# Patient Record
Sex: FEMALE | Race: BLACK OR AFRICAN AMERICAN | NOT HISPANIC OR LATINO | Employment: UNEMPLOYED | URBAN - METROPOLITAN AREA
[De-identification: names, ages, dates, MRNs, and addresses within clinical notes are randomized per-mention and may not be internally consistent; named-entity substitution may affect disease eponyms.]

---

## 2017-01-03 ENCOUNTER — TELEPHONE (OUTPATIENT)
Dept: PODIATRY | Facility: CLINIC | Age: 37
End: 2017-01-03

## 2017-01-03 RX ORDER — CLINDAMYCIN HYDROCHLORIDE 300 MG/1
300 CAPSULE ORAL 3 TIMES DAILY
Qty: 30 CAPSULE | Refills: 0 | Status: SHIPPED | OUTPATIENT
Start: 2017-01-03 | End: 2017-01-13

## 2017-01-03 NOTE — TELEPHONE ENCOUNTER
Attempted to call, however left voicemail. Patient needs to stop taking Bactrim DS and begin taking clindamycin. Her wound culture grew resistant Staph aureus.

## 2017-01-12 ENCOUNTER — OFFICE VISIT (OUTPATIENT)
Dept: PODIATRY | Facility: CLINIC | Age: 37
End: 2017-01-12
Payer: MEDICAID

## 2017-01-12 VITALS
DIASTOLIC BLOOD PRESSURE: 69 MMHG | HEART RATE: 88 BPM | BODY MASS INDEX: 28.89 KG/M2 | HEIGHT: 61 IN | WEIGHT: 153 LBS | SYSTOLIC BLOOD PRESSURE: 110 MMHG

## 2017-01-12 DIAGNOSIS — T81.40XD POSTOPERATIVE INFECTION, SUBSEQUENT ENCOUNTER: Primary | ICD-10-CM

## 2017-01-12 PROCEDURE — 99999 PR PBB SHADOW E&M-EST. PATIENT-LVL III: CPT | Mod: PBBFAC,,, | Performed by: PODIATRIST

## 2017-01-12 PROCEDURE — 99024 POSTOP FOLLOW-UP VISIT: CPT | Mod: ,,, | Performed by: PODIATRIST

## 2017-01-12 PROCEDURE — 99213 OFFICE O/P EST LOW 20 MIN: CPT | Mod: PBBFAC,PO | Performed by: PODIATRIST

## 2017-01-12 NOTE — MR AVS SNAPSHOT
[facility]   [place]  [place] [address]  Phone: [phone]                  Umer Rodriguez   2017 10:00 AM   Office Visit    Description:  Female : 1980   Provider:  Dirk Cummings DPM   Department:  [facility]           Diagnoses this Visit        Comments    Postoperative infection, subsequent encounter    -  Primary            To Do List           Future Appointments        Provider Department Dept Phone    2017 8:00 AM RENUKA XR1 300 LB LIMIT [facility] [phone]    2017 1:30 PM Dirk Cummings DPM [facility] [phone]      Goals (5 Years of Data)     None      Follow-Up and Disposition     Return in about 2 weeks (around 2017).      [facility]     [facility] - / Assistance  Registered nurses in the [facility] Center provide clinical advisement, health education, appointment booking, and other advisory services.  Call for this free service at [phone].             Medications           Message regarding Medications     Verify the changes and/or additions to your medication regime listed below are the same as discussed with your clinician today.  If any of these changes or additions are incorrect, please notify your healthcare provider.        STOP taking these medications     oxycodone-acetaminophen (PERCOCET) 5-325 mg per tablet Take 2 tablets by mouth every 4 (four) hours as needed for Pain.           Verify that the below list of medications is an accurate representation of the medications you are currently taking.  If none reported, the list may be blank. If incorrect, please contact your healthcare provider. Carry this list with you in case of emergency.           Current Medications     clindamycin (CLEOCIN) 300 MG capsule Take 1 capsule (300 mg total) by mouth 3 (three) times daily.    ibuprofen (ADVIL,MOTRIN) 800 MG tablet Take 1 tablet (800 mg total) by mouth  "3 (three) times daily.    L norgest&E estradiol-E estrad (SEASONIQUE) 0.15 mg-30 mcg (84)/10 mcg (7) 3MPk Take 1 tablet by mouth once daily.           Clinical Reference Information           Vital Signs - Last Recorded  Most recent update: 1/12/2017 10:36 AM by Annalee Crane MA    BP Pulse Ht Wt LMP BMI    110/69 88 5' 1" (1.549 m) 69.4 kg (153 lb) 10/29/2016 28.91 kg/m2      Blood Pressure          Most Recent Value    BP  110/69      Allergies as of 1/12/2017     Bactrim [Sulfamethoxazole-trimethoprim]    Percocet [Oxycodone-acetaminophen]      Immunizations Administered on Date of Encounter - 1/12/2017     None      Orders Placed During Today's Visit     Future Labs/Procedures Expected by Expires    X-Ray Foot Complete Right  1/26/2017 1/12/2018      Instructions    Sundar splint right third and fourth toes.       "

## 2017-01-14 NOTE — PROGRESS NOTES
"Subjective:      Patient ID: Umer Rodriguez is a 36 y.o. female.    Chief Complaint: No chief complaint on file.    Post revision arthrodesis with autograft from the calcaneus of fourth toe and fifth toe arthroplasty on 16.. Says she developed an allergic reaction to Bactrim DS. Painting betadine to incision sites as recommended. Notes toes are stilling looking more normal with resolution of swelling in toes.    Vitals:    17 1035   BP: 110/69   Pulse: 88   Weight: 69.4 kg (153 lb)   Height: 5' 1" (1.549 m)   PainSc:   4   PainLoc: Foot      Past Medical History   Diagnosis Date    Allergy     Fibroids     ARNOL (iron deficiency anemia) 2014       Past Surgical History   Procedure Laterality Date     section  ,     Foot surgery Right        Family History   Problem Relation Age of Onset    Hypertension Mother     Breast cancer Neg Hx     Colon cancer Neg Hx     Ovarian cancer Neg Hx     Hyperlipidemia Neg Hx     Heart disease Neg Hx     Diabetes Neg Hx     Cancer Neg Hx     Arthritis Neg Hx        Social History     Social History    Marital status: Single     Spouse name: N/A    Number of children: N/A    Years of education: N/A     Social History Main Topics    Smoking status: Never Smoker    Smokeless tobacco: Never Used    Alcohol use No    Drug use: No    Sexual activity: Yes     Partners: Male     Birth control/ protection: None     Other Topics Concern    None     Social History Narrative       Current Outpatient Prescriptions   Medication Sig Dispense Refill    clindamycin (CLEOCIN) 300 MG capsule Take 1 capsule (300 mg total) by mouth 3 (three) times daily. 30 capsule 0    ibuprofen (ADVIL,MOTRIN) 800 MG tablet Take 1 tablet (800 mg total) by mouth 3 (three) times daily. 30 tablet 0    L norgest&E estradiol-E estrad (SEASONIQUE) 0.15 mg-30 mcg (84)/10 mcg (7) 3MPk Take 1 tablet by mouth once daily. 90 each 6     No current facility-administered " medications for this visit.        Review of patient's allergies indicates:  No Known Allergies      Review of Systems   Constitution: Negative for chills, fever, weakness and malaise/fatigue.   Cardiovascular: Negative for chest pain, claudication and leg swelling.   Respiratory: Negative for cough and shortness of breath.    Skin: Positive for rash. Negative for itching.   Musculoskeletal: Negative for back pain, joint pain, muscle cramps and muscle weakness.   Gastrointestinal: Negative for nausea and vomiting.   Neurological: Negative for numbness and paresthesias.   Psychiatric/Behavioral: Negative for altered mental status.           Objective:      Physical Exam   Constitutional: She is oriented to person, place, and time. She appears well-developed and well-nourished. No distress.   Cardiovascular: Intact distal pulses.    Pulses:       Dorsalis pedis pulses are 2+ on the right side, and 2+ on the left side.        Posterior tibial pulses are 2+ on the right side, and 2+ on the left side.   Musculoskeletal:        Feet:    Rectus alignment toes 1-4 right foot. No available motion hallux IPJ and toes 2,3 secondary to arthrodesis. Right fourth toe is iatrogenically shortened and flexible.     Left foot medial longitudinal arch is moderately depressed with foot loading compared to left. Mild gastrocnemius equinus bilateral foot. Flexible pes planus bilateral foot.    Pain plantar right heel. No pain with medial and lateral compression of heel. No pain with MMT.    Neurological: She is alert and oriented to person, place, and time. She has normal strength. No sensory deficit.   Skin: Skin is warm and dry. No ecchymosis and no rash noted. She is not diaphoretic. No cyanosis or erythema. No pallor. Nails show no clubbing.   Superficial dehiscence of right hallux incision site with granular base and mild surrounding skin slough/hyperkeratosis, no erythema, scant serous drainage, no odor.    Crusting overlying  incision site fourth and fifth toes. small pin whole sized wound dorsal lateral right fifth toe wound site, no drainage, no erythema, no odor.    Incision lateral right heel healed.               Assessment:       Encounter Diagnosis   Name Primary?    Postoperative infection, subsequent encounter Yes         Plan:       Diagnoses and all orders for this visit:    Postoperative infection, subsequent encounter  -     X-Ray Foot Complete Right; Future      I counseled the patient on her conditions, their implications and medical management.    Continue betadine to hallux and fifth toe incision sites.    Continue protected weightbearing with boot. Discussed placing weight towards midfoot and altering gait.     RTC 2 weeks or prn.

## 2017-01-18 ENCOUNTER — PATIENT MESSAGE (OUTPATIENT)
Dept: PODIATRY | Facility: CLINIC | Age: 37
End: 2017-01-18

## 2017-01-26 ENCOUNTER — OFFICE VISIT (OUTPATIENT)
Dept: PODIATRY | Facility: CLINIC | Age: 37
End: 2017-01-26
Payer: MEDICAID

## 2017-01-26 ENCOUNTER — HOSPITAL ENCOUNTER (OUTPATIENT)
Dept: RADIOLOGY | Facility: HOSPITAL | Age: 37
Discharge: HOME | End: 2017-01-26
Attending: PODIATRIST
Payer: MEDICAID

## 2017-01-26 VITALS
SYSTOLIC BLOOD PRESSURE: 103 MMHG | HEIGHT: 61 IN | DIASTOLIC BLOOD PRESSURE: 68 MMHG | HEART RATE: 88 BPM | WEIGHT: 153 LBS | BODY MASS INDEX: 28.89 KG/M2

## 2017-01-26 DIAGNOSIS — M84.374A STRESS FRACTURE OF CALCANEUS, RIGHT, INITIAL ENCOUNTER: Primary | ICD-10-CM

## 2017-01-26 DIAGNOSIS — Z98.890 POSTOPERATIVE STATE: ICD-10-CM

## 2017-01-26 DIAGNOSIS — T81.40XD POSTOPERATIVE INFECTION, SUBSEQUENT ENCOUNTER: ICD-10-CM

## 2017-01-26 PROCEDURE — 99999 PR PBB SHADOW E&M-EST. PATIENT-LVL III: CPT | Mod: PBBFAC,,, | Performed by: PODIATRIST

## 2017-01-26 PROCEDURE — 99024 POSTOP FOLLOW-UP VISIT: CPT | Mod: ,,, | Performed by: PODIATRIST

## 2017-01-26 PROCEDURE — 99213 OFFICE O/P EST LOW 20 MIN: CPT | Mod: PBBFAC,PO | Performed by: PODIATRIST

## 2017-01-26 PROCEDURE — 73630 X-RAY EXAM OF FOOT: CPT | Mod: 26,RT,, | Performed by: RADIOLOGY

## 2017-01-26 NOTE — PROGRESS NOTES
"Subjective:      Patient ID: Umer Rodriguez is a 36 y.o. female.    Chief Complaint: Post-op Evaluation    Post revision arthrodesis with autograft from the calcaneus of fourth toe and fifth toe arthroplasty on 16. Relates a week ago she developed severe pain in right heel. Pain resolved with rest and limited walking. No pain today. Ambulating with orthopedic boot.    Vitals:    17 1345   BP: 103/68   Pulse: 88   Weight: 69.4 kg (153 lb)   Height: 5' 1" (1.549 m)   PainSc: 0-No pain      Past Medical History   Diagnosis Date    Allergy     Fibroids     ARNOL (iron deficiency anemia) 2014       Past Surgical History   Procedure Laterality Date     section  ,     Foot surgery Right        Family History   Problem Relation Age of Onset    Hypertension Mother     Breast cancer Neg Hx     Colon cancer Neg Hx     Ovarian cancer Neg Hx     Hyperlipidemia Neg Hx     Heart disease Neg Hx     Diabetes Neg Hx     Cancer Neg Hx     Arthritis Neg Hx        Social History     Social History    Marital status: Single     Spouse name: N/A    Number of children: N/A    Years of education: N/A     Social History Main Topics    Smoking status: Never Smoker    Smokeless tobacco: Never Used    Alcohol use No    Drug use: No    Sexual activity: Yes     Partners: Male     Birth control/ protection: None     Other Topics Concern    None     Social History Narrative       Current Outpatient Prescriptions   Medication Sig Dispense Refill    ibuprofen (ADVIL,MOTRIN) 800 MG tablet Take 1 tablet (800 mg total) by mouth 3 (three) times daily. 30 tablet 0    L norgest&E estradiol-E estrad (SEASONIQUE) 0.15 mg-30 mcg (84)/10 mcg (7) 3MPk Take 1 tablet by mouth once daily. 90 each 6     No current facility-administered medications for this visit.        Review of patient's allergies indicates:  No Known Allergies      Review of Systems   Constitution: Negative for chills, fever, weakness and " malaise/fatigue.   Cardiovascular: Negative for chest pain, claudication and leg swelling.   Respiratory: Negative for cough and shortness of breath.    Skin: Negative for itching and rash.   Musculoskeletal: Negative for back pain, joint pain, muscle cramps and muscle weakness.   Gastrointestinal: Negative for nausea and vomiting.   Neurological: Negative for numbness and paresthesias.   Psychiatric/Behavioral: Negative for altered mental status.           Objective:      Physical Exam   Constitutional: She is oriented to person, place, and time. She appears well-developed and well-nourished. No distress.   Cardiovascular: Intact distal pulses.    Pulses:       Dorsalis pedis pulses are 2+ on the right side, and 2+ on the left side.        Posterior tibial pulses are 2+ on the right side, and 2+ on the left side.   Musculoskeletal:        Feet:    Rectus alignment toes 1-4 right foot. No available motion hallux IPJ and toes 2,3 secondary to arthrodesis. Right fourth toe is iatrogenically shortened and flexible.     Left foot medial longitudinal arch is moderately depressed with foot loading compared to left. Mild gastrocnemius equinus bilateral foot. Flexible pes planus bilateral foot.    Pain plantar right heel. No pain with medial and lateral compression of heel. No pain with MMT.    Neurological: She is alert and oriented to person, place, and time. She has normal strength. No sensory deficit.   Skin: Skin is warm, dry and intact. No ecchymosis and no rash noted. She is not diaphoretic. No cyanosis or erythema. No pallor. Nails show no clubbing.   Hyperpigmentation and crusting of skin right fourth and fifth toes. Normal appearing scars except for hypertrophy lateral aspect of fifth toe scar.    No open lesions or macerations bilateral lower extremity.                     Assessment:       Encounter Diagnoses   Name Primary?    Stress fracture of calcaneus, right, initial encounter Yes    Postoperative state           Plan:       Umer was seen today for post-op evaluation.    Diagnoses and all orders for this visit:    Stress fracture of calcaneus, right, initial encounter    Postoperative state      I counseled the patient on her conditions, their implications and medical management.    Reviewed right foot xray noting small crack along plantar calcaneus body. Previous donor sites healed. Gapping in fourth and fifth toes noted, no significant change in alignment.    Rx compound scar cream.    May begin gradual 30 minute daily increments to transition to a tennis shoe.    May participate in low impact exercise such as stationary bike, elliptical and swimming.     Avoid high impact activities such as running, jumping and squatting.    RTC 2 months or prn.

## 2017-01-26 NOTE — MR AVS SNAPSHOT
[facility]   [place] [address]  Phone: [phone]                  Umer Rodriguez   2017 1:30 PM   Office Visit    Description:  Female : 1980   Provider:  Dirk Cummings DPM   Department:  [facility]           Reason for Visit     Post-op Evaluation           Diagnoses this Visit        Comments    Stress fracture of calcaneus, right, initial encounter    -  Primary     Postoperative state                To Do List           Future Appointments        Provider Department Dept Phone    3/27/2017 10:00 AM [phone]      Goals (5 Years of Data)     None      Follow-Up and Disposition     Return in about 2 months (around 3/26/2017).      [facility]     [facility] On Call Nurse [facility] Line -  Assistance  Registered nurses in the [facility] On Call Center provide clinical advisement, health education, appointment booking, and other advisory services.  Call for this free service at [phone].             Medications           Message regarding Medications     Verify the changes and/or additions to your medication regime listed below are the same as discussed with your clinician today.  If any of these changes or additions are incorrect, please notify your healthcare provider.             Verify that the below list of medications is an accurate representation of the medications you are currently taking.  If none reported, the list may be blank. If incorrect, please contact your healthcare provider. Carry this list with you in case of emergency.           Current Medications     ibuprofen (ADVIL,MOTRIN) 800 MG tablet Take 1 tablet (800 mg total) by mouth 3 (three) times daily.    L norgest&E estradiol-E estrad (SEASONIQUE) 0.15 mg-30 mcg (84)/10 mcg (7) 3MPk Take 1 tablet by mouth once daily.           Clinical Reference Information           Vital Signs - Last Recorded  Most recent update: 2017  1:45  "PM by Annalee Crane MA    BP Pulse Ht Wt LMP BMI    103/68 88 5' 1" (1.549 m) 69.4 kg (153 lb) 10/29/2016 28.91 kg/m2      Blood Pressure          Most Recent Value    BP  103/68      Allergies as of 1/26/2017     Bactrim [Sulfamethoxazole-trimethoprim]    Percocet [Oxycodone-acetaminophen]      Immunizations Administered on Date of Encounter - 1/26/2017     None      Instructions    May begin gradual 30 minute daily increments to transition to a tennis shoe.    May participate in low impact exercise such as stationary bike, elliptical and swimming.     Avoid high impact activities such as running, jumping and squatting.             "

## 2017-01-26 NOTE — PATIENT INSTRUCTIONS
May begin gradual 30 minute daily increments to transition to a tennis shoe.    May participate in low impact exercise such as stationary bike, elliptical and swimming.     Avoid high impact activities such as running, jumping and squatting.

## 2017-02-04 ENCOUNTER — PATIENT MESSAGE (OUTPATIENT)
Dept: FAMILY MEDICINE | Facility: CLINIC | Age: 37
End: 2017-02-04

## 2017-02-07 ENCOUNTER — OFFICE VISIT (OUTPATIENT)
Dept: FAMILY MEDICINE | Facility: CLINIC | Age: 37
End: 2017-02-07
Payer: MEDICAID

## 2017-02-07 VITALS
OXYGEN SATURATION: 99 % | BODY MASS INDEX: 30.22 KG/M2 | RESPIRATION RATE: 16 BRPM | SYSTOLIC BLOOD PRESSURE: 120 MMHG | WEIGHT: 160.06 LBS | HEIGHT: 61 IN | TEMPERATURE: 99 F | HEART RATE: 68 BPM | DIASTOLIC BLOOD PRESSURE: 70 MMHG

## 2017-02-07 DIAGNOSIS — R19.7 DIARRHEA OF PRESUMED INFECTIOUS ORIGIN: Primary | ICD-10-CM

## 2017-02-07 PROCEDURE — 99999 PR PBB SHADOW E&M-EST. PATIENT-LVL III: CPT | Mod: PBBFAC,,, | Performed by: PHYSICIAN ASSISTANT

## 2017-02-07 PROCEDURE — 99213 OFFICE O/P EST LOW 20 MIN: CPT | Mod: S$PBB,,, | Performed by: PHYSICIAN ASSISTANT

## 2017-02-07 PROCEDURE — 99213 OFFICE O/P EST LOW 20 MIN: CPT | Mod: PBBFAC,PO | Performed by: PHYSICIAN ASSISTANT

## 2017-02-07 NOTE — PROGRESS NOTES
Subjective:       Patient ID: Umer Rodriguez is a 36 y.o. female.    Chief Complaint: Diarrhea (off and on for past 2 weeks )    Diarrhea    This is a recurrent problem. The current episode started 1 to 4 weeks ago. The problem occurs 2 to 4 times per day. The problem has been unchanged. The stool consistency is described as mucous and watery. The patient states that diarrhea does not awaken her from sleep. Associated symptoms include abdominal pain and increased flatus. Pertinent negatives include no bloating, chills, fever, headaches, myalgias, sweats, URI or vomiting. Risk factors include recent hospitalization and recent antibiotic use (Dec 2016). She has tried nothing for the symptoms. There is no history of bowel resection, irritable bowel syndrome or a recent abdominal surgery.   Symptoms first started in late December 2016 switched to constipation then started again 5 days ago. Patient was hospitalized in December 2016 for ankle surgery. She was on bactrim and clindamycin.     Review of Systems   Constitutional: Negative for chills and fever.   Gastrointestinal: Positive for abdominal pain, diarrhea and flatus. Negative for bloating and vomiting.   Musculoskeletal: Negative for myalgias.   Neurological: Negative for headaches.       Objective:      Physical Exam   Constitutional: She is oriented to person, place, and time. She appears well-developed and well-nourished. No distress.   HENT:   Head: Normocephalic and atraumatic.   Cardiovascular: Regular rhythm.    No murmur heard.  Pulmonary/Chest: Effort normal and breath sounds normal.   Abdominal: Normal appearance. Bowel sounds are increased. There is no hepatosplenomegaly. There is no tenderness. There is no guarding.   Neurological: She is alert and oriented to person, place, and time.   Skin: She is not diaphoretic.   Psychiatric: She has a normal mood and affect. Her behavior is normal.   Vitals reviewed.      Assessment:       1. Diarrhea of  presumed infectious origin        Plan:         Umer was seen today for diarrhea.    Diagnoses and all orders for this visit:    Diarrhea of presumed infectious origin  -     Clostridium difficile EIA; Future  DUE TO PT TAKING CLINDA  -     Stool Exam-Ova,Cysts,Parasites; Future  -     CULTURE, STOOL; Future  -     Increase fluids, probiotics, will await results

## 2017-02-07 NOTE — MR AVS SNAPSHOT
[facility]  [address]  [place] [address]  Phone: [phone]  Fax: [phone]                  Umer Rodriguez   2017 11:20 AM   Office Visit    Description:  Female : 1980   Provider:  Karina Bryson PA-C   Department:  [facility]           Reason for Visit     Diarrhea           Diagnoses this Visit        Comments    Diarrhea of presumed infectious origin    -  Primary            To Do List           Future Appointments        Provider Department Dept Phone    3/27/2017 10:00 AM Dirk Cummings DPM [facility] [phone]      Goals (5 Years of Data)     None      [facility]     [facility] Call Nurse [facility] Line -  Assistance  Registered nurses in the [facility] On Call Center provide clinical advisement, health education, appointment booking, and other advisory services.  Call for this free service at [phone].             Medications           Message regarding Medications     Verify the changes and/or additions to your medication regime listed below are the same as discussed with your clinician today.  If any of these changes or additions are incorrect, please notify your healthcare provider.        STOP taking these medications     ibuprofen (ADVIL,MOTRIN) 800 MG tablet Take 1 tablet (800 mg total) by mouth 3 (three) times daily.           Verify that the below list of medications is an accurate representation of the medications you are currently taking.  If none reported, the list may be blank. If incorrect, please contact your healthcare provider. Carry this list with you in case of emergency.           Current Medications     L norgest&E estradiol-E estrad (SEASONIQUE) 0.15 mg-30 mcg (84)/10 mcg (7) 3MPk Take 1 tablet by mouth once daily.           Clinical Reference Information           Your Vitals Were     BP Pulse Temp Resp Height Weight    120/70 (BP Location: Left arm, Patient Position: Sitting, BP Method:  "Manual) 68 98.9 °F (37.2 °C) (Oral) 16 5' 1" (1.549 m) 72.6 kg (160 lb 0.9 oz)    Last Period SpO2 BMI          01/22/2017 (Exact Date) 99% 30.24 kg/m2        Blood Pressure          Most Recent Value    BP  120/70      Allergies as of 2/7/2017     Bactrim [Sulfamethoxazole-trimethoprim]    Percocet [Oxycodone-acetaminophen]      Immunizations Administered on Date of Encounter - 2/7/2017     None      Orders Placed During Today's Visit     Future Labs/Procedures Expected by Expires    Clostridium difficile EIA  2/7/2017 2/7/2018    CULTURE, STOOL  2/7/2017 2/7/2018    Stool Exam-Ova,Cysts,Parasites  2/7/2017 2/7/2018      Language Assistance Services     ATTENTION: Language assistance services are available, free of charge. Please call [phone].      ATENCIÓN: Si habla español, tiene a oconnor disposición servicios gratuitos de asistencia lingüística. Llame al [phone].     CHÚ Ý: N?u b?n nói Ti?ng Vi?t, có các d?ch v? h? tr? ngôn ng? mi?n phí dành cho b?n. G?i s? [phone].         [facility] complies with applicable Federal civil rights laws and does not discriminate on the basis of race, color, national origin, age, disability, or sex.        "

## 2017-02-08 ENCOUNTER — LAB VISIT (OUTPATIENT)
Dept: LAB | Facility: HOSPITAL | Age: 37
End: 2017-02-08
Attending: FAMILY MEDICINE
Payer: MEDICAID

## 2017-02-08 DIAGNOSIS — R19.7 DIARRHEA OF PRESUMED INFECTIOUS ORIGIN: ICD-10-CM

## 2017-02-08 PROCEDURE — 87449 NOS EACH ORGANISM AG IA: CPT

## 2017-02-08 PROCEDURE — 87046 STOOL CULTR AEROBIC BACT EA: CPT | Mod: 59

## 2017-02-08 PROCEDURE — 87209 SMEAR COMPLEX STAIN: CPT

## 2017-02-08 PROCEDURE — 87045 FECES CULTURE AEROBIC BACT: CPT

## 2017-02-08 PROCEDURE — 87427 SHIGA-LIKE TOXIN AG IA: CPT | Mod: 59

## 2017-02-09 ENCOUNTER — TELEPHONE (OUTPATIENT)
Dept: FAMILY MEDICINE | Facility: CLINIC | Age: 37
End: 2017-02-09

## 2017-02-09 DIAGNOSIS — A04.72 C. DIFFICILE DIARRHEA: Primary | ICD-10-CM

## 2017-02-09 LAB
C DIFF GDH STL QL: POSITIVE
C DIFF TOX A+B STL QL IA: POSITIVE
E COLI SXT1 STL QL IA: NEGATIVE
E COLI SXT2 STL QL IA: NEGATIVE
O+P STL TRI STN: NORMAL

## 2017-02-09 RX ORDER — METRONIDAZOLE 500 MG/1
500 TABLET ORAL 3 TIMES DAILY
Qty: 30 TABLET | Refills: 0 | Status: SHIPPED | OUTPATIENT
Start: 2017-02-09 | End: 2017-02-19

## 2017-02-09 NOTE — TELEPHONE ENCOUNTER
Please inform pt she is positive for C. Diff likely caused by clindamycin antibiotic she took after surgery. Flagyl 500mg 3xs daily sent to pharmacy for 10 days. She should wash her hands thoroughly after using the bathroom with soap and warm water for at least 15 seconds as this is very contagious. Clean toilet well after bowel movements. Monitor symptoms in her family and have them see a doctor if they get diarrhea. If diarrhea resolves, no further workup is needed. Call if no resolution in diarrhea.

## 2017-02-11 LAB — BACTERIA STL CULT: NORMAL

## 2017-02-20 ENCOUNTER — PATIENT MESSAGE (OUTPATIENT)
Dept: FAMILY MEDICINE | Facility: CLINIC | Age: 37
End: 2017-02-20

## 2017-03-14 ENCOUNTER — PATIENT MESSAGE (OUTPATIENT)
Dept: PODIATRY | Facility: CLINIC | Age: 37
End: 2017-03-14

## 2017-03-27 ENCOUNTER — OFFICE VISIT (OUTPATIENT)
Dept: PODIATRY | Facility: CLINIC | Age: 37
End: 2017-03-27
Payer: MEDICAID

## 2017-03-27 VITALS
SYSTOLIC BLOOD PRESSURE: 118 MMHG | BODY MASS INDEX: 30.21 KG/M2 | DIASTOLIC BLOOD PRESSURE: 78 MMHG | HEIGHT: 61 IN | WEIGHT: 160 LBS | HEART RATE: 75 BPM

## 2017-03-27 DIAGNOSIS — Z98.890 HISTORY OF FOOT SURGERY: ICD-10-CM

## 2017-03-27 DIAGNOSIS — Z01.818 PREOP EXAMINATION: ICD-10-CM

## 2017-03-27 DIAGNOSIS — L91.0 HYPERTROPHIC SCAR: Primary | ICD-10-CM

## 2017-03-27 PROCEDURE — 99999 PR PBB SHADOW E&M-EST. PATIENT-LVL III: CPT | Mod: PBBFAC,,, | Performed by: PODIATRIST

## 2017-03-27 PROCEDURE — 99214 OFFICE O/P EST MOD 30 MIN: CPT | Mod: S$PBB,,, | Performed by: PODIATRIST

## 2017-03-27 PROCEDURE — 99213 OFFICE O/P EST LOW 20 MIN: CPT | Mod: PBBFAC,PO | Performed by: PODIATRIST

## 2017-03-27 RX ORDER — LIDOCAINE HYDROCHLORIDE 10 MG/ML
1 INJECTION, SOLUTION EPIDURAL; INFILTRATION; INTRACAUDAL; PERINEURAL ONCE
Status: CANCELLED | OUTPATIENT
Start: 2017-03-27 | End: 2017-03-27

## 2017-03-27 NOTE — MR AVS SNAPSHOT
[facility]   [facility] [address]  Phone: [phone]                  Umer Rodriguez   3/27/2017 10:00 AM   Office Visit    Description:  Female : 1980   Provider:  Dirk Cummings DPM   Department:  [facility]           Diagnoses this Visit        Comments    Hypertrophic scar    -  Primary     History of foot surgery         Preop examination                To Do List           Future Appointments        Provider Department Dept Phone    3/27/2017 11:10 AM [facility] [phone]    4/10/2017 11:20 AM Mally Loera MD [facility] [phone]    2017 10:00 AM Dirk Cummings DPM [facility] [phone]      Goals (5 Years of Data)     None      Follow-Up and Disposition     Return if symptoms worsen or fail to improve, for 1 week postop.      [facility]     [facility] -  Assistance  Registered nurses in the [facility] provide clinical advisement, health education, appointment booking, and other advisory services.  Call for this free service at [phone].             Medications           Message regarding Medications     Verify the changes and/or additions to your medication regime listed below are the same as discussed with your clinician today.  If any of these changes or additions are incorrect, please notify your healthcare provider.             Verify that the below list of medications is an accurate representation of the medications you are currently taking.  If none reported, the list may be blank. If incorrect, please contact your healthcare provider. Carry this list with you in case of emergency.           Current Medications     L norgest&E estradiol-E estrad (SEASONIQUE) 0.15 mg-30 mcg (84)/10 mcg (7) 3MPk Take 1 tablet by mouth once daily.           Clinical Reference Information           Your Vitals Were     BP Pulse Height  "Weight BMI    118/78 75 5' 1" (1.549 m) 72.6 kg (160 lb) 30.23 kg/m2      Blood Pressure          Most Recent Value    BP  118/78      Allergies as of 3/27/2017     Bactrim [Sulfamethoxazole-trimethoprim]    Percocet [Oxycodone-acetaminophen]      Immunizations Administered on Date of Encounter - 3/27/2017     None      Orders Placed During Today's Visit      Normal Orders This Visit    Ambulatory Referral to Family Practice     Case Request Operating Room: DEBULKING TOE     Future Labs/Procedures Expected by Expires    Basic metabolic panel  3/27/2017 3/27/2018    CBC auto differential  3/27/2017 5/26/2018      Language Assistance Services     ATTENTION: Language assistance services are available, free of charge. Please call [phone].      ATENCIÓN: Si trudy guero, tiene a oconnor disposición servicios gratuitos de asistencia lingüística. Llame al [phone].     JESUS Ý: N?u b?n nói Ti?ng Vi?t, có các d?ch v? h? tr? ngôn ng? mi?n phí dành cho b?n. G?i s? [phone].         [facility] complies with applicable Federal civil rights laws and does not discriminate on the basis of race, color, national origin, age, disability, or sex.        "

## 2017-03-28 NOTE — PROGRESS NOTES
"Subjective:      Patient ID: Umer Rodriguez is a 36 y.o. female.    Chief Complaint: No chief complaint on file.    Post revision arthrodesis with autograft from the calcaneus of fourth toe and fifth toe arthroplasty on 16. Relates a week ago she developed severe pain in right heel. Pain resolved with rest and limited walking. No pain today. Ambulating with orthopedic boot.    3/27/17: Returns complaining of localized pain to the top of the right fifth toe pointing to an area of thickened skin. Says it rubs when in enclosed shoes and causes her discomfort. Applying silicone scar cream, however no improvement.    Vitals:    17 1011   BP: 118/78   Pulse: 75   Weight: 72.6 kg (160 lb)   Height: 5' 1" (1.549 m)   PainSc:   4   PainLoc: Toe      Past Medical History:   Diagnosis Date    Allergy     Fibroids     ARNOL (iron deficiency anemia) 2014       Past Surgical History:   Procedure Laterality Date     SECTION  ,     FOOT SURGERY Right        Family History   Problem Relation Age of Onset    Hypertension Mother     Breast cancer Neg Hx     Colon cancer Neg Hx     Ovarian cancer Neg Hx     Hyperlipidemia Neg Hx     Heart disease Neg Hx     Diabetes Neg Hx     Cancer Neg Hx     Arthritis Neg Hx        Social History     Social History    Marital status: Single     Spouse name: N/A    Number of children: N/A    Years of education: N/A     Social History Main Topics    Smoking status: Never Smoker    Smokeless tobacco: Never Used    Alcohol use No    Drug use: No    Sexual activity: Yes     Partners: Male     Birth control/ protection: None     Other Topics Concern    None     Social History Narrative       Current Outpatient Prescriptions   Medication Sig Dispense Refill    L norgest&E estradiol-E estrad (SEASONIQUE) 0.15 mg-30 mcg (84)/10 mcg (7) 3MPk Take 1 tablet by mouth once daily. 90 each 6     No current facility-administered medications for this " visit.        Review of patient's allergies indicates:  No Known Allergies      Review of Systems   Constitution: Negative for chills, fever, weakness and malaise/fatigue.   Cardiovascular: Negative for chest pain, claudication and leg swelling.   Respiratory: Negative for cough and shortness of breath.    Skin: Negative for itching and rash.   Musculoskeletal: Negative for back pain, joint pain, muscle cramps and muscle weakness.   Gastrointestinal: Negative for nausea and vomiting.   Neurological: Negative for numbness and paresthesias.   Psychiatric/Behavioral: Negative for altered mental status.           Objective:      Physical Exam   Constitutional: She is oriented to person, place, and time. She appears well-developed and well-nourished. No distress.   Cardiovascular: Intact distal pulses.    Pulses:       Dorsalis pedis pulses are 2+ on the right side, and 2+ on the left side.        Posterior tibial pulses are 2+ on the right side, and 2+ on the left side.   Musculoskeletal:        Feet:    Rectus alignment toes 1-4 right foot. No available motion hallux IPJ and toes 2,3 secondary to arthrodesis. Right fourth toe is iatrogenically shortened and flexible.     Left foot medial longitudinal arch is moderately depressed with foot loading compared to left. Mild gastrocnemius equinus bilateral foot. Flexible pes planus bilateral foot.    Pain plantar right heel. No pain with medial and lateral compression of heel. No pain with MMT.    Neurological: She is alert and oriented to person, place, and time. She has normal strength. No sensory deficit.   Skin: Skin is warm, dry and intact. No ecchymosis and no rash noted. She is not diaphoretic. No cyanosis or erythema. No pallor. Nails show no clubbing.   Right fifth toe with thickened scar and bulging of dorsal medial skin at previous incision site with localized pain on palpation.     No open lesions or macerations bilateral lower extremity.                      Assessment:       Encounter Diagnoses   Name Primary?    Hypertrophic scar Yes    History of foot surgery     Preop examination          Plan:       Diagnoses and all orders for this visit:    Hypertrophic scar  -     Case Request Operating Room: DEBULKING TOE  -     Place in Outpatient; Standing    History of foot surgery  -     Case Request Operating Room: DEBULKING TOE  -     Place in Outpatient; Standing    Preop examination  -     Ambulatory Referral to Family Practice  -     CBC auto differential; Future  -     Basic metabolic panel; Future    Other orders  -     lidocaine (PF) 10 mg/ml (1%) injection 10 mg; Inject 1 mL (10 mg total) into the skin once.  -     ceFAZolin (ANCEF) 2 g in dextrose 5 % 100 mL IVPB; Inject 2 g into the vein On call Procedure (Surgery).      I counseled the patient on her conditions, their implications and medical management.    Discussed continued care such as scar mobilization with PT, continued use of scar cream with massage vs surgical excision of the excess and thickened skin in detail. Associated risks, benefits and postop course discussed. No guarantees given or implied.    This procedure has been fully reviewed with the patient and written informed consent has been obtained.    [facility]17.    RTC 1 week postop.

## 2017-04-10 ENCOUNTER — OFFICE VISIT (OUTPATIENT)
Dept: FAMILY MEDICINE | Facility: CLINIC | Age: 37
End: 2017-04-10
Payer: MEDICAID

## 2017-04-10 ENCOUNTER — LAB VISIT (OUTPATIENT)
Dept: LAB | Facility: HOSPITAL | Age: 37
End: 2017-04-10
Attending: PODIATRIST
Payer: MEDICAID

## 2017-04-10 VITALS
HEIGHT: 61 IN | SYSTOLIC BLOOD PRESSURE: 100 MMHG | HEART RATE: 67 BPM | RESPIRATION RATE: 16 BRPM | OXYGEN SATURATION: 98 % | WEIGHT: 161.81 LBS | DIASTOLIC BLOOD PRESSURE: 60 MMHG | BODY MASS INDEX: 30.55 KG/M2 | TEMPERATURE: 98 F

## 2017-04-10 DIAGNOSIS — N63.0 BREAST MASS IN FEMALE: ICD-10-CM

## 2017-04-10 DIAGNOSIS — L91.0 HYPERTROPHIC SCAR: Primary | ICD-10-CM

## 2017-04-10 DIAGNOSIS — Z01.818 PREOP EXAMINATION: ICD-10-CM

## 2017-04-10 DIAGNOSIS — J30.1 SEASONAL ALLERGIC RHINITIS DUE TO POLLEN: ICD-10-CM

## 2017-04-10 DIAGNOSIS — Z98.890 HISTORY OF FOOT SURGERY: ICD-10-CM

## 2017-04-10 DIAGNOSIS — L30.9 ECZEMA, UNSPECIFIED TYPE: ICD-10-CM

## 2017-04-10 LAB
ANION GAP SERPL CALC-SCNC: 7 MMOL/L
BASOPHILS # BLD AUTO: 0.01 K/UL
BASOPHILS NFR BLD: 0.2 %
BUN SERPL-MCNC: 15 MG/DL
CALCIUM SERPL-MCNC: 9.1 MG/DL
CHLORIDE SERPL-SCNC: 107 MMOL/L
CO2 SERPL-SCNC: 26 MMOL/L
CREAT SERPL-MCNC: 0.9 MG/DL
DIFFERENTIAL METHOD: NORMAL
EOSINOPHIL # BLD AUTO: 0.1 K/UL
EOSINOPHIL NFR BLD: 1.5 %
ERYTHROCYTE [DISTWIDTH] IN BLOOD BY AUTOMATED COUNT: 13.2 %
EST. GFR  (AFRICAN AMERICAN): >60 ML/MIN/1.73 M^2
EST. GFR  (NON AFRICAN AMERICAN): >60 ML/MIN/1.73 M^2
GLUCOSE SERPL-MCNC: 81 MG/DL
HCT VFR BLD AUTO: 42.9 %
HGB BLD-MCNC: 14.3 G/DL
LYMPHOCYTES # BLD AUTO: 2.3 K/UL
LYMPHOCYTES NFR BLD: 42.6 %
MCH RBC QN AUTO: 30.4 PG
MCHC RBC AUTO-ENTMCNC: 33.3 %
MCV RBC AUTO: 91 FL
MONOCYTES # BLD AUTO: 0.4 K/UL
MONOCYTES NFR BLD: 7.7 %
NEUTROPHILS # BLD AUTO: 2.5 K/UL
NEUTROPHILS NFR BLD: 47.8 %
PLATELET # BLD AUTO: 208 K/UL
PMV BLD AUTO: 11.8 FL
POTASSIUM SERPL-SCNC: 4.5 MMOL/L
RBC # BLD AUTO: 4.71 M/UL
SODIUM SERPL-SCNC: 140 MMOL/L
WBC # BLD AUTO: 5.31 K/UL

## 2017-04-10 PROCEDURE — 80048 BASIC METABOLIC PNL TOTAL CA: CPT

## 2017-04-10 PROCEDURE — 36415 COLL VENOUS BLD VENIPUNCTURE: CPT | Mod: PO

## 2017-04-10 PROCEDURE — 99999 PR PBB SHADOW E&M-EST. PATIENT-LVL III: CPT | Mod: PBBFAC,,, | Performed by: FAMILY MEDICINE

## 2017-04-10 PROCEDURE — 85025 COMPLETE CBC W/AUTO DIFF WBC: CPT

## 2017-04-10 PROCEDURE — 99214 OFFICE O/P EST MOD 30 MIN: CPT | Mod: S$PBB,,, | Performed by: FAMILY MEDICINE

## 2017-04-10 RX ORDER — TRIAMCINOLONE ACETONIDE 1 MG/G
CREAM TOPICAL 3 TIMES DAILY
Qty: 45 TUBE | Refills: 2 | Status: SHIPPED | OUTPATIENT
Start: 2017-04-10 | End: 2019-02-07 | Stop reason: CLARIF

## 2017-04-10 RX ORDER — FLUTICASONE PROPIONATE 50 MCG
1 SPRAY, SUSPENSION (ML) NASAL DAILY
Qty: 1 BOTTLE | Refills: 11 | Status: SHIPPED | OUTPATIENT
Start: 2017-04-10 | End: 2017-10-16

## 2017-04-10 NOTE — LETTER
April 11, 2017      Dirk Cummings, DPM  [address] [address]           [facility] [facility] [address] [place] [address]  Phone: [phone]  Fax: [phone]          Patient: Umer Rodriguez   MR Number: [id number]   YOB: 1980   Date of Visit: 4/10/2017       Dear Dr. Dirk Cummings:    Thank you for referring Umer Rodriguez to me for evaluation. Attached you will find relevant portions of my assessment and plan of care.    If you have questions, please do not hesitate to call me. I look forward to following Umer Rodriguez along with you.    Sincerely,    Mally Loera MD    Enclosure  CC:  No Recipients    If you would like to receive this communication electronically, please contact [email or web address] or [phone] to request more information on [facility] access.    For providers and/or their staff who would like to refer a patient to [facility], please contact us through our one-stop-shop provider referral line, [facility], at [phone].    If you feel you have received this communication in error or would no longer like to receive these types of communications, please e-mail [email or web address]

## 2017-04-11 ENCOUNTER — ANESTHESIA EVENT (OUTPATIENT)
Dept: SURGERY | Facility: HOSPITAL | Age: 37
End: 2017-04-11
Payer: MEDICAID

## 2017-04-11 NOTE — ANESTHESIA PREPROCEDURE EVALUATION
2017  Umer Rdoriguez is a 36 y.o., female scheduled for right toe debulking on 17.    Past Medical History:   Diagnosis Date    Allergy     Fibroids     ARNOL (iron deficiency anemia) 2014     Past Surgical History:   Procedure Laterality Date     SECTION  ,     FOOT SURGERY Right      Review of patient's allergies indicates:   Allergen Reactions    Bactrim [sulfamethoxazole-trimethoprim] Rash     Severe itching and rash    Percocet [oxycodone-acetaminophen] Rash     Prev Anes:  Previous foot surgeries under local/MAC. Records on file. Denies any problems with prev anesthesia.     [facility] Anesthesia Evaluation    I have reviewed the Patient Summary Reports.    I have reviewed the Nursing Notes.   I have reviewed the Medications.     Review of Systems  Anesthesia Hx:  No problems with previous Anesthesia    Social:  Non-Smoker, Social Alcohol Use    Hematology/Oncology:     Oncology Normal    -- Anemia:   EENT/Dental:EENT/Dental Normal   Cardiovascular:   Exercise tolerance: good Denies Hypertension.  Denies MI.   no hyperlipidemia    Pulmonary:  Pulmonary Normal    Renal/:  Renal/ Normal     Hepatic/GI:  Hepatic/GI Normal    Musculoskeletal:  Musculoskeletal Normal    Neurological:  Neurology Normal    Endocrine:  Endocrine Normal    Psych:  Psychiatric Normal           Physical Exam  General:  Well nourished    Airway/Jaw/Neck:  Airway Findings: Mouth Opening: Normal Tongue: Normal  General Airway Assessment: Adult  Mallampati: II  Improves to I with phonation.  TM Distance: Normal, at least 6 cm        Eyes/Ears/Nose:  EYES/EARS/NOSE FINDINGS: Normal   Dental:  DENTAL FINDINGS: Normal   Chest/Lungs:  Chest/Lungs Findings: Clear to auscultation, Normal Respiratory Rate     Heart/Vascular:  Heart Findings: Rate: Normal  Rhythm: Regular Rhythm  Sounds: Normal  Heart  murmur: negative    Abdomen:  Abdomen Findings: Normal    Musculoskeletal:  Musculoskeletal Findings: Normal   Skin:  Skin Findings: Normal    Mental Status:  Mental Status Findings: Normal      Lab Results   Component Value Date    WBC 5.31 04/10/2017    HGB 14.3 04/10/2017    HCT 42.9 04/10/2017    MCV 91 04/10/2017     04/10/2017       Chemistry        Component Value Date/Time     04/10/2017 1326    K 4.5 04/10/2017 1326     04/10/2017 1326    CO2 26 04/10/2017 1326    BUN 15 04/10/2017 1326    CREATININE 0.9 04/10/2017 1326    GLU 81 04/10/2017 1326        Component Value Date/Time    CALCIUM 9.1 04/10/2017 1326    ALKPHOS 54 (L) 10/21/2016 1127    AST 16 10/21/2016 1127    ALT 17 10/21/2016 1127    BILITOT 0.6 10/21/2016 1127        EKG 11/14/16  Sinus bradycardia  Otherwise normal ECG  No previous ECGs available      Anesthesia Plan  Type of Anesthesia, risks & benefits discussed:  Anesthesia Type:  MAC, regional, general  Patient's Preference:   Intra-op Monitoring Plan: standard ASA monitors  Intra-op Monitoring Plan Comments:   Post Op Pain Control Plan:   Post Op Pain Control Plan Comments:   Induction:   IV  Beta Blocker:  Patient is not currently on a Beta-Blocker (No further documentation required).       Informed Consent: Patient understands risks and agrees with Anesthesia plan.  Questions answered. Anesthesia consent signed with patient.  ASA Score: 1     Day of Surgery Review of History & Physical: I have interviewed and examined the patient. I have reviewed the patient's H&P dated:  There are no significant changes.      Anesthesia Plan Notes:   UPT ordered for DOS        Ready For Surgery From Anesthesia Perspective.

## 2017-04-12 ENCOUNTER — HOSPITAL ENCOUNTER (OUTPATIENT)
Dept: PREADMISSION TESTING | Facility: HOSPITAL | Age: 37
Discharge: HOME | End: 2017-04-12
Attending: PODIATRIST
Payer: MEDICAID

## 2017-04-12 ENCOUNTER — PATIENT MESSAGE (OUTPATIENT)
Dept: FAMILY MEDICINE | Facility: CLINIC | Age: 37
End: 2017-04-12

## 2017-04-12 DIAGNOSIS — D50.9 IRON DEFICIENCY ANEMIA, UNSPECIFIED IRON DEFICIENCY ANEMIA TYPE: Primary | ICD-10-CM

## 2017-04-12 RX ORDER — SODIUM CHLORIDE, SODIUM LACTATE, POTASSIUM CHLORIDE, CALCIUM CHLORIDE 600; 310; 30; 20 MG/100ML; MG/100ML; MG/100ML; MG/100ML
INJECTION, SOLUTION INTRAVENOUS CONTINUOUS
Status: CANCELLED | OUTPATIENT
Start: 2017-04-12

## 2017-04-12 RX ORDER — LIDOCAINE HYDROCHLORIDE 10 MG/ML
1 INJECTION, SOLUTION EPIDURAL; INFILTRATION; INTRACAUDAL; PERINEURAL ONCE
Status: CANCELLED | OUTPATIENT
Start: 2017-04-12 | End: 2017-04-12

## 2017-04-12 NOTE — IP AVS SNAPSHOT
[facility]  [address]  [place] [address]  Phone: [phone]           Patient Discharge Instructions  Our goal is to set you up for success. This packet includes information on your condition, medications, and your home care. It will help you care for yourself to prevent having to return to the hospital.     Please ask your nurse if you have any questions.      There are many details to remember when preparing for your surgery. Here is what you will need to do, please ask your nurse if there are more specific instructions and if you have any questions:    1. Before procedure Do not smoke or drink alcoholic beverages 24 hours prior to your procedure. Do not eat or drink anything 8 hours before your procedure - this includes gum, mints, and candy.     2. Day of procedure Please remove all jewelry for the procedure. If you wear contact lenses, dentures, hearing aids or glasses, bring a container to put them in during your surgery and give to a family member.  If your doctor has scheduled you for an overnight stay, bring a small overnight bag with any personal items that you need.      3. After procedure  Make arrangements in advance for transportation home by a responsible adult. It is not safe to drive a vehicle during the 24 hours following surgery.     PLEASE NOTE: You may be contacted the day before your surgery to confirm your surgery date and arrival time. The Surgery schedule has many variables which may affect the time of your surgery case. Family members should be available if your surgery time changes.           [facility]  Unless otherwise directed by your provider, please   contact [facility], our nurse care line   that is available for 24/7 assistance.     [phone] (toll-free)     Registered nurses in the [facility]   provide: appointment scheduling, clinical advisement, health education, and other advisory services.                  ** Verify the list  of medication(s) below is accurate and up to date. Carry this with you in case of emergency. If your medications have changed, please notify your healthcare provider.             Medication List      TAKE these medications        Additional Info                      fluticasone 50 mcg/actuation nasal spray   Commonly known as:  FLONASE   Quantity:  1 Bottle   Refills:  11   Dose:  1 spray    Instructions:  1 spray by Each Nare route once daily.     Begin Date    AM    Noon    PM    Bedtime       L norgest/e.estradiol-e.estrad 0.15 mg-30 mcg (84)/10 mcg (7) 3mpk   Commonly known as:  SEASONIQUE   Quantity:  90 each   Refills:  6   Dose:  1 tablet    Instructions:  Take 1 tablet by mouth once daily.     Begin Date    AM    Noon    PM    Bedtime       triamcinolone acetonide 0.1% 0.1 % cream   Commonly known as:  KENALOG   Quantity:  45 Tube   Refills:  2    Instructions:  Apply topically 3 (three) times daily.     Begin Date    AM    Noon    PM    Bedtime                  Please bring to all follow up appointments:    1. A copy of your discharge instructions.  2. All medicines you are currently taking in their original bottles.  3. Identification and insurance card.    Please arrive 15 minutes ahead of scheduled appointment time.    Please call 24 hours in advance if you must reschedule your appointment and/or time.        Your Scheduled Appointments     Apr 20, 2017  9:30 AM CDT   Mammo Diag with [facility] MAMMO[facility]   [facility]    [address] [address]   [phone]            Apr 27, 2017 10:00 AM CDT   Post OP with SAMIR Gould - Podiatry ([facility])    [address]  [place] [address] [phone]            Jun 01, 2017  2:30 PM CDT   New Gynecological with Kimmy Malik MD   [facility] [address] ([facility])    [address]  [facility] [address] [phone]              Your Future  Surgeries/Procedures     Apr 19, 2017   Surgery with Dirk Cummings DPM   [facility] ([facility])    [address]  [place] [address] [phone]                  Discharge Instructions       Your surgery is scheduled for 4/19/17.    Please report to Outpatient Surgery Intake Office on the 2nd FLOOR at 5:30 a.m.          INSTRUCTIONS IMPORTANT!!!  ¨ Do not eat or drink after 12 midnight-including water. OK to brush teeth, no   gum, candy or mints!            ____  Do not wear makeup, including mascara.  ____  No powder, lotions or creams to surgical area.  ____  Please remove all jewelry, including piercings and leave at home.  ____  No money or valuables needed. Please leave at home.  ____  Please bring any documents given by your doctor.  ____  If going home the same day, arrange for a ride home. You will not be able to             drive if Anesthesia was used.  ____  Wear loose fitting clothing. Allow for dressings, bandages.  ____  Stop Aspirin, Ibuprofen, Motrin and Aleve at least 3-5 days before surgery, unless otherwise instructed by your doctor, or the nurse.   You MAY use Tylenol/acetaminophen until day of surgery.  ____  Wash the surgical area with Hibiclens the night before surgery, and again the             morning of surgery.  Be sure to rinse hibiclens off completely (if instructed by   nurse).  ____  If you take diabetic medication, do not take am of surgery unless instructed by Doctor.  ____  Call MD for temperature above 101 degrees.  ____ Stop taking any Fish Oil supplement or any Vitamins that contain Vitamin E at least 5 days prior to surgery.  ____ Do Not wear your contact lenses the day of your procedure.  You may wear your glasses.        I have read or had read and explained to me, and understand the above information.  Additional comments or instructions:  For additional questions call [phone]     Take a Hibiclens shower twice a day for 3  days prior to surgery, including the morning of surgery.   Gargle with Listerine twice a day for 2 days prior to surgery, including the morning of surgery.      Pre-Op Bathing Instructions    Before surgery, you can play an important role in your own health.    Because skin is not sterile, we need to be sure that your skin is as free of germs as possible. By following the instructions below, you can reduce the number of germs on your skin before surgery.    IMPORTANT: You will need to shower with a special soap called Hibiclens*, available at any pharmacy.  If you are allergic to Chlorhexidine (the antiseptic in Hibiclens), use an antibacterial soap such as Dial Soap for your preoperative shower.  You will shower with Hibiclens both the night before your surgery and the morning of your surgery.  Do not use Hibiclens on the head, face or genitals to avoid injury to those areas.    STEP #1: THE NIGHT BEFORE YOUR SURGERY     1. Do not shave the area of your body where your surgery will be performed.  2. Shower and wash your hair and body as usual with your normal soap and shampoo.  3. Rinse your hair and body thoroughly after you shower to remove all soap residue.  4. With your hand, apply one packet of Hibiclens soap to the surgical site.   5. Wash the site gently for five (5) minutes. Do not scrub your skin too hard.   6. Do not wash with your regular soap after Hibiclens is used.  7. Rinse your body thoroughly.  8. Pat yourself dry with a clean, soft towel.  9. Do not use lotion, cream, or powder.  10. Wear clean clothes.    STEP #2: THE MORNING OF YOUR SURGERY     1. Repeat Step #1.    * Not to be used by people allergic to Chlorhexidine.          Anesthesia: Monitored Anesthesia Care (MAC)  Youre due to have surgery. During surgery, youll be given medication called anesthesia. This will keep you comfortable and pain-free. Your surgeon will use monitored anesthesia care (MAC). This sheet tells you more about  this type of anesthesia.    What is monitored anesthesia care?  MAC keeps you very drowsy during surgery. You may be awake, but you will likely not remember much. And you wont feel pain. With MAC, medications are given through an IV line into a vein in your arm or hand. A local anesthetic will usually be injected into the skin and muscle around the surgical site to numb it. The anesthesia provider monitors you during the procedure. He or she checks your heart rate and rhythm, blood pressure, and blood oxygen level.  Anesthesia tools and medications that may be near you during your procedure  You will likely have:  · A pulse oximeter on the end of your finger. This measures your blood oxygen level.  · Electrocardiography leads (electrodes) on your chest. These record your heart rate and rhythm.  · Medications given through an IV. These relax you and prevent pain. You may be awake or sleep lightly. If you have local anesthetic, it is injected directly into your skin.  · A facemask to give you oxygen, if needed.  Risks and Possible Complications  MAC has some risks. These include:  · Breathing problems  · Nausea and vomiting  · Allergic reaction to the anesthetic    Anesthesia safety  · Follow all instructions you are given for how long not to eat or drink before your procedure.  · Be sure your doctor knows what medications you take, especially any anti-inflammatory medication or blood thinners. This includes aspirin and any other over-the-counter medications, herbs, and supplements.  · Have an adult family member or friend drive you home after the procedure.  · For the first 24 hours after your surgery:  ¨ Do not drive or use heavy equipment.  ¨ Do not make important decisions or sign documents.  ¨ Avoid alcohol.  ¨ Have someone stay with you, if possible. They can watch for problems and help keep you safe.  Date Last Reviewed: 10/16/2014  © 8294-6067 [employer]. [address] [place] [address], [place]  [address]. All rights reserved. This information is not intended as a substitute for professional medical care. Always follow your healthcare professional's instructions.            Admission Information     Date & Time Provider Department CSN    4/12/2017  9:30 AM Dirk Cummings DPM [facility] [id number]      Care Providers     Provider Role Specialty Primary office phone    Dirk Cummings DPM Attending Provider Podiatry [phone]      Recent Lab Values     No lab values to display.      Allergies as of 4/12/2017        Reactions    Bactrim [Sulfamethoxazole-trimethoprim] Rash    Severe itching and rash    Percocet [Oxycodone-acetaminophen] Rash      Advance Directives     An advance directive is a document which, in the event you are no longer able to make decisions for yourself, tells your healthcare team what kind of treatment you do or do not want to receive, or who you would like to make those decisions for you.  If you do not currently have an advance directive, [facility] encourages you to create one.  For more information call:  [phone]), [phone] ([phone]),  or log on to [email or web address].        Language Assistance Services     ATTENTION: Language assistance services are available, free of charge. Please call [phone].      ATENCIÓN: Si habla español, tiene a oconnor disposición servicios gratuitos de asistencia lingüística. Llame al [phone].     CHÚ Ý: N?u b?n nói Ti?ng Vi?t, có các d?ch v? h? tr? ngôn ng? mi?n phí dành cho b?n. G?i s? [phone].         [facility] complies with applicable Federal civil rights laws and does not discriminate on the basis of race, color, national origin, age, disability, or sex.

## 2017-04-12 NOTE — H&P
Umer Rodriguez is a 36 y.o., female scheduled for right toe debulking on 17.          Past Medical History:   Diagnosis Date    Allergy      Fibroids      ARNOL (iron deficiency anemia) 2014            Past Surgical History:   Procedure Laterality Date     SECTION   ,     FOOT SURGERY Right              Review of patient's allergies indicates:   Allergen Reactions    Bactrim [sulfamethoxazole-trimethoprim] Rash       Severe itching and rash    Percocet [oxycodone-acetaminophen] Rash      Prev Anes:  Previous foot surgeries under local/MAC. Records on file. Denies any problems with prev anesthesia.      [facility] Anesthesia Evaluation    I have reviewed the Patient Summary Reports.    I have reviewed the Nursing Notes.   I have reviewed the Medications.      Review of Systems  Anesthesia Hx:  No problems with previous Anesthesia   Social:  Non-Smoker, Social Alcohol Use   Hematology/Oncology:      Oncology Normal   -- Anemia:   EENT/Dental:EENT/Dental Normal   Cardiovascular:  Exercise tolerance: good Denies Hypertension. Denies MI. no hyperlipidemia   Pulmonary:  Pulmonary Normal   Renal/:  Renal/ Normal   Hepatic/GI:  Hepatic/GI Normal   Musculoskeletal:  Musculoskeletal Normal   Neurological:  Neurology Normal   Endocrine:  Endocrine Normal   Psych:  Psychiatric Normal         Physical Exam  General:  Well nourished    Airway/Jaw/Neck:  Airway Findings: Mouth Opening: Normal Tongue: Normal General Airway Assessment: Adult Mallampati: II Improves to I with phonation. TM Distance: Normal, at least 6 cm    Eyes/Ears/Nose:  EYES/EARS/NOSE FINDINGS: Normal   Dental:  DENTAL FINDINGS: Normal   Chest/Lungs:  Chest/Lungs Findings: Clear to auscultation, Normal Respiratory Rate    Heart/Vascular:  Heart Findings: Rate: Normal Rhythm: Regular Rhythm Sounds: Normal Heart murmur: negative    Abdomen:  Abdomen Findings: Normal    Musculoskeletal:  Musculoskeletal Findings: Normal    Skin:  Skin Findings: Normal    Mental Status:  Mental Status Findings: Normal             Lab Results   Component Value Date     WBC 5.31 04/10/2017     HGB 14.3 04/10/2017     HCT 42.9 04/10/2017     MCV 91 04/10/2017      04/10/2017      Chemistry         Component Value Date/Time      04/10/2017 1326     K 4.5 04/10/2017 1326      04/10/2017 1326     CO2 26 04/10/2017 1326     BUN 15 04/10/2017 1326     CREATININE 0.9 04/10/2017 1326     GLU 81 04/10/2017 1326               Component Value Date/Time     CALCIUM 9.1 04/10/2017 1326     ALKPHOS 54 (L) 10/21/2016 1127     AST 16 10/21/2016 1127     ALT 17 10/21/2016 1127     BILITOT 0.6 10/21/2016 1127          EKG 11/14/16  Sinus bradycardia  Otherwise normal ECG  No previous ECGs available        Anesthesia Plan  Type of Anesthesia, risks & benefits discussed:  Anesthesia Type: MAC, regional, general  Patient's Preference:   Intra-op Monitoring Plan: standard ASA monitors  Intra-op Monitoring Plan Comments:   Post Op Pain Control Plan:   Post Op Pain Control Plan Comments:   Induction:  IV  Beta Blocker: Patient is not currently on a Beta-Blocker (No further documentation required).     Informed Consent: Patient understands risks and agrees with Anesthesia plan. Questions answered. Anesthesia consent signed with patient.  ASA Score: 1    Day of Surgery Review of History & Physical: I have interviewed and examined the patient. I have reviewed the patient's H&P dated:  There are no significant changes.      Anesthesia Plan Notes:   UPT ordered for DOS

## 2017-04-12 NOTE — DISCHARGE INSTRUCTIONS
Your surgery is scheduled for 4/19/17.    Please report to Outpatient Surgery Intake Office on the 2nd FLOOR at 5:30 a.m.          INSTRUCTIONS IMPORTANT!!!  ¨ Do not eat or drink after 12 midnight-including water. OK to brush teeth, no   gum, candy or mints!            ____  Do not wear makeup, including mascara.  ____  No powder, lotions or creams to surgical area.  ____  Please remove all jewelry, including piercings and leave at home.  ____  No money or valuables needed. Please leave at home.  ____  Please bring any documents given by your doctor.  ____  If going home the same day, arrange for a ride home. You will not be able to             drive if Anesthesia was used.  ____  Wear loose fitting clothing. Allow for dressings, bandages.  ____  Stop Aspirin, Ibuprofen, Motrin and Aleve at least 3-5 days before surgery, unless otherwise instructed by your doctor, or the nurse.   You MAY use Tylenol/acetaminophen until day of surgery.  ____  Wash the surgical area with Hibiclens the night before surgery, and again the             morning of surgery.  Be sure to rinse hibiclens off completely (if instructed by   nurse).  ____  If you take diabetic medication, do not take am of surgery unless instructed by Doctor.  ____  Call MD for temperature above 101 degrees.  ____ Stop taking any Fish Oil supplement or any Vitamins that contain Vitamin E at least 5 days prior to surgery.  ____ Do Not wear your contact lenses the day of your procedure.  You may wear your glasses.        I have read or had read and explained to me, and understand the above information.  Additional comments or instructions:  For additional questions call [phone]     Take a Hibiclens shower twice a day for 3 days prior to surgery, including the morning of surgery.   Gargle with Listerine twice a day for 2 days prior to surgery, including the morning of surgery.      Pre-Op Bathing Instructions    Before surgery, you can play an important role in  your own health.    Because skin is not sterile, we need to be sure that your skin is as free of germs as possible. By following the instructions below, you can reduce the number of germs on your skin before surgery.    IMPORTANT: You will need to shower with a special soap called Hibiclens*, available at any pharmacy.  If you are allergic to Chlorhexidine (the antiseptic in Hibiclens), use an antibacterial soap such as Dial Soap for your preoperative shower.  You will shower with Hibiclens both the night before your surgery and the morning of your surgery.  Do not use Hibiclens on the head, face or genitals to avoid injury to those areas.    STEP #1: THE NIGHT BEFORE YOUR SURGERY     1. Do not shave the area of your body where your surgery will be performed.  2. Shower and wash your hair and body as usual with your normal soap and shampoo.  3. Rinse your hair and body thoroughly after you shower to remove all soap residue.  4. With your hand, apply one packet of Hibiclens soap to the surgical site.   5. Wash the site gently for five (5) minutes. Do not scrub your skin too hard.   6. Do not wash with your regular soap after Hibiclens is used.  7. Rinse your body thoroughly.  8. Pat yourself dry with a clean, soft towel.  9. Do not use lotion, cream, or powder.  10. Wear clean clothes.    STEP #2: THE MORNING OF YOUR SURGERY     1. Repeat Step #1.    * Not to be used by people allergic to Chlorhexidine.          Anesthesia: Monitored Anesthesia Care (MAC)  Youre due to have surgery. During surgery, youll be given medication called anesthesia. This will keep you comfortable and pain-free. Your surgeon will use monitored anesthesia care (MAC). This sheet tells you more about this type of anesthesia.    What is monitored anesthesia care?  MAC keeps you very drowsy during surgery. You may be awake, but you will likely not remember much. And you wont feel pain. With MAC, medications are given through an IV line into a  vein in your arm or hand. A local anesthetic will usually be injected into the skin and muscle around the surgical site to numb it. The anesthesia provider monitors you during the procedure. He or she checks your heart rate and rhythm, blood pressure, and blood oxygen level.  Anesthesia tools and medications that may be near you during your procedure  You will likely have:  · A pulse oximeter on the end of your finger. This measures your blood oxygen level.  · Electrocardiography leads (electrodes) on your chest. These record your heart rate and rhythm.  · Medications given through an IV. These relax you and prevent pain. You may be awake or sleep lightly. If you have local anesthetic, it is injected directly into your skin.  · A facemask to give you oxygen, if needed.  Risks and Possible Complications  MAC has some risks. These include:  · Breathing problems  · Nausea and vomiting  · Allergic reaction to the anesthetic    Anesthesia safety  · Follow all instructions you are given for how long not to eat or drink before your procedure.  · Be sure your doctor knows what medications you take, especially any anti-inflammatory medication or blood thinners. This includes aspirin and any other over-the-counter medications, herbs, and supplements.  · Have an adult family member or friend drive you home after the procedure.  · For the first 24 hours after your surgery:  ¨ Do not drive or use heavy equipment.  ¨ Do not make important decisions or sign documents.  ¨ Avoid alcohol.  ¨ Have someone stay with you, if possible. They can watch for problems and help keep you safe.  Date Last Reviewed: 10/16/2014  © 3661-5193 [employer]. [address] [place] [address], [place] [address]. All rights reserved. This information is not intended as a substitute for professional medical care. Always follow your healthcare professional's instructions.

## 2017-04-13 RX ORDER — FERROUS SULFATE 324(65)MG
325 TABLET, DELAYED RELEASE (ENTERIC COATED) ORAL DAILY
Qty: 30 TABLET | Refills: 11 | Status: SHIPPED | OUTPATIENT
Start: 2017-04-13 | End: 2019-02-07 | Stop reason: CLARIF

## 2017-04-19 ENCOUNTER — SURGERY (OUTPATIENT)
Age: 37
End: 2017-04-19

## 2017-04-19 ENCOUNTER — ANESTHESIA (OUTPATIENT)
Dept: SURGERY | Facility: HOSPITAL | Age: 37
End: 2017-04-19
Payer: MEDICAID

## 2017-04-19 ENCOUNTER — HOSPITAL ENCOUNTER (OUTPATIENT)
Facility: HOSPITAL | Age: 37
Discharge: HOME | End: 2017-04-19
Attending: PODIATRIST | Admitting: PODIATRIST
Payer: MEDICAID

## 2017-04-19 VITALS
WEIGHT: 160 LBS | RESPIRATION RATE: 18 BRPM | TEMPERATURE: 98 F | HEART RATE: 70 BPM | OXYGEN SATURATION: 96 % | DIASTOLIC BLOOD PRESSURE: 66 MMHG | SYSTOLIC BLOOD PRESSURE: 115 MMHG | BODY MASS INDEX: 30.21 KG/M2 | HEIGHT: 61 IN

## 2017-04-19 DIAGNOSIS — L91.0 HYPERTROPHIC SCAR: ICD-10-CM

## 2017-04-19 DIAGNOSIS — Z98.890 HISTORY OF FOOT SURGERY: ICD-10-CM

## 2017-04-19 LAB
B-HCG UR QL: NEGATIVE
CTP QC/QA: YES

## 2017-04-19 PROCEDURE — 25000003 PHARM REV CODE 250: Performed by: PODIATRIST

## 2017-04-19 PROCEDURE — 37000008 HC ANESTHESIA 1ST 15 MINUTES: Performed by: PODIATRIST

## 2017-04-19 PROCEDURE — 25000003 PHARM REV CODE 250: Performed by: NURSE ANESTHETIST, CERTIFIED REGISTERED

## 2017-04-19 PROCEDURE — 37000009 HC ANESTHESIA EA ADD 15 MINS: Performed by: PODIATRIST

## 2017-04-19 PROCEDURE — 71000015 HC POSTOP RECOV 1ST HR: Performed by: PODIATRIST

## 2017-04-19 PROCEDURE — 36000707: Performed by: PODIATRIST

## 2017-04-19 PROCEDURE — 25000003 PHARM REV CODE 250: Performed by: ANESTHESIOLOGY

## 2017-04-19 PROCEDURE — 81025 URINE PREGNANCY TEST: CPT | Performed by: ANESTHESIOLOGY

## 2017-04-19 PROCEDURE — 11420 EXC H-F-NK-SP B9+MARG 0.5/<: CPT | Mod: ,,, | Performed by: PODIATRIST

## 2017-04-19 PROCEDURE — 63600175 PHARM REV CODE 636 W HCPCS: Performed by: NURSE ANESTHETIST, CERTIFIED REGISTERED

## 2017-04-19 PROCEDURE — 36000706: Performed by: PODIATRIST

## 2017-04-19 RX ORDER — TRAMADOL HYDROCHLORIDE 50 MG/1
100 TABLET ORAL EVERY 6 HOURS PRN
Qty: 30 TABLET | Refills: 0 | Status: SHIPPED | OUTPATIENT
Start: 2017-04-19 | End: 2017-04-29

## 2017-04-19 RX ORDER — LIDOCAINE HYDROCHLORIDE 10 MG/ML
1 INJECTION, SOLUTION EPIDURAL; INFILTRATION; INTRACAUDAL; PERINEURAL ONCE
Status: DISCONTINUED | OUTPATIENT
Start: 2017-04-19 | End: 2017-04-19 | Stop reason: HOSPADM

## 2017-04-19 RX ORDER — PROPOFOL 10 MG/ML
VIAL (ML) INTRAVENOUS CONTINUOUS PRN
Status: DISCONTINUED | OUTPATIENT
Start: 2017-04-19 | End: 2017-04-19

## 2017-04-19 RX ORDER — PROPOFOL 10 MG/ML
VIAL (ML) INTRAVENOUS
Status: DISCONTINUED | OUTPATIENT
Start: 2017-04-19 | End: 2017-04-19

## 2017-04-19 RX ORDER — HYDROCODONE BITARTRATE AND ACETAMINOPHEN 5; 325 MG/1; MG/1
1 TABLET ORAL EVERY 4 HOURS PRN
Status: DISCONTINUED | OUTPATIENT
Start: 2017-04-19 | End: 2017-04-19 | Stop reason: HOSPADM

## 2017-04-19 RX ORDER — CEFAZOLIN SODIUM 2 G/50ML
2 SOLUTION INTRAVENOUS
Status: DISCONTINUED | OUTPATIENT
Start: 2017-04-19 | End: 2017-04-19 | Stop reason: HOSPADM

## 2017-04-19 RX ORDER — BUPIVACAINE HYDROCHLORIDE 2.5 MG/ML
INJECTION, SOLUTION EPIDURAL; INFILTRATION; INTRACAUDAL
Status: DISCONTINUED | OUTPATIENT
Start: 2017-04-19 | End: 2017-04-19 | Stop reason: HOSPADM

## 2017-04-19 RX ORDER — SODIUM CHLORIDE 9 MG/ML
INJECTION, SOLUTION INTRAVENOUS CONTINUOUS PRN
Status: DISCONTINUED | OUTPATIENT
Start: 2017-04-19 | End: 2017-04-19

## 2017-04-19 RX ORDER — SODIUM CHLORIDE, SODIUM LACTATE, POTASSIUM CHLORIDE, CALCIUM CHLORIDE 600; 310; 30; 20 MG/100ML; MG/100ML; MG/100ML; MG/100ML
INJECTION, SOLUTION INTRAVENOUS CONTINUOUS
Status: DISCONTINUED | OUTPATIENT
Start: 2017-04-19 | End: 2017-04-19 | Stop reason: HOSPADM

## 2017-04-19 RX ORDER — CEFAZOLIN SODIUM 1 G/3ML
INJECTION, POWDER, FOR SOLUTION INTRAMUSCULAR; INTRAVENOUS
Status: DISCONTINUED | OUTPATIENT
Start: 2017-04-19 | End: 2017-04-19

## 2017-04-19 RX ORDER — ONDANSETRON 2 MG/ML
INJECTION INTRAMUSCULAR; INTRAVENOUS
Status: DISCONTINUED | OUTPATIENT
Start: 2017-04-19 | End: 2017-04-19

## 2017-04-19 RX ORDER — LIDOCAINE HCL/PF 100 MG/5ML
SYRINGE (ML) INTRAVENOUS
Status: DISCONTINUED | OUTPATIENT
Start: 2017-04-19 | End: 2017-04-19

## 2017-04-19 RX ORDER — MIDAZOLAM HYDROCHLORIDE 1 MG/ML
INJECTION, SOLUTION INTRAMUSCULAR; INTRAVENOUS
Status: DISCONTINUED | OUTPATIENT
Start: 2017-04-19 | End: 2017-04-19

## 2017-04-19 RX ORDER — FENTANYL CITRATE 50 UG/ML
INJECTION, SOLUTION INTRAMUSCULAR; INTRAVENOUS
Status: DISCONTINUED | OUTPATIENT
Start: 2017-04-19 | End: 2017-04-19

## 2017-04-19 RX ADMIN — BUPIVACAINE HYDROCHLORIDE 6 ML: 2.5 INJECTION, SOLUTION EPIDURAL; INFILTRATION; INTRACAUDAL; PERINEURAL at 07:04

## 2017-04-19 RX ADMIN — PROPOFOL 150 MCG/KG/MIN: 10 INJECTION, EMULSION INTRAVENOUS at 07:04

## 2017-04-19 RX ADMIN — PROPOFOL 50 MG: 10 INJECTION, EMULSION INTRAVENOUS at 07:04

## 2017-04-19 RX ADMIN — LIDOCAINE HYDROCHLORIDE 100 MG: 20 INJECTION, SOLUTION INTRAVENOUS at 07:04

## 2017-04-19 RX ADMIN — SODIUM CHLORIDE, SODIUM LACTATE, POTASSIUM CHLORIDE, AND CALCIUM CHLORIDE: .6; .31; .03; .02 INJECTION, SOLUTION INTRAVENOUS at 06:04

## 2017-04-19 RX ADMIN — MIDAZOLAM 2 MG: 1 INJECTION INTRAMUSCULAR; INTRAVENOUS at 07:04

## 2017-04-19 RX ADMIN — ONDANSETRON 4 MG: 2 INJECTION, SOLUTION INTRAMUSCULAR; INTRAVENOUS at 07:04

## 2017-04-19 RX ADMIN — CEFAZOLIN 2 G: 330 INJECTION, POWDER, FOR SOLUTION INTRAMUSCULAR; INTRAVENOUS at 07:04

## 2017-04-19 RX ADMIN — FENTANYL CITRATE 25 MCG: 50 INJECTION, SOLUTION INTRAMUSCULAR; INTRAVENOUS at 07:04

## 2017-04-19 RX ADMIN — SODIUM CHLORIDE: 0.9 INJECTION, SOLUTION INTRAVENOUS at 06:04

## 2017-04-19 RX ADMIN — LIDOCAINE HYDROCHLORIDE 50 MG: 20 INJECTION, SOLUTION INTRAVENOUS at 07:04

## 2017-04-19 NOTE — OP NOTE
Operative Note       Surgery Date: 4/19/2017     Surgeon(s) and Role:     * Dirk Cummings DPM - Primary     * Devora Freedman MD - Resident - Assisting    Pre-op Diagnosis:  Hypertrophic scar [L91.0]  History of foot surgery [Z98.890]    Post-op Diagnosis: Post-Op Diagnosis Codes:     * Hypertrophic scar [L91.0]     * History of foot surgery [Z98.890]    Procedure(s) (LRB):  Excision of hypertrophic scar fifth toe (Right)    Anesthesia: Local MAC + 6ml of 0.25% marcaine plain    Procedure in Detail/Findings:    The patient was brought to the operating room on a stretcher and placed on the operating table in a supine position. Following the successful induction of MAC anesthesia, a tourniquet was applied to the patients right ankle. Following this, a local anesthetic block consisting of aproximately 6cc of  0.25% marcaine plain was injected. Then, the right foot was scrubbed, prepped and draped in the usual aseptic manner. A marking pen was utilized to create an incision guide in an elliptical fashion. A time out was performed and an esmark was used to exsanguinate the foot. The tourniquet was inflated to 250mmHg.      Attention was then directed to the hypertrophic scar at the right 5th toe medially.A #15 blade was then used to make an elliptical incision removing the hypertrophic scar. No underlying mass noted. Hypertrophic scar was excised in total.   Area was then closed with 4-0 prolene with simple sutures. Dressed with xeroform soaked in betadine wrapped with kerlix and ACE.     The patient tolerated the procedure and anesthesia well. She was transferred to the recovery room with vital signs stable, vascular status intact, and capillary refill time < 3 seconds to the distal right foot. Following a period of post op monitoring, the patient will be discharged home on the following written and oral post op instructions:   1. Keep dressing dry and intact until clinic visit.  2. Avoid excessive ambulation,  ambulate with surgical shoe on at all times.   3. Ice and elevate right foot when at rest.  4. All prescriptions were given to patient on pre-op clinic visit.  5. Contact Dr. Cummings for all post op follow up care and if any problems arise.    Devora Freedman DPM PGY-2        Estimated Blood Loss: * No values recorded between 4/19/2017  7:21 AM and 4/19/2017  7:40 AM *           Specimens     None        Implants: * No implants in log *           Disposition: PACU - hemodynamically stable.           Condition: Good    Attestation:  I was present and scrubbed for the entire procedure.           Discharge Note    Admit Date: 4/19/2017    Attending Physician: Dirk Cummings DPM     Discharge Physician: Dirk Cummings DPM    Final Diagnosis: Post-Op Diagnosis Codes:     * Hypertrophic scar [L91.0]     * History of foot surgery [Z98.890]    Disposition: Home or Self Care    Patient Instructions:   Current Discharge Medication List      START taking these medications    Details   tramadol (ULTRAM) 50 mg tablet Take 2 tablets (100 mg total) by mouth every 6 (six) hours as needed for Pain.  Qty: 30 tablet, Refills: 0         CONTINUE these medications which have NOT CHANGED    Details   ferrous sulfate 324 mg (65 mg iron) TbEC Take 1 tablet (325 mg total) by mouth once daily.  Qty: 30 tablet, Refills: 11    Associated Diagnoses: Iron deficiency anemia, unspecified iron deficiency anemia type      L norgest&E estradiol-E estrad (SEASONIQUE) 0.15 mg-30 mcg (84)/10 mcg (7) 3MPk Take 1 tablet by mouth once daily.  Qty: 90 each, Refills: 6    Associated Diagnoses: Contraception      triamcinolone acetonide 0.1% (KENALOG) 0.1 % cream Apply topically 3 (three) times daily.  Qty: 45 Tube, Refills: 2    Associated Diagnoses: Eczema, unspecified type      fluticasone (FLONASE) 50 mcg/actuation nasal spray 1 spray by Each Nare route once daily.  Qty: 1 Bottle, Refills: 11    Associated Diagnoses: Seasonal allergic rhinitis due  to pollen             Discharge Procedure Orders (must include Diet, Follow-up, Activity)    Discharge Procedure Orders (must include Diet, Follow-up, Activity)  Diet general     Ice to affected area     Keep surgical extremity elevated     Activity as tolerated     Call MD for:  temperature >100.4     Call MD for:  persistent nausea and vomiting     Call MD for:  severe uncontrolled pain     Call MD for:  difficulty breathing, headache or visual disturbances     Leave dressing on - Keep it clean, dry, and intact until clinic visit          Discharge Date: No discharge date for patient encounter.     I have personally taken the history and examined this patient and agree with the resident's note as stated as above.   Dirk Cummings DPM, FACFAS

## 2017-04-19 NOTE — H&P
Umer Rodriguez is a 36 y.o., female scheduled for right toe debulking on 17.          Past Medical History:   Diagnosis Date    Allergy      Fibroids      ARNOL (iron deficiency anemia) 2014            Past Surgical History:   Procedure Laterality Date     SECTION   ,     FOOT SURGERY Right              Review of patient's allergies indicates:   Allergen Reactions    Bactrim [sulfamethoxazole-trimethoprim] Rash       Severe itching and rash    Percocet [oxycodone-acetaminophen] Rash      Prev Anes:  Previous foot surgeries under local/MAC. Records on file. Denies any problems with prev anesthesia.      [facility] Anesthesia Evaluation    I have reviewed the Patient Summary Reports.    I have reviewed the Nursing Notes.   I have reviewed the Medications.      Review of Systems  Anesthesia Hx:  No problems with previous Anesthesia   Social:  Non-Smoker, Social Alcohol Use   Hematology/Oncology:      Oncology Normal   -- Anemia:   EENT/Dental:EENT/Dental Normal   Cardiovascular:  Exercise tolerance: good Denies Hypertension. Denies MI. no hyperlipidemia   Pulmonary:  Pulmonary Normal   Renal/:  Renal/ Normal   Hepatic/GI:  Hepatic/GI Normal   Musculoskeletal:  Musculoskeletal Normal   Neurological:  Neurology Normal   Endocrine:  Endocrine Normal   Psych:  Psychiatric Normal         Physical Exam  General:  Well nourished    Airway/Jaw/Neck:  Airway Findings: Mouth Opening: Normal Tongue: Normal General Airway Assessment: Adult Mallampati: II Improves to I with phonation. TM Distance: Normal, at least 6 cm    Eyes/Ears/Nose:  EYES/EARS/NOSE FINDINGS: Normal   Dental:  DENTAL FINDINGS: Normal   Chest/Lungs:  Chest/Lungs Findings: Clear to auscultation, Normal Respiratory Rate    Heart/Vascular:  Heart Findings: Rate: Normal Rhythm: Regular Rhythm Sounds: Normal Heart murmur: negative    Abdomen:  Abdomen Findings: Normal    Musculoskeletal:  Musculoskeletal Findings: Normal    Skin:  Skin Findings: Normal    Mental Status:  Mental Status Findings: Normal             Lab Results   Component Value Date     WBC 5.31 04/10/2017     HGB 14.3 04/10/2017     HCT 42.9 04/10/2017     MCV 91 04/10/2017      04/10/2017      Chemistry               Component Value Date/Time      04/10/2017 1326     K 4.5 04/10/2017 1326      04/10/2017 1326     CO2 26 04/10/2017 1326     BUN 15 04/10/2017 1326     CREATININE 0.9 04/10/2017 1326     GLU 81 04/10/2017 1326               Component Value Date/Time     CALCIUM 9.1 04/10/2017 1326     ALKPHOS 54 (L) 10/21/2016 1127     AST 16 10/21/2016 1127     ALT 17 10/21/2016 1127     BILITOT 0.6 10/21/2016 1127          EKG 11/14/16  Sinus bradycardia  Otherwise normal ECG  No previous ECGs available        Anesthesia Plan  Type of Anesthesia, risks & benefits discussed:  Anesthesia Type: MAC, regional, general  Patient's Preference:

## 2017-04-19 NOTE — DISCHARGE INSTRUCTIONS
Discharge Instructions for Foot Surgery  Arrange to have an adult drive you home after surgery. If you had general anesthesia, it may take a day or more to fully recover. So, for at least the next 24 hours: Do not drive or use machinery or power tools; do not drink alcohol; and do not make any major decisions.    Diet  Here are some dietary suggestions following surgery:   · Start with liquids and light foods (like dry toast, bananas, and applesauce). As you feel up to it, slowly return to your normal diet.  · Drink at least 6 to 8 glasses of water or other nonalcoholic fluids a day.  · To avoid nausea, eat before taking narcotic pain medicines.  Medicines  It is important to follow these directions:   · Take all medicines as instructed.  · Take pain medicines on time. Do not wait until the pain is bad before taking your medicines.  · Avoid alcohol while on pain medicines.  ·   Activity  These instructions are to help with your recovery:   · Sit or lie down when possible. Put a pillow under your heel to raise your foot above the level of your heart.  · Wrap an ice pack or bag of frozen peas in a thin cloth. Place it over your bandaged foot for no longer than 20 minutes. Do this three times a day.  · You can drive again in seven days or as instructed by your healthcare provider.  · Wear your surgical shoe at all times unless told otherwise by your healthcare provider.  · Use crutches or a cane as directed.  · Follow your healthcare providers instructions about putting weight on your foot.  ·   Bandage and cast care  Here are tips to follow:   · Do not shower for 48 hours.  · When you can shower again, cover the bandage or cast with a plastic bag to keep it dry.  · Dont remove your bandage until your healthcare provider tells you to. If your bandage gets wet or dirty, check with your healthcare provider. You can likely replace it with a clean, dry one.  ·   What to expect  It is normal to have the  following:  · Bruising and slight swelling of the foot and toes  · A small amount of blood on the dressing  Call your healthcare provider   Contact your healthcare provider right away if you have any of the following:   · Continuous bleeding through the bandage  · Excessive swelling, increased bleeding, or redness  · Fever over 100.4°F (38°C) or chills  · Pain unrelieved by pain medicines  · Foot feels cold to the touch or numb  · Increased ache in your leg or foot  · Chest pain or shortness of breath  · Anything unusual that concerns you     Rest, Ice and elevation of the foot.  OK to apply weight to the foot.  Keep the dressing in place, physician will remove at next clinic visit.

## 2017-04-19 NOTE — BRIEF OP NOTE
[facility]  Brief Operative Note     SUMMARY     Surgery Date: 4/19/2017     Surgeon(s) and Role:     * Dirk Cummings DPM - Primary     * Devora Freedman MD - Resident - Assisting        Pre-op Diagnosis:  Hypertrophic scar [L91.0]  History of foot surgery [Z98.890]    Post-op Diagnosis:  Post-Op Diagnosis Codes:     * Hypertrophic scar [L91.0]     * History of foot surgery [Z98.890]    Procedure(s) (LRB):  Excision of painful hypertrophic scar right fifth toe (Right)    Anesthesia: Local MA  Description of the findings of the procedure: per above    Findings/Key Components: per above    Estimated Blood Loss: * No values recorded between 4/19/2017  7:21 AM and 4/19/2017  7:35 AM *         Specimens:   Specimen     None          Discharge Note    SUMMARY     Admit Date: 4/19/2017    Discharge Date and Time:  04/19/2017 7:36 AM    Hospital Course (synopsis of major diagnoses, care, treatment, and services provided during the course of the hospital stay): admitted for outpatient procedure with uneventful stay     Final Diagnosis: Post-Op Diagnosis Codes:     * Hypertrophic scar [L91.0]     * History of foot surgery [Z98.890]    Disposition: Home or Self Care    Follow Up/Patient Instructions:     Medications:  Reconciled Home Medications:   Current Discharge Medication List      START taking these medications    Details   tramadol (ULTRAM) 50 mg tablet Take 2 tablets (100 mg total) by mouth every 6 (six) hours as needed for Pain.  Qty: 30 tablet, Refills: 0         CONTINUE these medications which have NOT CHANGED    Details   ferrous sulfate 324 mg (65 mg iron) TbEC Take 1 tablet (325 mg total) by mouth once daily.  Qty: 30 tablet, Refills: 11    Associated Diagnoses: Iron deficiency anemia, unspecified iron deficiency anemia type      L norgest&E estradiol-E estrad (SEASONIQUE) 0.15 mg-30 mcg (84)/10 mcg (7) 3MPk Take 1 tablet by mouth once daily.  Qty: 90 each, Refills: 6    Associated  Diagnoses: Contraception      triamcinolone acetonide 0.1% (KENALOG) 0.1 % cream Apply topically 3 (three) times daily.  Qty: 45 Tube, Refills: 2    Associated Diagnoses: Eczema, unspecified type      fluticasone (FLONASE) 50 mcg/actuation nasal spray 1 spray by Each Nare route once daily.  Qty: 1 Bottle, Refills: 11    Associated Diagnoses: Seasonal allergic rhinitis due to pollen             Discharge Procedure Orders  Diet general     Ice to affected area     Keep surgical extremity elevated     Activity as tolerated     Call MD for:  temperature >100.4     Call MD for:  persistent nausea and vomiting     Call MD for:  severe uncontrolled pain     Call MD for:  difficulty breathing, headache or visual disturbances     Leave dressing on - Keep it clean, dry, and intact until clinic visit       Follow-up Information     Follow up with Dirk Cummings DPM In 1 week.    Specialty:  Podiatry    Why:  For wound re-check    Contact information:    [address] [place] [address]  [phone]

## 2017-04-19 NOTE — IP AVS SNAPSHOT
[facility]  [address]  [place] [address]  Phone: [phone]           Patient Discharge Instructions   Our goal is to set you up for success. This packet includes information on your condition, medications, and your home care.  It will help you care for yourself to prevent having to return to the hospital.     Please ask your nurse if you have any questions.      There are many details to remember when preparing to leave the hospital. Here is what you will need to do:    1. Take your medicine. If you are prescribed medications, review your Medication List on the following pages. You may have new medications to  at the pharmacy and others that you'll need to stop taking. Review the instructions for how and when to take your medications. Talk with your doctor or nurses if you are unsure of what to do.     2. Go to your follow-up appointments. Specific follow-up information is listed in the following pages. Your may be contacted by a nurse or clinical provider about future appointments. Be sure we have all of the phone numbers to reach you. Please contact your provider's office if you are unable to make an appointment.     3. Watch for warning signs. Your doctor or nurse will give you detailed warning signs to watch for and when to call for assistance. These instructions may also include educational information about your condition. If you experience any of warning signs to your health, call your doctor.               ** Verify the list of medication(s) below is accurate and up to date. Carry this with you in case of emergency. If your medications have changed, please notify your healthcare provider.             Medication List      START taking these medications        Additional Info                      tramadol 50 mg tablet   Commonly known as:  ULTRAM   Quantity:  30 tablet   Refills:  0   Dose:  100 mg    Instructions:  Take 2 tablets (100 mg total) by mouth every 6 (six) hours  as needed for Pain.     Begin Date    AM    Noon    PM    Bedtime         CONTINUE taking these medications        Additional Info                      ferrous sulfate 324 mg (65 mg iron) Tbec   Quantity:  30 tablet   Refills:  11   Dose:  325 mg    Instructions:  Take 1 tablet (325 mg total) by mouth once daily.     Begin Date    AM    Noon    PM    Bedtime       fluticasone 50 mcg/actuation nasal spray   Commonly known as:  FLONASE   Quantity:  1 Bottle   Refills:  11   Dose:  1 spray    Instructions:  1 spray by Each Nare route once daily.     Begin Date    AM    Noon    PM    Bedtime       L norgest/e.estradiol-e.estrad 0.15 mg-30 mcg (84)/10 mcg (7) 3mpk   Commonly known as:  SEASONIQUE   Quantity:  90 each   Refills:  6   Dose:  1 tablet    Instructions:  Take 1 tablet by mouth once daily.     Begin Date    AM    Noon    PM    Bedtime       triamcinolone acetonide 0.1% 0.1 % cream   Commonly known as:  KENALOG   Quantity:  45 Tube   Refills:  2    Instructions:  Apply topically 3 (three) times daily.     Begin Date    AM    Noon    PM    Bedtime            Where to Get Your Medications      These medications were sent to [facility] - [address]  [address], [place] [address]     Phone:  [phone]     tramadol 50 mg tablet                  Please bring to all follow up appointments:    1. A copy of your discharge instructions.  2. All medicines you are currently taking in their original bottles.  3. Identification and insurance card.    Please arrive 15 minutes ahead of scheduled appointment time.    Please call 24 hours in advance if you must reschedule your appointment and/or time.        Your Scheduled Appointments     Apr 20, 2017  9:30 AM CDT   Mammo Diag with [facility]   [facility]    [address]  [facility] [address] [phone]            Apr 27, 2017 10:00 AM CDT   Post OP  with Dirk Cummings DPM   [facility] ([facility])    [address]  [place] [address]   [phone]            Jun 01, 2017  2:30 PM CDT   New Gynecological with Kimmy Malik MD   [facility] [address] ([facility])    [address]  [facility] [id number] [phone]              Follow-up Information     Follow up with Dirk Cummings DPM In 1 week.    Specialty:  Podiatry    Why:  For wound re-check    Contact information:    [address] [place] [address]  [phone]          Discharge Instructions     Future Orders    Activity as tolerated     Call MD for:  difficulty breathing, headache or visual disturbances     Call MD for:  persistent nausea and vomiting     Call MD for:  severe uncontrolled pain     Call MD for:  temperature >100.4     Diet general     Questions:    Total calories:      Fat restriction, if any:      Protein restriction, if any:      Na restriction, if any:      Fluid restriction:      Additional restrictions:      Leave dressing on - Keep it clean, dry, and intact until clinic visit         Discharge Instructions         Discharge Instructions for Foot Surgery  Arrange to have an adult drive you home after surgery. If you had general anesthesia, it may take a day or more to fully recover. So, for at least the next 24 hours: Do not drive or use machinery or power tools; do not drink alcohol; and do not make any major decisions.    Diet  Here are some dietary suggestions following surgery:   · Start with liquids and light foods (like dry toast, bananas, and applesauce). As you feel up to it, slowly return to your normal diet.  · Drink at least 6 to 8 glasses of water or other nonalcoholic fluids a day.  · To avoid nausea, eat before taking narcotic pain medicines.  Medicines  It is important to follow these directions:   · Take all medicines as instructed.  · Take pain medicines on time. Do not wait until the pain is  bad before taking your medicines.  · Avoid alcohol while on pain medicines.  ·   Activity  These instructions are to help with your recovery:   · Sit or lie down when possible. Put a pillow under your heel to raise your foot above the level of your heart.  · Wrap an ice pack or bag of frozen peas in a thin cloth. Place it over your bandaged foot for no longer than 20 minutes. Do this three times a day.  · You can drive again in seven days or as instructed by your healthcare provider.  · Wear your surgical shoe at all times unless told otherwise by your healthcare provider.  · Use crutches or a cane as directed.  · Follow your healthcare providers instructions about putting weight on your foot.  ·   Bandage and cast care  Here are tips to follow:   · Do not shower for 48 hours.  · When you can shower again, cover the bandage or cast with a plastic bag to keep it dry.  · Dont remove your bandage until your healthcare provider tells you to. If your bandage gets wet or dirty, check with your healthcare provider. You can likely replace it with a clean, dry one.  ·   What to expect  It is normal to have the following:  · Bruising and slight swelling of the foot and toes  · A small amount of blood on the dressing  Call your healthcare provider   Contact your healthcare provider right away if you have any of the following:   · Continuous bleeding through the bandage  · Excessive swelling, increased bleeding, or redness  · Fever over 100.4°F (38°C) or chills  · Pain unrelieved by pain medicines  · Foot feels cold to the touch or numb  · Increased ache in your leg or foot  · Chest pain or shortness of breath  · Anything unusual that concerns you     Rest, Ice and elevation of the foot.  OK to apply weight to the foot.  Keep the dressing in place, physician will remove at next clinic visit.            Primary Diagnosis     Your primary diagnosis was:  Overgrowth Of Scar Tissue      Admission Information     Date & Time  "Provider Department CSN    4/19/2017  5:34 AM Dirk Cummings DPM [facility] [id number]      Care Providers     Provider Role Specialty Primary office phone    Dirk Cummings DPM Attending Provider Podiatry [phone]    Dirk Cummings DPM Surgeon  Podiatry [phone]      Your Vitals Were     BP Pulse Temp Resp Height Weight    107/61 (BP Location: Right arm, Patient Position: Lying, BP Method: Automatic) 70 97.7 °F (36.5 °C) (Oral) 18 5' 1" (1.549 m) 72.6 kg (160 lb)    Last Period SpO2 BMI          01/19/2017 96% 30.23 kg/m2        Recent Lab Values     No lab values to display.      Allergies as of 4/19/2017        Reactions    Bactrim [Sulfamethoxazole-trimethoprim] Rash    Severe itching and rash    Percocet [Oxycodone-acetaminophen] Rash      [facility]     [facility] Care [facility] - 24/7 Assistance  Unless otherwise directed by your provider, please contact [facility], our nurse care line that is available for 24/7 assistance.     Registered nurses in the [facility] provide clinical advisement, health education, appointment booking, and other advisory services.  Call for this free service at [phone].        Advance Directives     An advance directive is a document which, in the event you are no longer able to make decisions for yourself, tells your healthcare team what kind of treatment you do or do not want to receive, or who you would like to make those decisions for you.  If you do not currently have an advance directive, [facility] encourages you to create one.  For more information call:  [phone] ([phone]),  or log on to [email or web address].        Language Assistance Services     ATTENTION: Language assistance services are available, free of charge. Please call [phone].      ATENCIÓN: Si habla español, tiene a oconnor disposición servicios gratuitos de asistencia lingüística. Llame al " [phone].     JESUS Ý: N?u b?n nói Ti?ng Vi?t, có các d?ch v? h? tr? ngôn ng? mi?n phí dành cho b?n. G?i s? [phone].         [facility] complies with applicable Federal civil rights laws and does not discriminate on the basis of race, color, national origin, age, disability, or sex.

## 2017-04-19 NOTE — ANESTHESIA POSTPROCEDURE EVALUATION
"Anesthesia Post Evaluation    Patient: Umer Rodriguez    Procedure(s) Performed: Procedure(s) (LRB):  DEBULKING TOE (Right)    Final Anesthesia Type: MAC  Patient location during evaluation: OPS  Patient participation: Yes- Able to Participate  Level of consciousness: awake and alert  Post-procedure vital signs: reviewed and stable  Pain management: adequate  Airway patency: patent  PONV status at discharge: No PONV  Anesthetic complications: no      Cardiovascular status: blood pressure returned to baseline and hemodynamically stable  Respiratory status: unassisted, spontaneous ventilation and room air  Hydration status: euvolemic  Follow-up not needed.        Visit Vitals    /70 (BP Location: Left arm, Patient Position: Lying, BP Method: Automatic)    Pulse (!) 52    Temp 36.6 °C (97.9 °F) (Oral)    Resp 20    Ht 5' 1" (1.549 m)    Wt 72.6 kg (160 lb)    LMP 01/19/2017    SpO2 99%    Breastfeeding No    BMI 30.23 kg/m2       Pain/Sissy Score: Pain Assessment Performed: Yes (4/19/2017  6:10 AM)  Presence of Pain: denies (4/19/2017  6:10 AM)  Sissy Score: 10 (4/19/2017  6:10 AM)      "

## 2017-04-19 NOTE — PLAN OF CARE
Criteria met for discharge. IV removed, instructions explained, copy given, Rx was delivered to bedside prior to discharge. Denies c/o pain,tolerating po well. Dressing remains CDI. Discharged home with family in good condition.

## 2017-04-19 NOTE — TRANSFER OF CARE
"Anesthesia Transfer of Care Note    Patient: Umer Rodriguez    Procedure(s) Performed: Procedure(s) (LRB):  DEBULKING TOE (Right)    Patient location: OPS    Anesthesia Type: MAC    Transport from OR: Transported from OR on room air with adequate spontaneous ventilation    Post pain: adequate analgesia    Post assessment: no apparent anesthetic complications and tolerated procedure well    Post vital signs: stable    Level of consciousness: awake, alert and oriented    Nausea/Vomiting: no nausea/vomiting    Complications: none          Last vitals:   Visit Vitals    /70 (BP Location: Left arm, Patient Position: Lying, BP Method: Automatic)    Pulse (!) 52    Temp 36.6 °C (97.9 °F) (Oral)    Resp 20    Ht 5' 1" (1.549 m)    Wt 72.6 kg (160 lb)    LMP 01/19/2017    SpO2 99%    Breastfeeding No    BMI 30.23 kg/m2     "

## 2017-04-20 ENCOUNTER — HOSPITAL ENCOUNTER (OUTPATIENT)
Dept: RADIOLOGY | Facility: OTHER | Age: 37
Discharge: HOME | End: 2017-04-20
Attending: FAMILY MEDICINE
Payer: MEDICAID

## 2017-04-20 DIAGNOSIS — N63.0 BREAST MASS IN FEMALE: ICD-10-CM

## 2017-04-20 PROCEDURE — 76642 ULTRASOUND BREAST LIMITED: CPT | Mod: 26,RT,, | Performed by: RADIOLOGY

## 2017-04-20 PROCEDURE — 77066 DX MAMMO INCL CAD BI: CPT | Mod: TC

## 2017-04-20 PROCEDURE — 77062 BREAST TOMOSYNTHESIS BI: CPT | Mod: 26,,, | Performed by: RADIOLOGY

## 2017-04-20 PROCEDURE — 76642 ULTRASOUND BREAST LIMITED: CPT | Mod: TC,RT

## 2017-04-20 PROCEDURE — 77066 DX MAMMO INCL CAD BI: CPT | Mod: 26,,, | Performed by: RADIOLOGY

## 2017-04-21 ENCOUNTER — TELEPHONE (OUTPATIENT)
Dept: FAMILY MEDICINE | Facility: CLINIC | Age: 37
End: 2017-04-21

## 2017-04-21 NOTE — TELEPHONE ENCOUNTER
Pt informed of results; verbalized understanding; does not want referral to breast center at this time

## 2017-04-21 NOTE — TELEPHONE ENCOUNTER
----- Message from Mally Loera MD sent at 4/21/2017  3:58 PM CDT -----  Please verify if patient seen in [facility], and if not would she like a consultation.

## 2017-04-24 NOTE — PHYSICIAN QUERY
PT Name: Umer Rodriguez  MR #: [id number]     Physician Query Form - Documentation Clarification      CDS/: Tanvi Crump               Contact information: [email or web address]    This form is a permanent document in the medical record.     Query Date: April 24, 2017    By submitting this query, we are merely seeking further clarification of documentation. Please utilize your independent clinical judgment when addressing the question(s) below.    The Medical record reflects the following:    Supporting Clinical Findings Location in Medical Record   Hypertrophic scar       Operative Report                                                                                      Dear Doctor, Please report the size of the hypertrophic scar excised.    Provider Use Only    1cm in length                                                                                                                           [  ] Clinically undetermined

## 2017-04-25 ENCOUNTER — PATIENT MESSAGE (OUTPATIENT)
Dept: FAMILY MEDICINE | Facility: CLINIC | Age: 37
End: 2017-04-25

## 2017-04-27 ENCOUNTER — OFFICE VISIT (OUTPATIENT)
Dept: PODIATRY | Facility: CLINIC | Age: 37
End: 2017-04-27
Payer: MEDICAID

## 2017-04-27 VITALS
HEART RATE: 70 BPM | BODY MASS INDEX: 30.21 KG/M2 | WEIGHT: 160 LBS | HEIGHT: 61 IN | DIASTOLIC BLOOD PRESSURE: 69 MMHG | SYSTOLIC BLOOD PRESSURE: 100 MMHG

## 2017-04-27 DIAGNOSIS — Z98.890 POSTOPERATIVE STATE: Primary | ICD-10-CM

## 2017-04-27 PROCEDURE — 99213 OFFICE O/P EST LOW 20 MIN: CPT | Mod: PBBFAC,PO | Performed by: PODIATRIST

## 2017-04-27 PROCEDURE — 99024 POSTOP FOLLOW-UP VISIT: CPT | Mod: ,,, | Performed by: PODIATRIST

## 2017-04-27 PROCEDURE — 99999 PR PBB SHADOW E&M-EST. PATIENT-LVL III: CPT | Mod: PBBFAC,,, | Performed by: PODIATRIST

## 2017-04-27 NOTE — MR AVS SNAPSHOT
[facility]   [address] [place] [address]  Phone: [phone]                  Umer Rodriguez   2017 10:00 AM   Office Visit    Description:  Female : 1980   Provider:  Dirk Cummings DPM   Department:  [facility]           Reason for Visit     Post-op Evaluation           Diagnoses this Visit        Comments    Postoperative state    -  Primary            To Do List           Future Appointments        Provider Department Dept Phone    2017 9:45 AM Dirk Cummings DPM [facility] [phone]    2017 2:30 PM Kimmy Malik MD [facility] - OB/GYN [address] [phone]      Goals (5 Years of Data)     None      Follow-Up and Disposition     Return in about 1 week (around 2017) for suture removal.      [facility] On Call     [facility] Nurse Care Line -  Assistance  Unless otherwise directed by your provider, please contact [facility], our nurse care line that is available for  assistance.     Registered nurses in the [facility] Call Center provide: appointment scheduling, clinical advisement, health education, and other advisory services.  Call: [phone] (toll free)               Medications           Message regarding Medications     Verify the changes and/or additions to your medication regime listed below are the same as discussed with your clinician today.  If any of these changes or additions are incorrect, please notify your healthcare provider.             Verify that the below list of medications is an accurate representation of the medications you are currently taking.  If none reported, the list may be blank. If incorrect, please contact your healthcare provider. Carry this list with you in case of emergency.           Current Medications     ferrous sulfate 324 mg (65 mg iron) TbEC Take 1 tablet (325 mg total) by mouth once daily.    fluticasone (FLONASE) 50 mcg/actuation nasal spray 1 spray by  "Each Nare route once daily.    L norgest&E estradiol-E estrad (SEASONIQUE) 0.15 mg-30 mcg (84)/10 mcg (7) 3MPk Take 1 tablet by mouth once daily.    tramadol (ULTRAM) 50 mg tablet Take 2 tablets (100 mg total) by mouth every 6 (six) hours as needed for Pain.    triamcinolone acetonide 0.1% (KENALOG) 0.1 % cream Apply topically 3 (three) times daily.           Clinical Reference Information           Your Vitals Were     BP Pulse Height Weight Last Period BMI    100/69 70 5' 1" (1.549 m) 72.6 kg (160 lb) 01/19/2017 30.23 kg/m2      Blood Pressure          Most Recent Value    BP  100/69      Allergies as of 4/27/2017     Bactrim [Sulfamethoxazole-trimethoprim]    Percocet [Oxycodone-acetaminophen]      Immunizations Administered on Date of Encounter - 4/27/2017     None      Language Assistance Services     ATTENTION: Language assistance services are available, free of charge. Please call [phone].      ATENCIÓN: Si habla robiañol, tiene a oconnor disposición servicios gratuitos de asistencia lingüística. Llame al [phone].     JESUS Ý: N?u b?n nói Ti?ng Vi?t, có các d?ch v? h? tr? ngôn ng? mi?n phí dành cho b?n. G?i s? [phone].         [facility] complies with applicable Federal civil rights laws and does not discriminate on the basis of race, color, national origin, age, disability, or sex.        "

## 2017-04-28 NOTE — PROGRESS NOTES
"Subjective:      Patient ID: Umer Rodriguez is a 36 y.o. female.    Chief Complaint: Post-op Evaluation (Dressing Change)    Post revision arthrodesis with autograft from the calcaneus of fourth toe and fifth toe arthroplasty on 16. Relates a week ago she developed severe pain in right heel. Pain resolved with rest and limited walking. No pain today. Ambulating with orthopedic boot.    3/27/17: Returns complaining of localized pain to the top of the right fifth toe pointing to an area of thickened skin. Says it rubs when in enclosed shoes and causes her discomfort. Applying silicone scar cream, however no improvement.    17: S/p one week scar excision right 5th toe. Denies pain to area. No strike through noted to bandage.      Vitals:    17 1005   BP: 100/69   Pulse: 70   Weight: 72.6 kg (160 lb)   Height: 5' 1" (1.549 m)   PainSc: 0-No pain      Past Medical History:   Diagnosis Date    Allergy     Fibroids     ARNOL (iron deficiency anemia) 2014       Past Surgical History:   Procedure Laterality Date     SECTION  ,     FOOT SURGERY Right        Family History   Problem Relation Age of Onset    Hypertension Mother     Breast cancer Neg Hx     Colon cancer Neg Hx     Ovarian cancer Neg Hx     Hyperlipidemia Neg Hx     Heart disease Neg Hx     Diabetes Neg Hx     Cancer Neg Hx     Arthritis Neg Hx        Social History     Social History    Marital status: Single     Spouse name: N/A    Number of children: N/A    Years of education: N/A     Social History Main Topics    Smoking status: Never Smoker    Smokeless tobacco: Never Used    Alcohol use No      Comment: occasional    Drug use: No    Sexual activity: Yes     Partners: Male     Birth control/ protection: None     Other Topics Concern    None     Social History Narrative       Current Outpatient Prescriptions   Medication Sig Dispense Refill    ferrous sulfate 324 mg (65 mg iron) TbEC Take 1 " tablet (325 mg total) by mouth once daily. 30 tablet 11    fluticasone (FLONASE) 50 mcg/actuation nasal spray 1 spray by Each Nare route once daily. 1 Bottle 11    L norgest&E estradiol-E estrad (SEASONIQUE) 0.15 mg-30 mcg (84)/10 mcg (7) 3MPk Take 1 tablet by mouth once daily. 90 each 6    tramadol (ULTRAM) 50 mg tablet Take 2 tablets (100 mg total) by mouth every 6 (six) hours as needed for Pain. 30 tablet 0    triamcinolone acetonide 0.1% (KENALOG) 0.1 % cream Apply topically 3 (three) times daily. 45 Tube 2     No current facility-administered medications for this visit.        Review of patient's allergies indicates:  No Known Allergies      Review of Systems   Constitution: Negative for chills, fever, weakness and malaise/fatigue.   Cardiovascular: Negative for chest pain, claudication and leg swelling.   Respiratory: Negative for cough and shortness of breath.    Skin: Negative for itching and rash.   Musculoskeletal: Negative for back pain, joint pain, muscle cramps and muscle weakness.   Gastrointestinal: Negative for nausea and vomiting.   Neurological: Negative for numbness and paresthesias.   Psychiatric/Behavioral: Negative for altered mental status.           Objective:      Physical Exam   Constitutional: She is oriented to person, place, and time. She appears well-developed and well-nourished. No distress.   Cardiovascular: Intact distal pulses.    Pulses:       Dorsalis pedis pulses are 2+ on the right side, and 2+ on the left side.        Posterior tibial pulses are 2+ on the right side, and 2+ on the left side.   Musculoskeletal:        Feet:    Rectus alignment toes 1-4 right foot. No available motion hallux IPJ and toes 2,3 secondary to arthrodesis. Right fourth toe is iatrogenically shortened and flexible.     Left foot medial longitudinal arch is moderately depressed with foot loading compared to left. Mild gastrocnemius equinus bilateral foot. Flexible pes planus bilateral foot.    Pain  plantar right heel. No pain with medial and lateral compression of heel. No pain with MMT.    Neurological: She is alert and oriented to person, place, and time. She has normal strength. No sensory deficit.   Skin: Skin is warm, dry and intact. No ecchymosis and no rash noted. She is not diaphoretic. No cyanosis or erythema. No pallor. Nails show no clubbing.   Sutures intact skin edges well coapted. No hematoma formation noted. No purulent drainage noted.     No open lesions or macerations bilateral lower extremity.                     Assessment:       Encounter Diagnosis   Name Primary?    Postoperative state Yes         Plan:       Umer was seen today for post-op evaluation.    Diagnoses and all orders for this visit:    Postoperative state      I counseled the patient on her conditions, their implications and medical management.    Sutures intact skin edges well coapted no hematoma formation noted.  Triple abx applied covered with mepilex border. May shower do not soak foot.   RTC 1 week postop.    Devora Freedman DPM PGY-2    I have personally taken the history and examined this patient and agree with the resident's note as stated as above.   Dirk Cummings DPM, FACFAS

## 2017-05-08 ENCOUNTER — OFFICE VISIT (OUTPATIENT)
Dept: PODIATRY | Facility: CLINIC | Age: 37
End: 2017-05-08
Payer: MEDICAID

## 2017-05-08 VITALS
DIASTOLIC BLOOD PRESSURE: 72 MMHG | HEART RATE: 64 BPM | HEIGHT: 61 IN | SYSTOLIC BLOOD PRESSURE: 111 MMHG | WEIGHT: 160 LBS | BODY MASS INDEX: 30.21 KG/M2

## 2017-05-08 DIAGNOSIS — Z98.890 POSTOPERATIVE STATE: Primary | ICD-10-CM

## 2017-05-08 PROCEDURE — 99024 POSTOP FOLLOW-UP VISIT: CPT | Mod: ,,, | Performed by: PODIATRIST

## 2017-05-08 PROCEDURE — 99999 PR PBB SHADOW E&M-EST. PATIENT-LVL III: CPT | Mod: PBBFAC,,, | Performed by: PODIATRIST

## 2017-05-08 PROCEDURE — 99213 OFFICE O/P EST LOW 20 MIN: CPT | Mod: PBBFAC,PO | Performed by: PODIATRIST

## 2017-05-08 NOTE — MR AVS SNAPSHOT
[facility]   [address] [place] [address]  Phone: [phone]                  Umer Rodriguez   2017 9:45 AM   Office Visit    Description:  Female : 1980   Provider:  Dirk Cummings DPM   Department:  [facility]           Reason for Visit     Post-op Evaluation           Diagnoses this Visit        Comments    Postoperative state    -  Primary            To Do List           Future Appointments        Provider Department Dept Phone    2017 2:30 PM Kimmy Malik MD [facility] - OB/GYN [address] [phone]      Goals (5 Years of Data)     None      Follow-Up and Disposition     Return if symptoms worsen or fail to improve.      [facility] On Call     [facility] On Call Nurse Care Line -  Assistance  Unless otherwise directed by your provider, please contact [facility] On-Call, our nurse care line that is available for  assistance.     Registered nurses in the [facility] On Call Center provide: appointment scheduling, clinical advisement, health education, and other advisory services.  Call: [phone] (toll free)               Medications           Message regarding Medications     Verify the changes and/or additions to your medication regime listed below are the same as discussed with your clinician today.  If any of these changes or additions are incorrect, please notify your healthcare provider.             Verify that the below list of medications is an accurate representation of the medications you are currently taking.  If none reported, the list may be blank. If incorrect, please contact your healthcare provider. Carry this list with you in case of emergency.           Current Medications     ferrous sulfate 324 mg (65 mg iron) TbEC Take 1 tablet (325 mg total) by mouth once daily.    fluticasone (FLONASE) 50 mcg/actuation nasal spray 1 spray by Each Nare route once daily.    L norgest&E estradiol-E estrad (SEASONIQUE) 0.15 mg-30 mcg (84)/10  "mcg (7) 3MPk Take 1 tablet by mouth once daily.    triamcinolone acetonide 0.1% (KENALOG) 0.1 % cream Apply topically 3 (three) times daily.           Clinical Reference Information           Your Vitals Were     BP Pulse Height Weight Last Period BMI    111/72 64 5' 1" (1.549 m) 72.6 kg (160 lb) 01/19/2017 30.23 kg/m2      Blood Pressure          Most Recent Value    BP  111/72      Allergies as of 5/8/2017     Bactrim [Sulfamethoxazole-trimethoprim]    Percocet [Oxycodone-acetaminophen]      Immunizations Administered on Date of Encounter - 5/8/2017     None      Language Assistance Services     ATTENTION: Language assistance services are available, free of charge. Please call [phone].      ATENCIÓN: Si trudy jack, tiene a oconnor disposición servicios gratuitos de asistencia lingüística. Llame al [phone].     JESUS Ý: N?u b?n nói Ti?ng Vi?t, có các d?ch v? h? tr? ngôn ng? mi?n phí dành cho b?n. G?i s? [phone].         [facility] complies with applicable Federal civil rights laws and does not discriminate on the basis of race, color, national origin, age, disability, or sex.        "

## 2017-05-08 NOTE — PROGRESS NOTES
"Subjective:      Patient ID: Umer Rodriguez is a 36 y.o. female.    Chief Complaint: Post-op Evaluation (Suture Removal)    Post revision arthrodesis with autograft from the calcaneus of fourth toe and fifth toe arthroplasty on 16. Relates a week ago she developed severe pain in right heel. Pain resolved with rest and limited walking. No pain today. Ambulating with orthopedic boot.    3/27/17: Returns complaining of localized pain to the top of the right fifth toe pointing to an area of thickened skin. Says it rubs when in enclosed shoes and causes her discomfort. Applying silicone scar cream, however no improvement.    17:  S/p three weeks scar excision right 5th toe. Denies pain to area. No acute changes.          Vitals:    17 1016   BP: 111/72   Pulse: 64   Weight: 72.6 kg (160 lb)   Height: 5' 1" (1.549 m)   PainSc: 0-No pain      Past Medical History:   Diagnosis Date    Allergy     Fibroids     ARNOL (iron deficiency anemia) 2014       Past Surgical History:   Procedure Laterality Date     SECTION  ,     FOOT SURGERY Right        Family History   Problem Relation Age of Onset    Hypertension Mother     Breast cancer Neg Hx     Colon cancer Neg Hx     Ovarian cancer Neg Hx     Hyperlipidemia Neg Hx     Heart disease Neg Hx     Diabetes Neg Hx     Cancer Neg Hx     Arthritis Neg Hx        Social History     Social History    Marital status: Single     Spouse name: N/A    Number of children: N/A    Years of education: N/A     Social History Main Topics    Smoking status: Never Smoker    Smokeless tobacco: Never Used    Alcohol use No      Comment: occasional    Drug use: No    Sexual activity: Yes     Partners: Male     Birth control/ protection: None     Other Topics Concern    None     Social History Narrative       Current Outpatient Prescriptions   Medication Sig Dispense Refill    ferrous sulfate 324 mg (65 mg iron) TbEC Take 1 tablet (325 mg " total) by mouth once daily. 30 tablet 11    fluticasone (FLONASE) 50 mcg/actuation nasal spray 1 spray by Each Nare route once daily. 1 Bottle 11    L norgest&E estradiol-E estrad (SEASONIQUE) 0.15 mg-30 mcg (84)/10 mcg (7) 3MPk Take 1 tablet by mouth once daily. 90 each 6    triamcinolone acetonide 0.1% (KENALOG) 0.1 % cream Apply topically 3 (three) times daily. 45 Tube 2     No current facility-administered medications for this visit.        Review of patient's allergies indicates:  No Known Allergies      Review of Systems   Constitution: Negative for chills, fever, weakness and malaise/fatigue.   Cardiovascular: Negative for chest pain, claudication and leg swelling.   Respiratory: Negative for cough and shortness of breath.    Skin: Negative for itching and rash.   Musculoskeletal: Negative for back pain, joint pain, muscle cramps and muscle weakness.   Gastrointestinal: Negative for nausea and vomiting.   Neurological: Negative for numbness and paresthesias.   Psychiatric/Behavioral: Negative for altered mental status.           Objective:      Physical Exam   Constitutional: She is oriented to person, place, and time. She appears well-developed and well-nourished. No distress.   Cardiovascular: Intact distal pulses.    Pulses:       Dorsalis pedis pulses are 2+ on the right side, and 2+ on the left side.        Posterior tibial pulses are 2+ on the right side, and 2+ on the left side.   Musculoskeletal:        Feet:    Rectus alignment toes 1-4 right foot. No available motion hallux IPJ and toes 2,3 secondary to arthrodesis. Right fourth toe is iatrogenically shortened and flexible.     Left foot medial longitudinal arch is moderately depressed with foot loading compared to left. Mild gastrocnemius equinus bilateral foot. Flexible pes planus bilateral foot.    Pain plantar right heel. No pain with medial and lateral compression of heel. No pain with MMT.    Neurological: She is alert and oriented to  person, place, and time. She has normal strength. No sensory deficit.   Skin: Skin is warm, dry and intact. No ecchymosis and no rash noted. She is not diaphoretic. No cyanosis or erythema. No pallor. Nails show no clubbing.   Sutures intact skin edges well coapted and skin well healed. No hematoma formation noted. No drainage noted.     No open lesions or macerations bilateral lower extremity.                     Assessment:       Encounter Diagnosis   Name Primary?    Postoperative state Yes         Plan:       Umer was seen today for post-op evaluation.    Diagnoses and all orders for this visit:    Postoperative state    I counseled the patient on her conditions, their implications and medical management.    Sutures removed without incident, incision well healed with minimal scar formation.   -She can shower but not to soak the foot for another few days. Can lotion to the area as needed for dry skin.   -Return to full activity      RTC PRN    Janak Douglas DPM PGY-3    I have personally taken the history and examined this patient and agree with the resident's note as stated as above.   Dirk Cummings DPM, FACFAS

## 2017-08-22 ENCOUNTER — PATIENT MESSAGE (OUTPATIENT)
Dept: FAMILY MEDICINE | Facility: CLINIC | Age: 37
End: 2017-08-22

## 2017-08-28 ENCOUNTER — PATIENT MESSAGE (OUTPATIENT)
Dept: FAMILY MEDICINE | Facility: CLINIC | Age: 37
End: 2017-08-28

## 2017-08-28 ENCOUNTER — OFFICE VISIT (OUTPATIENT)
Dept: FAMILY MEDICINE | Facility: CLINIC | Age: 37
End: 2017-08-28
Payer: MEDICAID

## 2017-08-28 VITALS
HEART RATE: 67 BPM | BODY MASS INDEX: 29.05 KG/M2 | TEMPERATURE: 99 F | WEIGHT: 153.88 LBS | DIASTOLIC BLOOD PRESSURE: 74 MMHG | SYSTOLIC BLOOD PRESSURE: 102 MMHG | HEIGHT: 61 IN | OXYGEN SATURATION: 98 %

## 2017-08-28 DIAGNOSIS — J02.9 SORE THROAT: ICD-10-CM

## 2017-08-28 DIAGNOSIS — L20.9 ATOPIC DERMATITIS, MILD: ICD-10-CM

## 2017-08-28 DIAGNOSIS — J30.9 ACUTE ALLERGIC RHINITIS, UNSPECIFIED SEASONALITY, UNSPECIFIED TRIGGER: Primary | ICD-10-CM

## 2017-08-28 LAB
CTP QC/QA: YES
S PYO RRNA THROAT QL PROBE: NEGATIVE

## 2017-08-28 PROCEDURE — 3008F BODY MASS INDEX DOCD: CPT | Mod: ,,, | Performed by: NURSE PRACTITIONER

## 2017-08-28 PROCEDURE — 99213 OFFICE O/P EST LOW 20 MIN: CPT | Mod: PBBFAC,PO | Performed by: NURSE PRACTITIONER

## 2017-08-28 PROCEDURE — 87880 STREP A ASSAY W/OPTIC: CPT | Mod: PBBFAC,PO | Performed by: NURSE PRACTITIONER

## 2017-08-28 PROCEDURE — 99999 PR PBB SHADOW E&M-EST. PATIENT-LVL III: CPT | Mod: PBBFAC,,, | Performed by: NURSE PRACTITIONER

## 2017-08-28 PROCEDURE — 99214 OFFICE O/P EST MOD 30 MIN: CPT | Mod: S$PBB,,, | Performed by: NURSE PRACTITIONER

## 2017-08-28 RX ORDER — METHYLPREDNISOLONE 4 MG/1
TABLET ORAL
Qty: 1 PACKAGE | Refills: 0 | Status: SHIPPED | OUTPATIENT
Start: 2017-08-28 | End: 2017-09-18

## 2017-08-28 RX ORDER — LEVOCETIRIZINE DIHYDROCHLORIDE 5 MG/1
5 TABLET, FILM COATED ORAL NIGHTLY
Qty: 30 TABLET | Refills: 3 | Status: SHIPPED | OUTPATIENT
Start: 2017-08-28 | End: 2017-10-16

## 2017-08-28 NOTE — PATIENT INSTRUCTIONS
Allergic Rhinitis  Allergic rhinitis is an allergic reaction that affects the nose, and often the eyes. Its often known as nasal allergies. Nasal allergies are often due to things in the environment that are breathed in. Depending what you are sensitive to, nasal allergies may occur only during certain seasons. Or they may occur year round. Common indoor allergens include house dust mites, mold, cockroaches, and pet dander. Outdoor allergens include pollen from trees, grasses, and weeds.   Symptoms include a drippy, stuffy, and itchy nose. They also include sneezing and red and itchy eyes. You may feel tired more often. Severe allergies may also affect your breathing and trigger a condition called asthma.   Tests can be done to see what allergens are affecting you. You may be referred to an allergy specialist for testing and further evaluation.  Home care  The healthcare provider may prescribe medicines to help relieve allergy symptoms.   Ask the provider for advice on how to avoid substances that you are allergic to. Below are a few tips for each type of allergen.  Pet dander:  · Do not have pets with fur and feathers.  · If you cannot avoid having a pet, keep it out of your bedroom and off upholstered furniture.  Pollen:  · When pollen counts are high, keep windows of your car and home closed. If possible, use an air conditioner instead.  · Wear a filter mask when mowing or doing yard work.  House dust mites:  · Wash bedding every week in warm water and detergent and dry on a hot setting.  · Cover the mattress, box spring, and pillows with allergy covers.   · If possible, sleep in a room with no carpet, curtains, or upholstered furniture.  Cockroaches:  · Store food in sealed containers.  · Remove garbage from the home promptly.  · Fix water leaks  Mold:  · Keep humidity low by using a dehumidifier or air conditioner. Keep the dehumidifier and air conditioner clean and free of mold.  · Clean moldy areas with  bleach and water.  In general:  · Vacuum once or twice a week. If possible, use a vacuum with a high-efficiency particulate air (HEPA) filter.  · Do not smoke. Avoid cigarette smoke. Cigarette smoke is an irritant that can make symptoms worse.  Follow-up care  Follow up as advised by the health care provider or our staff. If you were referred to an allergy specialist, make this appointment promptly.  When to seek medical advice  Call your healthcare provider right away if the following occur:  · Coughing or wheezing  · Fever greater than 100.4°F (38°C)  · Continuing symptoms, new symptoms, or worsening symptoms  Call 911 right away if you have:  · Trouble breathing  · Hives (raised red bumps)  · Severe swelling of the face or severe itching of the eyes or mouth  Date Last Reviewed: 4/26/2015  © 2620-7170 [employer]. [address] [place] [address], [place] [address]. All rights reserved. This information is not intended as a substitute for professional medical care. Always follow your healthcare professional's instructions.

## 2017-08-28 NOTE — PROGRESS NOTES
Subjective:       Patient ID: Umer Rodriguez is a 37 y.o. female.    Chief Complaint: Sore Throat and Eczema    Sore Throat    This is a new problem. The current episode started in the past 7 days. The problem has been waxing and waning. There has been no fever. The pain is at a severity of 4/10. The pain is moderate. Associated symptoms include coughing (minimal), a hoarse voice, swollen glands and trouble swallowing (hurts to swallow). Pertinent negatives include no abdominal pain, congestion, diarrhea, drooling, ear discharge, ear pain, headaches, plugged ear sensation, neck pain, shortness of breath, stridor or vomiting. She has had no exposure to strep or mono. Treatments tried: bendaryl.   Rash   This is a new problem. The current episode started in the past 7 days. The problem is unchanged. The affected locations include the neck. Associated symptoms include coughing (minimal) and a sore throat. Pertinent negatives include no anorexia, congestion, diarrhea, facial edema, fatigue, fever, joint pain, nail changes, rhinorrhea, shortness of breath or vomiting. Past treatments include topical steroids.     Review of Systems   Constitutional: Negative for activity change, appetite change, chills, fatigue and fever.   HENT: Positive for hoarse voice, sore throat, trouble swallowing (hurts to swallow) and voice change. Negative for congestion, drooling, ear discharge, ear pain, postnasal drip, rhinorrhea, sinus pressure and sneezing.    Respiratory: Positive for cough (minimal). Negative for shortness of breath, wheezing and stridor.    Cardiovascular: Negative for chest pain.   Gastrointestinal: Negative for abdominal pain, anorexia, diarrhea, nausea and vomiting.   Musculoskeletal: Negative for joint pain and neck pain.   Skin: Positive for rash. Negative for nail changes.   Neurological: Negative for headaches.       Objective:      Physical Exam   Constitutional: She appears well-developed and  well-nourished. No distress.   HENT:   Head: Normocephalic and atraumatic.   Right Ear: Tympanic membrane, external ear and ear canal normal.   Left Ear: Tympanic membrane, external ear and ear canal normal.   Nose: Mucosal edema present.   Mouth/Throat: Oropharynx is clear and moist. Mucous membranes are not dry. No oropharyngeal exudate, posterior oropharyngeal edema or posterior oropharyngeal erythema.   Cardiovascular: Normal rate and regular rhythm.    No murmur heard.  Pulmonary/Chest: Effort normal and breath sounds normal. She has no wheezes. She has no rales.   Lymphadenopathy:     She has no cervical adenopathy.   Skin: Skin is warm and dry. Capillary refill takes less than 2 seconds. Rash noted.        Psychiatric: She has a normal mood and affect.   Nursing note and vitals reviewed.      Assessment:       1. Acute allergic rhinitis, unspecified seasonality, unspecified trigger    2. Atopic dermatitis, mild    3. Sore throat        Plan:       Umer was seen today for sore throat and eczema.    Diagnoses and all orders for this visit:    Acute allergic rhinitis, unspecified seasonality, unspecified trigger  -     levocetirizine (XYZAL) 5 MG tablet; Take 1 tablet (5 mg total) by mouth every evening.  -     methylPREDNISolone (MEDROL DOSEPACK) 4 mg tablet; use as directed    Atopic dermatitis, mild  The current medical regimen is effective;  continue present plan and medications.    Sore throat  -     POCT Rapid Strep A  Increase your water intake to 64-80 oz daily    Nasal Saline spray (Over the counter Dixie spray or Ayr)  2 sprays each nostril 2-3 times a day for nasal congestion    Tylenol 500 mg 2 tablets or Ibuprofen 200 mg 2 tablets every 4-6 hours as needed for fever, headaches, sore throat, ear pain, bodyaches, and/or nasal/sinus inflammation    Warm salt water gargles with 1/2 cup water and 1 tablespoon salt every 4 hours    Warm tea with honey and lemon    Follow up with PCP in 1 week if  symptoms do not resolve

## 2017-10-16 ENCOUNTER — OFFICE VISIT (OUTPATIENT)
Dept: URGENT CARE | Facility: CLINIC | Age: 37
End: 2017-10-16
Payer: MEDICAID

## 2017-10-16 VITALS
OXYGEN SATURATION: 98 % | WEIGHT: 150 LBS | HEIGHT: 61 IN | SYSTOLIC BLOOD PRESSURE: 121 MMHG | TEMPERATURE: 98 F | RESPIRATION RATE: 16 BRPM | BODY MASS INDEX: 28.32 KG/M2 | HEART RATE: 85 BPM | DIASTOLIC BLOOD PRESSURE: 78 MMHG

## 2017-10-16 DIAGNOSIS — J02.0 STREP PHARYNGITIS: Primary | ICD-10-CM

## 2017-10-16 LAB
CTP QC/QA: YES
S PYO RRNA THROAT QL PROBE: POSITIVE

## 2017-10-16 PROCEDURE — 87880 STREP A ASSAY W/OPTIC: CPT | Mod: QW,S$GLB,, | Performed by: NURSE PRACTITIONER

## 2017-10-16 PROCEDURE — 99214 OFFICE O/P EST MOD 30 MIN: CPT | Mod: 25,S$GLB,, | Performed by: NURSE PRACTITIONER

## 2017-10-16 RX ORDER — AMOXICILLIN AND CLAVULANATE POTASSIUM 875; 125 MG/1; MG/1
1 TABLET, FILM COATED ORAL 2 TIMES DAILY
Qty: 20 TABLET | Refills: 0 | Status: SHIPPED | OUTPATIENT
Start: 2017-10-16 | End: 2017-10-26

## 2017-10-16 RX ORDER — DEXAMETHASONE SODIUM PHOSPHATE 100 MG/10ML
7 INJECTION INTRAMUSCULAR; INTRAVENOUS ONCE
Status: COMPLETED | OUTPATIENT
Start: 2017-10-16 | End: 2017-10-16

## 2017-10-16 RX ADMIN — DEXAMETHASONE SODIUM PHOSPHATE 7 MG: 100 INJECTION INTRAMUSCULAR; INTRAVENOUS at 04:10

## 2017-10-16 NOTE — PATIENT INSTRUCTIONS
Pharyngitis   If your condition worsens or fails to improve we recommend that you receive another evaluation at the ER immediately or contact your PCP to discuss your concerns or return here. You must understand that you've received an urgent care treatment only and that you may be released before all your medical problems are known or treated. You the patient will arrange for followup care as instructed.   Tylenol or ibuprofen for pain may help as long as you are not allergic to these meds or have a medical condition such as stomach ulcers, liver or kidney disease or taking blood thinners etc that would   prevent you from using these medications.   Rest and fluids will help as well.   If you were prescribed antibiotics and are female and on BCP use additional methods to prevent pregnancy while on the antibiotics and for one cycle after       Pharyngitis: Strep (Confirmed)    You have had a positive test for strep throat. Strep throat is a contagious illness. It is spread by coughing, kissing or by touching others after touching your mouth or nose. Symptoms include throat pain that is worse with swallowing, aching all over, headache, and fever. It is treated with antibiotic medicine. This should help you start to feel better in 1 to 2 days.  Home care  · Rest at home. Drink plenty of fluids to you won't get dehydrated.  · No work or school for the first 2 days of taking the antibiotics. After this time, you will not be contagious. You can then return to school or work if you are feeling better.   · Take antibiotic medicine for the full 10 days, even if you feel better. This is very important to ensure the infection is treated. It is also important to prevent medicine-resistant germs from developing. If you were given an antibiotic shot, you don't need any more antibiotics.  · You may use acetaminophen or ibuprofen to control pain or fever, unless another medicine  was prescribed for this. Talk with your doctor before taking these medicines if you have chronic liver or kidney disease. Also talk with your doctor if you have had a stomach ulcer or GI bleeding.  · Throat lozenges or sprays help reduce pain. Gargling with warm saltwater will also reduce throat pain. Dissolve 1/2 teaspoon of salt in 1 glass of warm water. This may be useful just before meals.   · Soft foods are OK. Avoid salty or spicy foods.  Follow-up care  Follow up with your healthcare provider or our staff if you don't get better over the next week.  When to seek medical advice  Call your healthcare provider right away if any of these occur:  · Fever of 100.4ºF (38ºC) or higher, or as directed by your healthcare provider  · New or worsening ear pain, sinus pain, or headache  · Painful lumps in the back of neck  · Stiff neck  · Lymph nodes getting larger or becoming soft in the middle  · You can't swallow liquids or you can't open your mouth wide because of throat pain  · Signs of dehydration. These include very dark urine or no urine, sunken eyes, and dizziness.  · Trouble breathing or noisy breathing  · Muffled voice  · Rash  Date Last Reviewed: 4/13/2015  © 4733-5224 [employer]. [address] [place] [address], [place] [address]. All rights reserved. This information is not intended as a substitute for professional medical care. Always follow your healthcare professional's instructions.

## 2017-10-16 NOTE — PROGRESS NOTES
"Subjective:       Patient ID: Umer Rodriguez is a 37 y.o. female.    Vitals:  height is 5' 1" (1.549 m) and weight is 68 kg (150 lb). Her oral temperature is 98.4 °F (36.9 °C). Her blood pressure is 121/78 and her pulse is 85. Her respiration is 16 and oxygen saturation is 98%.     Chief Complaint: Sore Throat    C/o L sided sore throat since earlier today with body aches and fatigue.  Daughter is currently being treated for +strep.  She has not had any recent episodes of strep.  No recent steroids.  Works for herself.  No fever.      Sore Throat    This is a new problem. The current episode started today. The pain is worse on the left side. There has been no fever. The pain is at a severity of 7/10. The pain is moderate. Pertinent negatives include no abdominal pain, congestion, coughing, diarrhea, ear pain, headaches, hoarse voice, shortness of breath, swollen glands, trouble swallowing or vomiting. She has had exposure to strep. She has had no exposure to mono. Treatments tried: benadryl. The treatment provided no relief.   Other   Associated symptoms include a sore throat. Pertinent negatives include no abdominal pain, chest pain, chills, congestion, coughing, fever, headaches, myalgias, nausea, swollen glands or vomiting.     Review of Systems   Constitution: Positive for malaise/fatigue. Negative for chills and fever.   HENT: Positive for sore throat. Negative for congestion, ear pain, hoarse voice and trouble swallowing.    Eyes: Negative for discharge and redness.   Cardiovascular: Negative for chest pain, dyspnea on exertion and leg swelling.   Respiratory: Negative for cough, shortness of breath, sputum production and wheezing.    Musculoskeletal: Negative for myalgias.   Gastrointestinal: Negative for abdominal pain, diarrhea, nausea and vomiting.   Neurological: Negative for headaches.       Objective:      Physical Exam   Constitutional: She is oriented to person, place, and time. Vital signs " are normal. She appears well-developed and well-nourished. She is cooperative.  Non-toxic appearance. She does not have a sickly appearance. She does not appear ill. No distress.   HENT:   Head: Normocephalic and atraumatic.   Right Ear: Hearing, tympanic membrane, external ear and ear canal normal.   Left Ear: Hearing, tympanic membrane, external ear and ear canal normal.   Nose: Nose normal. No mucosal edema, rhinorrhea or nasal deformity. No epistaxis. Right sinus exhibits no maxillary sinus tenderness and no frontal sinus tenderness. Left sinus exhibits no maxillary sinus tenderness and no frontal sinus tenderness.   Mouth/Throat: Uvula is midline and mucous membranes are normal. No trismus in the jaw. Normal dentition. No uvula swelling. Posterior oropharyngeal erythema present. No oropharyngeal exudate, posterior oropharyngeal edema or tonsillar abscesses. Tonsils are 1+ on the right. Tonsils are 2+ on the left. Tonsillar exudate.   Tonsilar exudate to L tonsil.   Eyes: Conjunctivae and lids are normal. No scleral icterus.   Sclera clear bilat   Neck: Trachea normal, full passive range of motion without pain and phonation normal. Neck supple.   Cardiovascular: Normal rate, regular rhythm, normal heart sounds and normal pulses.    Pulmonary/Chest: Effort normal and breath sounds normal. No respiratory distress.   Abdominal: Soft. Normal appearance and bowel sounds are normal. She exhibits no distension. There is no tenderness.   Musculoskeletal: Normal range of motion. She exhibits no edema or deformity.   Lymphadenopathy:        Head (right side): Tonsillar adenopathy present.        Head (left side): Tonsillar adenopathy present.     She has no cervical adenopathy.   Neurological: She is alert and oriented to person, place, and time. She exhibits normal muscle tone. Coordination normal.   Skin: Skin is warm, dry and intact. She is not diaphoretic. No pallor.   Psychiatric: She has a normal mood and affect.  Her speech is normal and behavior is normal. Judgment and thought content normal. Cognition and memory are normal.   Nursing note and vitals reviewed.      Results for orders placed or performed in visit on 10/16/17   POCT rapid strep A   Result Value Ref Range    Rapid Strep A Screen Positive (A) Negative     Acceptable Yes      Assessment:       1. Strep pharyngitis        Plan:         Strep pharyngitis  -     POCT rapid strep A  -     dexamethasone injection 7 mg; Inject 0.7 mLs (7 mg total) into the muscle once.  -     amoxicillin-clavulanate 875-125mg (AUGMENTIN) 875-125 mg per tablet; Take 1 tablet by mouth 2 (two) times daily.  Dispense: 20 tablet; Refill: 0  -     (Magic mouthwash) 1:1:1 Benadryl 12.5mg/5ml liq, aluminum & magnesium hydroxide-simehticone (Maalox), lidocaine viscous 2%; Swish and spit 10 mLs every 4 (four) hours as needed (pain). For mouth pain.  Dispense: 450 mL; Refill: 0

## 2017-12-08 NOTE — PROGRESS NOTES
INPATIENT ROUNDING NOTE    Postoperative day: 1    Overnight events:     Patient has done well overnight.    no nausea  She reports that her pain is minimal at present. She did not take narcotic overnight. She had 50microgm fentanyl, followed by [id number] 20 min later, followed by 0.5 mg dilaudid documented in the hour prior to coming to the floor, all given in the recovery room.  The nurse on the floor called with concerns about her blood pressure of 114/44, and some drowsiness around 6:30pm.  Stated that patient had been drowsy but arousable since arrival at the floor around 3:30. Both she and the charge nurse evaluated patient-   her blood pressure stayed in the 160s over 60s and patient conversant and lucid, doing fine.  I did discuss the case with anesthesia on call, in the case that she were to need narcan reversal of narcotic. Fortunately, she did not need this.    We discussed using tylenol and minimizing narcotics over night.     She has voided since her catheter has been removed.  She has ambulated.    Exam:  Temp:  [96.6 °F (35.9 °C)-98.3 °F (36.8 °C)] 96.6 °F (35.9 °C)  Heart Rate:  [50-71] 50  Resp:  [14-16] 16  BP: (114-160)/(44-69) 132/62     UOP- 2.4L  ELIAN 65    On examination, her incisions are dressed and dressings clean dry and intact.  Her mastectomy flaps are well perfused with minimal ecchymoses.   There are no undrained fluid collections.      Assessment and plan:    Breast cancer, postoperative day 1.  Doing well.  HLIV.  Likely home today after drain teaching and breakfast and/or lunch.    She has an appointment with me in the office already scheduled for her postop visit.               Subjective:       Patient ID: Umer Rodriguez is a 36 y.o. female.    Chief Complaint: Pre-op Exam (surgery to right foot)    HPI:  Here for pre-op clearance for debulking of right 5th toe due to hypertrophic scar.  Patient reports flare up of allergies.  Also with rash on finger and dry itching skin on left elbow.  Reports non-painful palpable mass in the right breast x 2 weeks.  No change in size.    Review of Systems   Constitutional: Positive for fatigue. Negative for fever.   HENT: Positive for rhinorrhea and sneezing.    Respiratory: Positive for cough.    Cardiovascular: Negative.  Negative for chest pain.   Skin: Positive for rash.       Objective:      Physical Exam   Constitutional: She is oriented to person, place, and time. She appears well-developed and well-nourished.   Eyes: No scleral icterus.   Neck: Normal range of motion. Neck supple. No thyromegaly present.   Cardiovascular: Normal rate and regular rhythm.  Exam reveals no gallop and no friction rub.    No murmur heard.  Pulmonary/Chest: Effort normal and breath sounds normal. She has no wheezes. She has no rales. Right breast exhibits mass.       Firm mass in the upper outer quadrant right breast   Abdominal: Soft. Bowel sounds are normal. She exhibits no distension and no mass. There is no hepatosplenomegaly. There is no tenderness.   Musculoskeletal: She exhibits no edema.   Lymphadenopathy:     She has no cervical adenopathy.     She has no axillary adenopathy.        Right: No supraclavicular adenopathy present.        Left: No supraclavicular adenopathy present.   Neurological: She is alert and oriented to person, place, and time.   Skin: Skin is warm and dry. No rash noted.   Hypertrophy of scar tissue right fifth toe  Dry scaly skin distal right index finger   Psychiatric: She has a normal mood and affect. Her behavior is normal.         Assessment:       1. Hypertrophic scar    2. History of foot surgery    3. Eczema, unspecified  type    4. Seasonal allergic rhinitis due to pollen    5. Breast mass in female        Plan:       Hypertrophic scar  Patient medically cleared for surgery    History of foot surgery    Eczema, unspecified type  -     triamcinolone acetonide 0.1% (KENALOG) 0.1 % cream; Apply topically 3 (three) times daily.  Dispense: 45 Tube; Refill: 2    Seasonal allergic rhinitis due to pollen  -     fluticasone (FLONASE) 50 mcg/actuation nasal spray; 1 spray by Each Nare route once daily.  Dispense: 1 Bottle; Refill: 11    Breast mass, right  Diagnostic mammogram          No Follow-up on file.

## 2018-01-09 ENCOUNTER — OFFICE VISIT (OUTPATIENT)
Dept: FAMILY MEDICINE | Facility: CLINIC | Age: 38
End: 2018-01-09
Payer: MEDICAID

## 2018-01-09 VITALS
HEIGHT: 61 IN | BODY MASS INDEX: 29.81 KG/M2 | TEMPERATURE: 99 F | WEIGHT: 157.88 LBS | RESPIRATION RATE: 16 BRPM | DIASTOLIC BLOOD PRESSURE: 80 MMHG | HEART RATE: 66 BPM | OXYGEN SATURATION: 98 % | SYSTOLIC BLOOD PRESSURE: 116 MMHG

## 2018-01-09 DIAGNOSIS — F43.9 STRESS AT HOME: Primary | ICD-10-CM

## 2018-01-09 DIAGNOSIS — L81.4 DARKENING OF SKIN: ICD-10-CM

## 2018-01-09 DIAGNOSIS — Z12.9 CANCER SCREENING: ICD-10-CM

## 2018-01-09 PROCEDURE — 99214 OFFICE O/P EST MOD 30 MIN: CPT | Mod: PBBFAC,PO | Performed by: PHYSICIAN ASSISTANT

## 2018-01-09 PROCEDURE — 99999 PR PBB SHADOW E&M-EST. PATIENT-LVL IV: CPT | Mod: PBBFAC,,, | Performed by: PHYSICIAN ASSISTANT
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
## 2018-01-15 ENCOUNTER — TELEPHONE (OUTPATIENT)
Dept: ADMINISTRATIVE | Facility: HOSPITAL | Age: 38
End: 2018-01-15

## 2018-01-15 NOTE — TELEPHONE ENCOUNTER
Patient has been given the phone number to Dr Braxton's office. She will contact them for her Dermatology appt.    Patient has been scheduled with OB/GYN, Dr Worthington 1/25 @ 1:10pm

## 2018-02-07 ENCOUNTER — PATIENT MESSAGE (OUTPATIENT)
Dept: FAMILY MEDICINE | Facility: CLINIC | Age: 38
End: 2018-02-07

## 2018-02-14 RX ORDER — TRAZODONE HYDROCHLORIDE 50 MG/1
50 TABLET ORAL NIGHTLY
Qty: 30 TABLET | Refills: 0 | Status: SHIPPED | OUTPATIENT
Start: 2018-02-14 | End: 2019-02-07 | Stop reason: CLARIF

## 2018-07-03 ENCOUNTER — OFFICE VISIT (OUTPATIENT)
Dept: FAMILY MEDICINE | Facility: CLINIC | Age: 38
End: 2018-07-03
Payer: MEDICAID

## 2018-07-03 VITALS
HEART RATE: 65 BPM | BODY MASS INDEX: 33.59 KG/M2 | DIASTOLIC BLOOD PRESSURE: 64 MMHG | SYSTOLIC BLOOD PRESSURE: 102 MMHG | WEIGHT: 177.94 LBS | TEMPERATURE: 98 F | HEIGHT: 61 IN | OXYGEN SATURATION: 97 % | RESPIRATION RATE: 16 BRPM

## 2018-07-03 DIAGNOSIS — J01.90 ACUTE BACTERIAL SINUSITIS: Primary | ICD-10-CM

## 2018-07-03 DIAGNOSIS — R32 URINARY INCONTINENCE, UNSPECIFIED TYPE: ICD-10-CM

## 2018-07-03 DIAGNOSIS — B96.89 ACUTE BACTERIAL SINUSITIS: Primary | ICD-10-CM

## 2018-07-03 PROCEDURE — 99999 PR PBB SHADOW E&M-EST. PATIENT-LVL IV: CPT | Mod: PBBFAC,,, | Performed by: PHYSICIAN ASSISTANT

## 2018-07-03 PROCEDURE — 99214 OFFICE O/P EST MOD 30 MIN: CPT | Mod: S$PBB,,, | Performed by: PHYSICIAN ASSISTANT

## 2018-07-03 PROCEDURE — 99214 OFFICE O/P EST MOD 30 MIN: CPT | Mod: PBBFAC,PO | Performed by: PHYSICIAN ASSISTANT

## 2018-07-03 RX ORDER — AMOXICILLIN AND CLAVULANATE POTASSIUM 875; 125 MG/1; MG/1
1 TABLET, FILM COATED ORAL 2 TIMES DAILY
Qty: 10 TABLET | Refills: 0 | Status: SHIPPED | OUTPATIENT
Start: 2018-07-03 | End: 2018-07-13
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
## 2018-07-10 ENCOUNTER — PATIENT MESSAGE (OUTPATIENT)
Dept: FAMILY MEDICINE | Facility: CLINIC | Age: 38
End: 2018-07-10

## 2018-08-07 ENCOUNTER — OFFICE VISIT (OUTPATIENT)
Dept: UROLOGY | Facility: CLINIC | Age: 38
End: 2018-08-07
Payer: MEDICAID

## 2018-08-07 VITALS
SYSTOLIC BLOOD PRESSURE: 110 MMHG | HEART RATE: 73 BPM | DIASTOLIC BLOOD PRESSURE: 79 MMHG | WEIGHT: 170.63 LBS | BODY MASS INDEX: 32.22 KG/M2 | HEIGHT: 61 IN

## 2018-08-07 DIAGNOSIS — Q05.7 SPINA BIFIDA OF LUMBOSACRAL REGION WITHOUT HYDROCEPHALUS: ICD-10-CM

## 2018-08-07 DIAGNOSIS — Q06.8 TETHERED SPINAL CORD: ICD-10-CM

## 2018-08-07 DIAGNOSIS — N39.3 SUI (STRESS URINARY INCONTINENCE, FEMALE): ICD-10-CM

## 2018-08-07 PROCEDURE — 99204 OFFICE O/P NEW MOD 45 MIN: CPT | Mod: S$PBB,,, | Performed by: UROLOGY

## 2018-08-07 PROCEDURE — 99999 PR PBB SHADOW E&M-EST. PATIENT-LVL III: CPT | Mod: PBBFAC,,, | Performed by: UROLOGY

## 2018-08-07 PROCEDURE — 99213 OFFICE O/P EST LOW 20 MIN: CPT | Mod: PBBFAC,25 | Performed by: UROLOGY

## 2018-08-07 PROCEDURE — 51798 US URINE CAPACITY MEASURE: CPT | Mod: PBBFAC | Performed by: UROLOGY

## 2018-08-07 NOTE — LETTER
August 7, 2018      Karina Bryson PA-C  [address]  [place] [address]           [facility] - Urology 4th Floor  [address]  [place] [address]  Phone: [phone]          Patient: Umer Rodriguez   MR Number: [id number]   YOB: 1980   Date of Visit: 8/7/2018       Dear Karina Bryson:    Thank you for referring Umer Rodriguez to me for evaluation. Attached you will find relevant portions of my assessment and plan of care.    If you have questions, please do not hesitate to call me. I look forward to following Umer Rodriguez along with you.    Sincerely,    Pollo Park MD    Enclosure  CC:  No Recipients    If you would like to receive this communication electronically, please contact [email or web address] or [phone] to request more information on [employer] Link access.    For providers and/or their staff who would like to refer a patient to [facility], please contact us through our one-stop-shop provider referral line, [facility], at [phone].    If you feel you have received this communication in error or would no longer like to receive these types of communications, please e-mail [email or web address]
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
## 2018-08-21 ENCOUNTER — PROCEDURE VISIT (OUTPATIENT)
Dept: UROLOGY | Facility: CLINIC | Age: 38
End: 2018-08-21
Payer: MEDICAID

## 2018-08-21 VITALS
HEIGHT: 62 IN | TEMPERATURE: 96 F | WEIGHT: 177.94 LBS | SYSTOLIC BLOOD PRESSURE: 137 MMHG | BODY MASS INDEX: 32.74 KG/M2 | HEART RATE: 65 BPM | DIASTOLIC BLOOD PRESSURE: 68 MMHG | RESPIRATION RATE: 18 BRPM

## 2018-08-21 DIAGNOSIS — N39.3 SUI (STRESS URINARY INCONTINENCE, FEMALE): ICD-10-CM

## 2018-08-21 DIAGNOSIS — Q05.7 SPINA BIFIDA OF LUMBOSACRAL REGION WITHOUT HYDROCEPHALUS: ICD-10-CM

## 2018-08-21 DIAGNOSIS — N32.89 DECREASED BLADDER CAPACITY: ICD-10-CM

## 2018-08-21 DIAGNOSIS — N32.81 DETRUSOR INSTABILITY: ICD-10-CM

## 2018-08-21 PROCEDURE — 52281 CYSTOSCOPY AND TREATMENT: CPT | Mod: PBBFAC | Performed by: UROLOGY

## 2018-08-21 PROCEDURE — 87086 URINE CULTURE/COLONY COUNT: CPT

## 2018-08-21 PROCEDURE — 51741 ELECTRO-UROFLOWMETRY FIRST: CPT | Mod: 26,51,S$PBB, | Performed by: UROLOGY

## 2018-08-21 PROCEDURE — 51741 ELECTRO-UROFLOWMETRY FIRST: CPT | Mod: PBBFAC | Performed by: UROLOGY

## 2018-08-21 PROCEDURE — 52000 CYSTOURETHROSCOPY: CPT | Mod: PBBFAC | Performed by: UROLOGY

## 2018-08-21 PROCEDURE — 51784 ANAL/URINARY MUSCLE STUDY: CPT | Mod: PBBFAC | Performed by: UROLOGY

## 2018-08-21 PROCEDURE — 51728 CYSTOMETROGRAM W/VP: CPT | Mod: PBBFAC | Performed by: UROLOGY

## 2018-08-21 PROCEDURE — 51701 INSERT BLADDER CATHETER: CPT | Mod: PBBFAC | Performed by: UROLOGY

## 2018-08-21 PROCEDURE — 51728 CYSTOMETROGRAM W/VP: CPT | Mod: 26,S$PBB,, | Performed by: UROLOGY

## 2018-08-21 PROCEDURE — 52000 CYSTOURETHROSCOPY: CPT | Mod: S$PBB,59,, | Performed by: UROLOGY

## 2018-08-21 PROCEDURE — 51784 ANAL/URINARY MUSCLE STUDY: CPT | Mod: 26,51,S$PBB, | Performed by: UROLOGY

## 2018-08-21 PROCEDURE — 51797 INTRAABDOMINAL PRESSURE TEST: CPT | Mod: 26,S$PBB,, | Performed by: UROLOGY

## 2018-08-21 PROCEDURE — 51797 INTRAABDOMINAL PRESSURE TEST: CPT | Mod: PBBFAC | Performed by: UROLOGY

## 2018-08-21 RX ORDER — LIDOCAINE HYDROCHLORIDE 20 MG/ML
JELLY TOPICAL
Status: COMPLETED | OUTPATIENT
Start: 2018-08-21 | End: 2018-08-21

## 2018-08-21 RX ORDER — CIPROFLOXACIN 500 MG/1
500 TABLET ORAL ONCE
Status: COMPLETED | OUTPATIENT
Start: 2018-08-21 | End: 2018-08-21

## 2018-08-21 RX ORDER — OXYBUTYNIN CHLORIDE 10 MG/1
10 TABLET, EXTENDED RELEASE ORAL DAILY
Qty: 30 TABLET | Refills: 11 | Status: SHIPPED | OUTPATIENT
Start: 2018-08-21 | End: 2019-01-25

## 2018-08-21 RX ORDER — IMIPRAMINE HYDROCHLORIDE 25 MG/1
25 TABLET, FILM COATED ORAL 3 TIMES DAILY
Qty: 90 TABLET | Refills: 11 | Status: SHIPPED | OUTPATIENT
Start: 2018-08-21 | End: 2019-01-25

## 2018-08-21 RX ADMIN — CIPROFLOXACIN 500 MG: 500 TABLET ORAL at 02:08

## 2018-08-21 RX ADMIN — LIDOCAINE HYDROCHLORIDE: 20 JELLY TOPICAL at 02:08

## 2018-08-21 NOTE — PATIENT INSTRUCTIONS
SIMPLE URODYNAMIC STUDY (SUDS) & CYSTOSCOPY  UROLOGY CLINIC DISCHARGE INSTRUCTIONS    You have had a procedure that will require time to properly heal. Follow the instructions you have been given on how to care for yourself once you are home. Below is additional information to help in your recovery.    ACTIVITY  · There are no restrictions in activity. Start doing again the things you did before the procedure.  · You may experience a slight burning sensation. You may notice a small amount of blood in your urine. This will clear up within a day. Call the clinic if this continues beyond 48 hours.    DIET  · Continue your normal diet. You may eat the same foods you ate before your procedure.  · Drink plenty of fluids during the first 24-48 hours following your procedure.    MEDICATIONS  · Resume all other previous medications from your prescribing physician.  · Continue any pre=procedure antibiotics until they are all gone.    SIGNS AND SYMPTOMS TO REPORT TO THE DOCTOR  · Chills or fever greater than 101° F within 24 hours of procedure.  · Changes in urination, such as increased bleeding, foul smell, cloudy urine, or painful urination.  · Call your doctor with any questions or concerns.    For any emergency situation, call [phone] immediately or go to your nearest emergency room.    [facility]  [phone]  After hours or on the weekends call [phone] and ask to speak with an urology resident on-call with any questions or concerns

## 2018-08-22 ENCOUNTER — PATIENT MESSAGE (OUTPATIENT)
Dept: UROLOGY | Facility: CLINIC | Age: 38
End: 2018-08-22

## 2018-08-22 LAB
BACTERIA UR CULT: NORMAL
BACTERIA UR CULT: NORMAL

## 2018-08-22 NOTE — PROCEDURES
Simple Urodynamics w/ Cysto  Date/Time: 8/21/2018 9:03 PM  Performed by: Pollo Park MD  Authorized by: Pollo Park MD   Comments: Procedure Date:  08/21/2018    Procedure:   Diagnostic Cystourethroscopy   Complex Cystometrogram   Voiding / Pressure Study with Intrarectal Balloon   Complex Uroflow   Electromyogram of Anal Sphincter.     Pre-OP Diagnosis:   1. LENA  2. Spinal bifida   Post-OP Diagnosis:   Same, 3. decreased bladder capacity with detrusor instabiltiy  4. Meatal stensosis    Anesthesia:   Anesthesia Administered:   Intraurethral instillation of 10 mL 2% lidocaine (Xylocaine) jelly.   Findings:   --- Bladder ---   CYSTOMETROGRAM ( Filling Phase ):   Urine leak with detrusor instability at 132 ml bladder filling with Pabd 16 cm water pressure.    Cystometric Numeric Data:   - First Desire (Sensation): 177 mL at 9 cm of water.   - Normal Desire: 203mL at 19 cm of water.   - Strong Desire: 203 mL at 44 cm of water and urine leak.     - Maximum Cystometric Capacity: 203 mL.   Compliance:   - low.   Leak Point Pressure:   - Valsalva ( Abdominal ) Leak Point Pressure: Pabd less than 30 cm water pressure.   UROFLOW:   - Voided Volume: 48 mL.   - Residual Urine: 24 mL.   - Maximum Flow Rate: 20 mL/sec.   - Flow Pattern: normal  VOIDING PRESSURE STUDY ( Voiding Phase ):   Detrusor Pressure:   - Maximum Detrusor Pressure: 72 cm of water.   - Detrusor Pressure at Maximum Flow: 39 cm of water.   - Detrusor Contraction Characteristics: Sustained contraction(s).   ELECTROMYOGRAM:   - normal.     ---Diagnostic Cystourethroscopy ---   Meatal stenosis, requiring urethral dilation up to 20 F in size.      hypermobile at the bladder neck  Width of Bladder Neck Opening: Approximately 18 Fr.   Normal bladder.   Normal ureteral orifices bilaterally.       Description of Procedure:   Informed Consent:   - Risks, benefits and alternatives of procedure discussed with   patient and informed consent obtained.   Patient  Position:   - Supine.   --- Bladder ---   Prep and Drape:   - Patient prepped and draped in usual sterile fashion using povidone   iodine (Betadine).   --- Diagnostic Cystourethroscopy ---   Instruments:   - 16 Fr flexible cystoscope with 0 degree lens.   Procedure Details:   - Cystoscope passed under vision into bladder.   - Bladder and urethra examined in their entirety with findings as   above.   --- Urodynamic Studies ---   Procedure Details:   Cystometrogram:   - Catheter(s) passed into the bladder.   - Rectal balloon inserted.   - Catheter(s) connected to infusion medium and to pressure recording   device.   - Infusion Rate: 30 mL / min ( later slowed down to 10 ml per min).   Electromyogram:   - Perineal electromyogram pad placed and connected to electromyogram   recording device.   Equipment:   - Catheters: Double lumen catheter.   - Medium: Liquid.   - Pressure Recording Device: Calibrated electronic equipment.   Complications:   No immediate complications.ui    CONCLUSIONS:   1. Small bladder capacity less than 200 ml with detrusor instability with urine leak less than 100 ml bladder filling  2. LENA  3. Meatal stenosis.    Kegel exercise.  Consider cysto botox injection ( minimal 200 units injection)  May consider InterStim Therapy ( first get KUB and sacral x-ray)  Once she improves on her bladder capacity, I think we can do mid-urethral sling to treat her LENA.    Alternatively, she can do sling first but I feel that it may worsen her OAB and bladder instability.  Nature of her urologic problems explained in detail.  She will think it over.  May start imipramine combined with oxybutynin for medical therapy.         Spina bifida of lumbosacral region without hydrocephalus  Simple Urodynamics w/ Cysto  X-Ray Abdomen AP 1 View; Future; Expected date: 08/21/2018  X-Ray Sacrum And Coccyx; Future; Expected date: 08/21/2018     LENA (stress urinary incontinence, female)  Simple Urodynamics w/ Cysto  lidocaine  HCl 2% urojet; by Mucous Membrane route one time.  ciprofloxacin HCl tablet 500 mg; Take 1 tablet (500 mg total) by mouth once.  Urine culture  imipramine (TOFRANIL) 25 MG tablet; Take 1 tablet (25 mg total) by mouth 3 (three) times daily.  Dispense: 90 tablet; Refill: 11     Decreased bladder capacity  oxybutynin (DITROPAN-XL) 10 MG 24 hr tablet; Take 1 tablet (10 mg total) by mouth once daily.  Dispense: 30 tablet; Refill: 11     Detrusor instability  oxybutynin (DITROPAN-XL) 10 MG 24 hr tablet; Take 1 tablet (10 mg total) by mouth once daily.  Dispense: 30 tablet; Refill: 11      Post-OP Plan:   Patient was discharged home in a stable condition.  Medications: cipro  Follow up:  Cysto and botox injection

## 2018-10-12 ENCOUNTER — PATIENT MESSAGE (OUTPATIENT)
Dept: UROLOGY | Facility: CLINIC | Age: 38
End: 2018-10-12

## 2018-10-23 ENCOUNTER — TELEPHONE (OUTPATIENT)
Dept: FAMILY MEDICINE | Facility: CLINIC | Age: 38
End: 2018-10-23

## 2018-10-24 ENCOUNTER — OFFICE VISIT (OUTPATIENT)
Dept: FAMILY MEDICINE | Facility: CLINIC | Age: 38
End: 2018-10-24
Payer: MEDICAID

## 2018-10-24 VITALS
HEART RATE: 85 BPM | DIASTOLIC BLOOD PRESSURE: 78 MMHG | HEIGHT: 62 IN | WEIGHT: 173.31 LBS | SYSTOLIC BLOOD PRESSURE: 124 MMHG | BODY MASS INDEX: 31.89 KG/M2 | OXYGEN SATURATION: 97 % | TEMPERATURE: 98 F | RESPIRATION RATE: 16 BRPM

## 2018-10-24 DIAGNOSIS — R13.10 DYSPHAGIA, UNSPECIFIED TYPE: ICD-10-CM

## 2018-10-24 DIAGNOSIS — Z30.41 ENCOUNTER FOR SURVEILLANCE OF CONTRACEPTIVE PILLS: ICD-10-CM

## 2018-10-24 DIAGNOSIS — Z23 NEED FOR INFLUENZA VACCINATION: ICD-10-CM

## 2018-10-24 DIAGNOSIS — S81.811A LACERATION OF RIGHT LOWER EXTREMITY, INITIAL ENCOUNTER: ICD-10-CM

## 2018-10-24 DIAGNOSIS — Z12.4 CERVICAL CANCER SCREENING: Primary | ICD-10-CM

## 2018-10-24 DIAGNOSIS — Z23 NEED FOR TETANUS, DIPHTHERIA, AND ACELLULAR PERTUSSIS (TDAP) VACCINE: ICD-10-CM

## 2018-10-24 PROCEDURE — 90715 TDAP VACCINE 7 YRS/> IM: CPT | Mod: PBBFAC,PO

## 2018-10-24 PROCEDURE — 90686 IIV4 VACC NO PRSV 0.5 ML IM: CPT | Mod: PBBFAC,PO

## 2018-10-24 PROCEDURE — 87491 CHLMYD TRACH DNA AMP PROBE: CPT

## 2018-10-24 PROCEDURE — 87660 TRICHOMONAS VAGIN DIR PROBE: CPT

## 2018-10-24 PROCEDURE — 90472 IMMUNIZATION ADMIN EACH ADD: CPT | Mod: PBBFAC,PO

## 2018-10-24 PROCEDURE — 88142 CYTOPATH C/V THIN LAYER: CPT

## 2018-10-24 PROCEDURE — 87624 HPV HI-RISK TYP POOLED RSLT: CPT

## 2018-10-24 PROCEDURE — 99999 PR PBB SHADOW E&M-EST. PATIENT-LVL IV: CPT | Mod: PBBFAC,,, | Performed by: PHYSICIAN ASSISTANT

## 2018-10-24 PROCEDURE — 99214 OFFICE O/P EST MOD 30 MIN: CPT | Mod: PBBFAC,PO | Performed by: PHYSICIAN ASSISTANT

## 2018-10-24 PROCEDURE — 99395 PREV VISIT EST AGE 18-39: CPT | Mod: S$PBB,,, | Performed by: PHYSICIAN ASSISTANT

## 2018-10-24 RX ORDER — LEVONORGESTREL / ETHINYL ESTRADIOL AND ETHINYL ESTRADIOL 150-30(84)
1 KIT ORAL DAILY
Qty: 90 EACH | Refills: 2 | Status: SHIPPED | OUTPATIENT
Start: 2018-10-24 | End: 2019-08-01 | Stop reason: SDUPTHER

## 2018-10-24 NOTE — PROGRESS NOTES
Tdap and flu vaccines administered to right deltoid. VIS forms given. Tolerated well. No adverse reaction noted.

## 2018-10-25 LAB
C TRACH DNA SPEC QL NAA+PROBE: NOT DETECTED
CANDIDA RRNA VAG QL PROBE: NEGATIVE
G VAGINALIS RRNA GENITAL QL PROBE: NEGATIVE
N GONORRHOEA DNA SPEC QL NAA+PROBE: NOT DETECTED
T VAGINALIS RRNA GENITAL QL PROBE: NEGATIVE

## 2018-10-25 NOTE — PROGRESS NOTES
"Subjective:       Patient ID: Umer Rodriguez is a 38 y.o. female.    Chief Complaint: Gynecologic Exam; Medication Refill (birth control); and Immunizations (Tetanus shot for nail goint right leg)    HPI" 39yo female presents for GYN exam. No history of abnormal paps. No vaginal discharge, abnormal bleeding. She is on oral OCPs. Scraped her leg few days ago with nail. Needs tetanus. States she sometimes feels like something is caught in her throat when swallowing. She took allegra with some relief. No history of thyroid problems.     Review of Systems   Genitourinary: Negative for vaginal bleeding and vaginal discharge.   Skin:        Cut right leg       Objective:      Physical Exam   Constitutional: She is oriented to person, place, and time. She appears well-developed and well-nourished.   Pulmonary/Chest: Right breast exhibits no inverted nipple, no mass and no nipple discharge. Left breast exhibits no inverted nipple, no mass and no nipple discharge.   Genitourinary: Vagina normal. There is no rash or tenderness on the right labia. There is no rash or tenderness on the left labia. Cervix exhibits no motion tenderness. No tenderness in the vagina. No signs of injury around the vagina. No vaginal discharge found.   Neurological: She is alert and oriented to person, place, and time.   Skin: Skin is warm and dry.   Vitals reviewed.      Assessment:       1. Cervical cancer screening    2. Encounter for surveillance of contraceptive pills    3. Need for influenza vaccination    4. Dysphagia, unspecified type    5. Laceration of right lower extremity, initial encounter    6. Need for tetanus, diphtheria, and acellular pertussis (Tdap) vaccine        Plan:         Umer was seen today for gynecologic exam, medication refill and immunizations.    Diagnoses and all orders for this visit:    Cervical cancer screening  -     C. trachomatis/N. gonorrhoeae by AMP DNA [facility]; Vagina  -     Liquid-based pap smear, " screening  -     Vaginosis Screen by DNA Probe  -     HPV High Risk Genotypes, PCR    Encounter for surveillance of contraceptive pills  -     L norgest/e.estradiol-e.estrad (SEASONIQUE) 0.15 mg-30 mcg (84)/10 mcg (7) 3MPk; Take 1 tablet by mouth once daily.    Need for influenza vaccination  -     Influenza - Quadrivalent (3 years & older) (PF)    Dysphagia, unspecified type  -     TSH; Future  -     T4, free; Future    Laceration of right lower extremity, initial encounter  -     (In Office Administered) Tdap Vaccine    Need for tetanus, diphtheria, and acellular pertussis (Tdap) vaccine

## 2018-10-29 LAB
HPV HR 12 DNA CVX QL NAA+PROBE: NEGATIVE
HPV16 AG SPEC QL: NEGATIVE
HPV18 DNA SPEC QL NAA+PROBE: NEGATIVE

## 2018-10-30 ENCOUNTER — LAB VISIT (OUTPATIENT)
Dept: LAB | Facility: HOSPITAL | Age: 38
End: 2018-10-30
Attending: FAMILY MEDICINE
Payer: MEDICAID

## 2018-10-30 DIAGNOSIS — R13.10 DYSPHAGIA, UNSPECIFIED TYPE: ICD-10-CM

## 2018-10-30 LAB
T4 FREE SERPL-MCNC: 1.14 NG/DL
TSH SERPL DL<=0.005 MIU/L-ACNC: 1.53 UIU/ML

## 2018-10-30 PROCEDURE — 84443 ASSAY THYROID STIM HORMONE: CPT

## 2018-10-30 PROCEDURE — 84439 ASSAY OF FREE THYROXINE: CPT

## 2018-10-30 PROCEDURE — 36415 COLL VENOUS BLD VENIPUNCTURE: CPT | Mod: PO

## 2018-12-08 ENCOUNTER — PATIENT MESSAGE (OUTPATIENT)
Dept: UROLOGY | Facility: CLINIC | Age: 38
End: 2018-12-08

## 2019-01-15 ENCOUNTER — OFFICE VISIT (OUTPATIENT)
Dept: FAMILY MEDICINE | Facility: CLINIC | Age: 39
End: 2019-01-15
Payer: MEDICAID

## 2019-01-15 VITALS
SYSTOLIC BLOOD PRESSURE: 116 MMHG | BODY MASS INDEX: 31.52 KG/M2 | HEIGHT: 62 IN | OXYGEN SATURATION: 99 % | DIASTOLIC BLOOD PRESSURE: 72 MMHG | TEMPERATURE: 99 F | HEART RATE: 60 BPM | WEIGHT: 171.31 LBS | RESPIRATION RATE: 16 BRPM

## 2019-01-15 DIAGNOSIS — R51.9 ACUTE NONINTRACTABLE HEADACHE, UNSPECIFIED HEADACHE TYPE: Primary | ICD-10-CM

## 2019-01-15 DIAGNOSIS — R42 DIZZINESS: ICD-10-CM

## 2019-01-15 PROCEDURE — 99214 OFFICE O/P EST MOD 30 MIN: CPT | Mod: PBBFAC,PO | Performed by: PHYSICIAN ASSISTANT

## 2019-01-15 PROCEDURE — 99213 OFFICE O/P EST LOW 20 MIN: CPT | Mod: S$PBB,,, | Performed by: PHYSICIAN ASSISTANT

## 2019-01-15 PROCEDURE — 99213 PR OFFICE/OUTPT VISIT, EST, LEVL III, 20-29 MIN: ICD-10-PCS | Mod: S$PBB,,, | Performed by: PHYSICIAN ASSISTANT

## 2019-01-15 PROCEDURE — 99999 PR PBB SHADOW E&M-EST. PATIENT-LVL IV: ICD-10-PCS | Mod: PBBFAC,,, | Performed by: PHYSICIAN ASSISTANT

## 2019-01-15 PROCEDURE — 99999 PR PBB SHADOW E&M-EST. PATIENT-LVL IV: CPT | Mod: PBBFAC,,, | Performed by: PHYSICIAN ASSISTANT

## 2019-01-15 RX ORDER — KETOROLAC TROMETHAMINE 30 MG/ML
30 INJECTION, SOLUTION INTRAMUSCULAR; INTRAVENOUS ONCE
Status: COMPLETED | OUTPATIENT
Start: 2019-01-15 | End: 2019-01-15

## 2019-01-15 RX ADMIN — KETOROLAC TROMETHAMINE 30 MG: 30 INJECTION, SOLUTION INTRAMUSCULAR; INTRAVENOUS at 02:01

## 2019-01-15 NOTE — PROGRESS NOTES
Subjective:       Patient ID: Umer Rodriguez is a 38 y.o. female.    Chief Complaint: Dizziness (headache level 7 pain)    Headache    This is a recurrent problem. The current episode started today. The problem occurs constantly. The problem has been gradually improving. The pain is located in the frontal region. The pain does not radiate. The pain quality is not similar to prior headaches. The quality of the pain is described as pulsating. The pain is at a severity of 7/10. Associated symptoms include dizziness. Pertinent negatives include no blurred vision, coughing, fever, nausea, phonophobia, photophobia, scalp tenderness, visual change or vomiting. Nothing aggravates the symptoms. She has tried NSAIDs for the symptoms. The treatment provided mild relief. There is no history of migraine headaches, migraines in the family or recent head traumas.      Review of Systems   Constitutional: Negative for fever.   HENT: Positive for congestion.    Eyes: Negative for blurred vision and photophobia.   Respiratory: Negative for cough.    Gastrointestinal: Negative for nausea and vomiting.   Neurological: Positive for dizziness and headaches.       Objective:      Physical Exam   Constitutional: She is oriented to person, place, and time. She appears well-developed and well-nourished.   HENT:   Right Ear: Tympanic membrane and ear canal normal.   Left Ear: Tympanic membrane and ear canal normal.   Nose: Mucosal edema present.   Mouth/Throat: No oropharyngeal exudate or posterior oropharyngeal edema.   Eyes: EOM are normal. Pupils are equal, round, and reactive to light.   Neurological: She is alert and oriented to person, place, and time.   Tongue midline, facial expressions equal bilaterally, normal rapid hand movements, normal heel to shin, normal finger to nose       Skin: Skin is warm and dry.   Psychiatric: She has a normal mood and affect. Her behavior is normal.   Vitals reviewed.      Assessment:       1.  Acute nonintractable headache, unspecified headache type    2. Dizziness        Plan:         Umer was seen today for dizziness.    Diagnoses and all orders for this visit:    Acute nonintractable headache, unspecified headache type  -     ketorolac injection 30 mg  -     Rest, push fluids, nsaids PRN    Dizziness  -   Monitor, call if returns again

## 2019-01-25 ENCOUNTER — TELEPHONE (OUTPATIENT)
Dept: UROLOGY | Facility: CLINIC | Age: 39
End: 2019-01-25

## 2019-01-25 ENCOUNTER — OFFICE VISIT (OUTPATIENT)
Dept: UROLOGY | Facility: CLINIC | Age: 39
End: 2019-01-25
Payer: MEDICAID

## 2019-01-25 VITALS
BODY MASS INDEX: 32.34 KG/M2 | DIASTOLIC BLOOD PRESSURE: 68 MMHG | HEART RATE: 66 BPM | HEIGHT: 61 IN | WEIGHT: 171.31 LBS | SYSTOLIC BLOOD PRESSURE: 99 MMHG

## 2019-01-25 DIAGNOSIS — N39.46 MIXED INCONTINENCE: ICD-10-CM

## 2019-01-25 DIAGNOSIS — N32.89 DECREASED BLADDER CAPACITY: Primary | ICD-10-CM

## 2019-01-25 DIAGNOSIS — N32.81 OAB (OVERACTIVE BLADDER): Primary | ICD-10-CM

## 2019-01-25 DIAGNOSIS — N32.81 DETRUSOR INSTABILITY: ICD-10-CM

## 2019-01-25 DIAGNOSIS — N39.3 SUI (STRESS URINARY INCONTINENCE, FEMALE): ICD-10-CM

## 2019-01-25 DIAGNOSIS — Q05.7 SPINA BIFIDA OF LUMBOSACRAL REGION WITHOUT HYDROCEPHALUS: ICD-10-CM

## 2019-01-25 DIAGNOSIS — N39.41 URGE INCONTINENCE: ICD-10-CM

## 2019-01-25 PROCEDURE — 99213 OFFICE O/P EST LOW 20 MIN: CPT | Mod: PBBFAC | Performed by: UROLOGY

## 2019-01-25 PROCEDURE — 99999 PR PBB SHADOW E&M-EST. PATIENT-LVL III: ICD-10-PCS | Mod: PBBFAC,,, | Performed by: UROLOGY

## 2019-01-25 PROCEDURE — 99215 OFFICE O/P EST HI 40 MIN: CPT | Mod: S$PBB,,, | Performed by: UROLOGY

## 2019-01-25 PROCEDURE — 99215 PR OFFICE/OUTPT VISIT, EST, LEVL V, 40-54 MIN: ICD-10-PCS | Mod: S$PBB,,, | Performed by: UROLOGY

## 2019-01-25 PROCEDURE — 99999 PR PBB SHADOW E&M-EST. PATIENT-LVL III: CPT | Mod: PBBFAC,,, | Performed by: UROLOGY

## 2019-01-25 RX ORDER — DULOXETIN HYDROCHLORIDE 30 MG/1
30 CAPSULE, DELAYED RELEASE ORAL DAILY
Qty: 30 CAPSULE | Refills: 3 | Status: SHIPPED | OUTPATIENT
Start: 2019-01-25 | End: 2019-02-07 | Stop reason: CLARIF
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
## 2019-01-28 NOTE — TELEPHONE ENCOUNTER
OAB (overactive bladder)  -     Case Request Operating Room: CYSTOSCOPY, INJECTION, BOTULINUM TOXIN, TYPE A    Urge incontinence  -     Case Request Operating Room: CYSTOSCOPY, INJECTION, BOTULINUM TOXIN, TYPE A    Mixed incontinence  -     Case Request Operating Room: CYSTOSCOPY, INJECTION, BOTULINUM TOXIN, TYPE A

## 2019-01-29 ENCOUNTER — TELEPHONE (OUTPATIENT)
Dept: UROLOGY | Facility: CLINIC | Age: 39
End: 2019-01-29

## 2019-01-29 DIAGNOSIS — N39.3 SUI (STRESS URINARY INCONTINENCE, FEMALE): Primary | ICD-10-CM

## 2019-01-31 RX ORDER — IMIPRAMINE HYDROCHLORIDE 25 MG/1
25 TABLET, FILM COATED ORAL 3 TIMES DAILY
Qty: 90 TABLET | Refills: 11 | Status: SHIPPED | OUTPATIENT
Start: 2019-01-31 | End: 2020-11-10

## 2019-01-31 NOTE — TELEPHONE ENCOUNTER
Will try imipramine 25 mg TID instead of cymbalta.    LENA (stress urinary incontinence, female)  -     imipramine (TOFRANIL) 25 MG tablet; Take 1 tablet (25 mg total) by mouth 3 (three) times daily.  Dispense: 90 tablet; Refill: 11

## 2019-01-31 NOTE — TELEPHONE ENCOUNTER
Prior auth was denied for cymbalta. Is there another rx she can try. She is medicaid so it will need to be generic

## 2019-02-06 DIAGNOSIS — Z01.818 PREOP TESTING: Primary | ICD-10-CM

## 2019-02-06 NOTE — PRE ADMISSION SCREENING
Anesthesia Assessment: Preoperative EQUATION    Planned Procedure: Procedure(s) (LRB):  CYSTOSCOPY (N/A)  INJECTION, BOTULINUM TOXIN, TYPE A (N/A)  Requested Anesthesia Type:Monitor Anesthesia Care  Surgeon: Pollo Park MD  Service: Urology  Known or anticipated Date of Surgery:2/20/2019    Surgeon notes: reviewed    Electronic QUestionnaire Assessment completed via nurse interview with patient.        NO AQ        Triage considerations:     The patient has no apparent active cardiac condition (No unstable coronary Syndrome such as severe unstable angina or recent [<1 month] myocardial infarction, decompensated CHF, severe valvular   disease or significant arrhythmia)    Previous anesthesia records:[facility]  4/19/17    Last PCP note: within 1 month , within [facility] seen by family medicine Karina Romero PA-C    Subspecialty notes: Gastroenterology, urology    Other important co-morbidities:    Allergy      Fibroids      ARNOL (iron deficiency anemia)        OAB      Mixed incontinence         Tests already available:  No recent tests.            Instructions given. (See in Nurse's note)    Optimization:  Anesthesia Preop Clinic Assessment  Indicated    Medical Opinion Indicated       Sub-specialist consult indicated:   TBD       Pre-habilitation suggested:   Physical Therapy  /  Nutrition Consult /  Outpatient Case Management Consult      Plan:    Testing:  BMP   Pre-anesthesia  visit       Visit focus: to be determined     Consultation:notifying pcp of procedure       Navigation: Tests Scheduled.              Consults scheduled.             Results will be tracked by Preop Clinic.

## 2019-02-07 ENCOUNTER — ANESTHESIA EVENT (OUTPATIENT)
Dept: SURGERY | Facility: HOSPITAL | Age: 39
End: 2019-02-07
Payer: MEDICAID

## 2019-02-07 NOTE — ANESTHESIA PREPROCEDURE EVALUATION
Anesthesia Assessment: Preoperative EQUATION     Planned Procedure: Procedure(s) (LRB):  CYSTOSCOPY (N/A)  INJECTION, BOTULINUM TOXIN, TYPE A (N/A)  Requested Anesthesia Type:Monitor Anesthesia Care  Surgeon: Pollo Park MD  Service: Urology  Known or anticipated Date of Surgery:2/20/2019     Surgeon notes: reviewed     Electronic QUestionnaire Assessment completed via nurse interview with patient.         No Aq           Triage considerations:      The patient has no apparent active cardiac condition (No unstable coronary Syndrome such as severe unstable angina or recent [<1 month] myocardial infarction, decompensated CHF, severe valvular   disease or significant arrhythmia)     Previous anesthesia records:[facility]  4/19/17     Last PCP note: within 1 month , within [facility] Saw Karina Romero PA-C (for headaches) / PCP alanna Loera     Subspecialty notes: Gastroenterology, urology     Other important co-morbidities:   Hx spina bifida without hydrocephalus  Stress incontinence  QAB  Headaches        Tests already available:  No recent tests.      EKG 12-lead   Order: [id number]   Status:  Final result   Visible to patient:  No (Not Released) Next appt:  None Dx:  Preop examination      Narrative   Performed by: NEFTALY   Test Reason : Z01.818  Blood Pressure :  Vent. Rate : 055 BPM     Atrial Rate : 055 BPM     P-R Int : 138 ms          QRS Dur : 074 ms      QT Int : 406 ms       P-R-T Axes : 059 064 052 degrees     QTc Int : 388 ms    Sinus bradycardia  Otherwise normal ECG  No previous ECGs available  Confirmed by Mykel Nieves MD ([id number]) on 11/15/2016 9:07:18 AM    Referred By: MARJORIE TRAVIS           Confirmed By:Mykel Nieves MD      Specimen Collected: 11/14/16 12:50 Last Resulted: 11/15/16 09:07                                       Instructions given. (See in Nurse's note)     Optimization:  Plan:               Testing:  BMP   Pre-anesthesia  visit                                        Visit focus: none                            Consultation:notified pcp of procedure                             Navigation: Tests Scheduled.                         Results will be tracked by Preop Clinic.    [facility]  Anesthesia Pre-Operative Evaluation         Patient Name: Umer Rodriguez  YOB: 1980  MRN: [id number]    SUBJECTIVE:     Pre-operative evaluation for Procedure(s) (LRB):  CYSTOSCOPY (N/A)  INJECTION, BOTULINUM TOXIN, TYPE A (N/A)  Scheduled for 2019    HPI 2019:  Umer Rodriguez is a 38 y.o. female with hx of uterine fibroids, spina bifida, tethered spinal cord.    Patient was last seen in clinic by Dr. Park on 2019 for urinary incontinence.    Patient presents for the above procedure(s).    Prev airway:   No prior records in Epic.    Oxygen/Ventilation Requirements:  On room air       Patient Active Problem List   Diagnosis    Uterine fibroids affecting pregnancy    Previous cesareal delivery, declines  - Repeat c/s at 39 weeks    Short interval between pregnancies complicating pregnancy, antepartum    Abdominal pain, epigastric    ARNOL (iron deficiency anemia)    Hammertoe    Toe contracture    Painful orthopaedic hardware    Short toes    Flail toe    Hypertrophic scar    Spina bifida of lumbosacral region without hydrocephalus    LENA (stress urinary incontinence, female)    Tethered spinal cord    Decreased bladder capacity    Detrusor instability       Review of patient's allergies indicates:   Allergen Reactions    Bactrim [sulfamethoxazole-trimethoprim] Rash     Severe itching and rash    Percocet [oxycodone-acetaminophen] Rash       Outpatient Medications:  No current facility-administered medications on file prior to encounter.      Current Outpatient Medications on File Prior to Encounter   Medication Sig Dispense Refill    L norgest/e.estradiol-e.estrad (SEASONIQUE) 0.15 mg-30 mcg (84)/10 mcg (7) 3MPk Take 1 tablet by mouth once  daily. 90 each 2    FLUCELVAX QUAD 6338-4356, PF, 60 mcg (15 mcg x 4)/0.5 mL Syrg vaccine TO BE ADMINISTERED BY PHARMACIST FOR IMMUNIZATION  0        Current Inpatient Medications:      Past Surgical History:   Procedure Laterality Date    ARTHRODESIS-TOE Right 2014    [facility]    BONE GRAFT Right 2016    Performed by [facility] at [facility]     SECTION  ,      SECTION N/A 2013    Performed by [facility] at [facility] L&D    COLONOSCOPY N/A 2014    Performed by Vonda [facility]jias MD at [facility] ENDO    DEBULKING TOE Right 2017    Performed by [facility] at [facility] OR    EGD (ESOPHAGOGASTRODUODENOSCOPY) N/A 2014    Performed by [facility] MD at [facility] ENDO    FOOT SURGERY Right     IMAGING PROCEDURE, GI TRACT, INTRALUMINAL, VIA CAPSULE N/A 3/5/2014    Performed by [facility]ias MD at [facility] ENDO    REPAIR-HAMMER TOE Right 2016    Performed by [facility]irk [facility] at [facility] OR    REPAIR-HAMMER TOE Right 2014    Performed by [facility]ings DPM at [facility] OR       Social History     Socioeconomic History    Marital status: Single     Spouse name: Not on file    Number of children: Not on file    Years of education: Not on file    Highest education level: Not on file   Social Needs    Financial resource strain: Not on file    Food insecurity - worry: Not on file    Food insecurity - inability: Not on file    Transportation needs - medical: Not on file    Transportation needs - non-medical: Not on file   Occupational History    Not on file   Tobacco Use    Smoking status: Never Smoker    Smokeless tobacco: Never Used   Substance and Sexual Activity    Alcohol use: Yes     Comment: occasional    Drug use: No    Sexual activity: Yes     Partners: Male     Birth control/protection: None   Other Topics Concern    Not on file   Social History  Narrative    Not on file       OBJECTIVE:     Weight:  Wt Readings from Last 1 Encounters:   19 77.7 kg (171 lb 4.8 oz)       Recent Blood Pressure Readings:  BP Readings from Last 3 Encounters:   19 99/68   01/15/19 116/72   10/24/18 124/78       Vital Signs Range (Last 24H):         CBC:   Lab Results   Component Value Date    WBC 5.31 04/10/2017    HGB 14.3 04/10/2017    HCT 42.9 04/10/2017    MCV 91 04/10/2017     04/10/2017       CMP:     Chemistry        Component Value Date/Time     2019 1532    K 4.5 2019 1532     2019 1532    CO2 23 2019 1532    BUN 17 2019 1532    CREATININE 0.9 2019 1532    GLU 91 2019 1532        Component Value Date/Time    CALCIUM 9.5 2019 1532    ALKPHOS 54 (L) 10/21/2016 1127    AST 16 10/21/2016 1127    ALT 17 10/21/2016 1127    BILITOT 0.6 10/21/2016 1127    ESTGFRAFRICA >60.0 2019 1532    EGFRNONAA >60.0 2019 1532            INR:  Lab Results   Component Value Date    INR 1.0 10/28/2011       Diagnostic Studies:    EK2016  Vent. Rate : 055 BPM     Atrial Rate : 055 BPM     P-R Int : 138 ms          QRS Dur : 074 ms      QT Int : 406 ms       P-R-T Axes : 059 064 052 degrees     QTc Int : 388 ms    Sinus bradycardia  Otherwise normal ECG  No previous ECGs available     2D Echo:  No results found for this or any previous visit.      ASSESSMENT/PLAN:         Anesthesia Evaluation    I have reviewed the Patient Summary Reports.    I have reviewed the Nursing Notes.   I have reviewed the Medications.     Review of Systems  Anesthesia Hx:  No problems with previous Anesthesia  History of prior surgery of interest to airway management or planning: Previous anesthesia: MAC  debulking toe 17 with MAC.  Denies Family Hx of Anesthesia complications.   Denies Personal Hx of Anesthesia complications.   Social:  Social Alcohol Use, Non-Smoker    Hematology/Oncology:  Hematology Normal    Oncology Normal     EENT/Dental:EENT/Dental Normal   Cardiovascular:  Cardiovascular Normal     Pulmonary:  Pulmonary Normal    Renal/:   OAB// urge incontinence/ mixed incontinence   Musculoskeletal:  Congenital/Developmental Disorder: Spina Bifida  Ankylosing Spondylitis    Neurological:   Hx spina bifida without hydrocephalus Dx of Headaches   Endocrine:  Endocrine Normal    Dermatological:  Skin Normal    Psych:  Psychiatric Normal           Physical Exam  General:  Well nourished    Airway/Jaw/Neck:  Airway Findings: Mouth Opening: Normal Tongue: Normal  General Airway Assessment: Adult  Mallampati: II  Improves to I with phonation.  TM Distance: Normal, at least 6 cm        Eyes/Ears/Nose:  EYES/EARS/NOSE FINDINGS: Normal   Dental:  DENTAL FINDINGS: Normal   Chest/Lungs:  Chest/Lungs Findings: Clear to auscultation, Normal Respiratory Rate     Heart/Vascular:  Heart Findings: Rate: Normal  Rhythm: Regular Rhythm  Sounds: Normal  Heart murmur: negative    Abdomen:  Abdomen Findings: Normal    Musculoskeletal:  Musculoskeletal Findings: Normal   Skin:  Skin Findings: Normal    Mental Status:  Mental Status Findings: Normal        Anesthesia Plan  Type of Anesthesia, risks & benefits discussed:  Anesthesia Type:  general, MAC  Patient's Preference:   Intra-op Monitoring Plan: standard ASA monitors  Intra-op Monitoring Plan Comments:   Post Op Pain Control Plan: multimodal analgesia, IV/PO Opioids PRN and per primary service following discharge from PACU  Post Op Pain Control Plan Comments:   Induction:   IV  Beta Blocker:  Patient is not currently on a Beta-Blocker (No further documentation required).       Informed Consent: Patient understands risks and agrees with Anesthesia plan.  Questions answered. Anesthesia consent signed with patient.  ASA Score: 2     Day of Surgery Review of History & Physical:  There are no significant changes.  H&P update referred to the surgeon.         Ready For Surgery From  Anesthesia Perspective.

## 2019-02-07 NOTE — PRE ADMISSION SCREENING
Anesthesia Assessment: Preoperative EQUATION    Planned Procedure: Procedure(s) (LRB):  CYSTOSCOPY (N/A)  INJECTION, BOTULINUM TOXIN, TYPE A (N/A)  Requested Anesthesia Type:Monitor Anesthesia Care  Surgeon: Pollo Park MD  Service: Urology  Known or anticipated Date of Surgery:2/20/2019    Surgeon notes: reviewed    Electronic QUestionnaire Assessment completed via nurse interview with patient.        No Aq        Triage considerations:     The patient has no apparent active cardiac condition (No unstable coronary Syndrome such as severe unstable angina or recent [<1 month] myocardial infarction, decompensated CHF, severe valvular   disease or significant arrhythmia)    Previous anesthesia records:[facility]  4/19/17    Last PCP note: within 1 month , within [facility] Saw Karina Romero PA-C (for headaches) / PCP alanna Loera    Subspecialty notes: Gastroenterology, urology    Other important co-morbidities:   Hx spina bifida without hydrocephalus  Stress incontinence  QAB  Headaches       Tests already available:  No recent tests.            Instructions given. (See in Nurse's note)    Optimization:  Plan:    Testing:  BMP   Pre-anesthesia  visit       Visit focus: none     Consultation:notified pcp of procedure       Navigation: Tests Scheduled.              Results will be tracked by Preop Clinic.

## 2019-02-08 ENCOUNTER — TELEPHONE (OUTPATIENT)
Dept: PREADMISSION TESTING | Facility: HOSPITAL | Age: 39
End: 2019-02-08

## 2019-02-11 ENCOUNTER — LAB VISIT (OUTPATIENT)
Dept: LAB | Facility: HOSPITAL | Age: 39
End: 2019-02-11
Attending: ANESTHESIOLOGY
Payer: MEDICAID

## 2019-02-11 DIAGNOSIS — Z01.818 PREOP TESTING: ICD-10-CM

## 2019-02-11 LAB
ANION GAP SERPL CALC-SCNC: 9 MMOL/L
BUN SERPL-MCNC: 17 MG/DL
CALCIUM SERPL-MCNC: 9.5 MG/DL
CHLORIDE SERPL-SCNC: 106 MMOL/L
CO2 SERPL-SCNC: 23 MMOL/L
CREAT SERPL-MCNC: 0.9 MG/DL
EST. GFR  (AFRICAN AMERICAN): >60 ML/MIN/1.73 M^2
EST. GFR  (NON AFRICAN AMERICAN): >60 ML/MIN/1.73 M^2
GLUCOSE SERPL-MCNC: 91 MG/DL
POTASSIUM SERPL-SCNC: 4.5 MMOL/L
SODIUM SERPL-SCNC: 138 MMOL/L

## 2019-02-11 PROCEDURE — 80048 BASIC METABOLIC PNL TOTAL CA: CPT

## 2019-02-11 PROCEDURE — 36415 COLL VENOUS BLD VENIPUNCTURE: CPT | Mod: PO

## 2019-02-19 ENCOUNTER — TELEPHONE (OUTPATIENT)
Dept: UROLOGY | Facility: CLINIC | Age: 39
End: 2019-02-19

## 2019-02-19 NOTE — TELEPHONE ENCOUNTER
Called pt to confirm 8:15am arrival time for procedure. Gave pt NPO instructions and gave pt opportunity to ask questions. Pt verbalized understanding.

## 2019-02-20 ENCOUNTER — ANESTHESIA (OUTPATIENT)
Dept: SURGERY | Facility: HOSPITAL | Age: 39
End: 2019-02-20
Payer: MEDICAID

## 2019-02-20 ENCOUNTER — HOSPITAL ENCOUNTER (OUTPATIENT)
Facility: HOSPITAL | Age: 39
Discharge: HOME | End: 2019-02-20
Attending: UROLOGY | Admitting: UROLOGY
Payer: MEDICAID

## 2019-02-20 VITALS
DIASTOLIC BLOOD PRESSURE: 55 MMHG | TEMPERATURE: 99 F | BODY MASS INDEX: 30.21 KG/M2 | HEIGHT: 61 IN | OXYGEN SATURATION: 100 % | WEIGHT: 160 LBS | SYSTOLIC BLOOD PRESSURE: 126 MMHG | HEART RATE: 55 BPM | RESPIRATION RATE: 20 BRPM

## 2019-02-20 DIAGNOSIS — N39.41 URGE INCONTINENCE: Primary | ICD-10-CM

## 2019-02-20 PROCEDURE — 63600175 PHARM REV CODE 636 W HCPCS: Performed by: STUDENT IN AN ORGANIZED HEALTH CARE EDUCATION/TRAINING PROGRAM

## 2019-02-20 PROCEDURE — C1758 CATHETER, URETERAL: HCPCS | Performed by: UROLOGY

## 2019-02-20 PROCEDURE — 71000044 HC DOSC ROUTINE RECOVERY FIRST HOUR: Performed by: UROLOGY

## 2019-02-20 PROCEDURE — 25000003 PHARM REV CODE 250: Performed by: UROLOGY

## 2019-02-20 PROCEDURE — 37000009 HC ANESTHESIA EA ADD 15 MINS: Performed by: UROLOGY

## 2019-02-20 PROCEDURE — D9220A PRA ANESTHESIA: Mod: ,,, | Performed by: ANESTHESIOLOGY

## 2019-02-20 PROCEDURE — 25000003 PHARM REV CODE 250: Performed by: STUDENT IN AN ORGANIZED HEALTH CARE EDUCATION/TRAINING PROGRAM

## 2019-02-20 PROCEDURE — 36000707: Performed by: UROLOGY

## 2019-02-20 PROCEDURE — 63600175 PHARM REV CODE 636 W HCPCS: Mod: JG | Performed by: UROLOGY

## 2019-02-20 PROCEDURE — 71000015 HC POSTOP RECOV 1ST HR: Performed by: UROLOGY

## 2019-02-20 PROCEDURE — 52287 PR CYSTOURETHROSCOPY WITH INJ FOR CHEMODENERVATION: ICD-10-PCS | Mod: ,,, | Performed by: UROLOGY

## 2019-02-20 PROCEDURE — 00910 ANES TRANSURETHRAL PX NOS: CPT | Performed by: UROLOGY

## 2019-02-20 PROCEDURE — 36000706: Performed by: UROLOGY

## 2019-02-20 PROCEDURE — D9220A PRA ANESTHESIA: ICD-10-PCS | Mod: ,,, | Performed by: ANESTHESIOLOGY

## 2019-02-20 PROCEDURE — 37000008 HC ANESTHESIA 1ST 15 MINUTES: Performed by: UROLOGY

## 2019-02-20 PROCEDURE — 52287 CYSTOSCOPY CHEMODENERVATION: CPT | Mod: ,,, | Performed by: UROLOGY

## 2019-02-20 RX ORDER — LIDOCAINE HYDROCHLORIDE 20 MG/ML
JELLY TOPICAL
Status: DISCONTINUED | OUTPATIENT
Start: 2019-02-20 | End: 2019-02-20 | Stop reason: HOSPADM

## 2019-02-20 RX ORDER — PROPOFOL 10 MG/ML
VIAL (ML) INTRAVENOUS
Status: DISCONTINUED | OUTPATIENT
Start: 2019-02-20 | End: 2019-02-20

## 2019-02-20 RX ORDER — FENTANYL CITRATE 50 UG/ML
INJECTION, SOLUTION INTRAMUSCULAR; INTRAVENOUS
Status: DISCONTINUED | OUTPATIENT
Start: 2019-02-20 | End: 2019-02-20

## 2019-02-20 RX ORDER — PROPOFOL 10 MG/ML
VIAL (ML) INTRAVENOUS CONTINUOUS PRN
Status: DISCONTINUED | OUTPATIENT
Start: 2019-02-20 | End: 2019-02-20

## 2019-02-20 RX ORDER — MIDAZOLAM HYDROCHLORIDE 1 MG/ML
INJECTION, SOLUTION INTRAMUSCULAR; INTRAVENOUS
Status: DISCONTINUED | OUTPATIENT
Start: 2019-02-20 | End: 2019-02-20

## 2019-02-20 RX ORDER — SODIUM CHLORIDE 0.9 % (FLUSH) 0.9 %
3 SYRINGE (ML) INJECTION
Status: CANCELLED | OUTPATIENT
Start: 2019-02-20

## 2019-02-20 RX ORDER — LIDOCAINE HCL/PF 100 MG/5ML
SYRINGE (ML) INTRAVENOUS
Status: DISCONTINUED | OUTPATIENT
Start: 2019-02-20 | End: 2019-02-20

## 2019-02-20 RX ORDER — FENTANYL CITRATE 50 UG/ML
25 INJECTION, SOLUTION INTRAMUSCULAR; INTRAVENOUS EVERY 5 MIN PRN
Status: CANCELLED | OUTPATIENT
Start: 2019-02-20

## 2019-02-20 RX ORDER — LIDOCAINE HYDROCHLORIDE 10 MG/ML
1 INJECTION INFILTRATION; PERINEURAL ONCE
Status: DISCONTINUED | OUTPATIENT
Start: 2019-02-20 | End: 2019-02-20 | Stop reason: HOSPADM

## 2019-02-20 RX ORDER — CEFAZOLIN SODIUM 1 G/3ML
2 INJECTION, POWDER, FOR SOLUTION INTRAMUSCULAR; INTRAVENOUS
Status: COMPLETED | OUTPATIENT
Start: 2019-02-20 | End: 2019-02-20

## 2019-02-20 RX ORDER — SODIUM CHLORIDE 9 MG/ML
INJECTION, SOLUTION INTRAVENOUS CONTINUOUS PRN
Status: DISCONTINUED | OUTPATIENT
Start: 2019-02-20 | End: 2019-02-20

## 2019-02-20 RX ADMIN — FENTANYL CITRATE 25 MCG: 50 INJECTION, SOLUTION INTRAMUSCULAR; INTRAVENOUS at 11:02

## 2019-02-20 RX ADMIN — CEFAZOLIN 2 G: 330 INJECTION, POWDER, FOR SOLUTION INTRAMUSCULAR; INTRAVENOUS at 11:02

## 2019-02-20 RX ADMIN — LIDOCAINE HYDROCHLORIDE 75 MG: 20 INJECTION, SOLUTION INTRAVENOUS at 11:02

## 2019-02-20 RX ADMIN — MIDAZOLAM HYDROCHLORIDE 2 MG: 1 INJECTION, SOLUTION INTRAMUSCULAR; INTRAVENOUS at 11:02

## 2019-02-20 RX ADMIN — PROPOFOL 20 MG: 10 INJECTION, EMULSION INTRAVENOUS at 11:02

## 2019-02-20 RX ADMIN — PROPOFOL 10 MG: 10 INJECTION, EMULSION INTRAVENOUS at 11:02

## 2019-02-20 RX ADMIN — SODIUM CHLORIDE: 0.9 INJECTION, SOLUTION INTRAVENOUS at 10:02

## 2019-02-20 RX ADMIN — PROPOFOL 100 MCG/KG/MIN: 10 INJECTION, EMULSION INTRAVENOUS at 11:02
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
## 2019-02-20 NOTE — TRANSFER OF CARE
"Anesthesia Transfer of Care Note    Patient: Umer Rodriguez    Procedure(s) Performed: Procedure(s) (LRB):  CYSTOSCOPY (N/A)  INJECTION, BOTULINUM TOXIN, TYPE A (N/A)    Patient location: PACU    Anesthesia Type: general    Transport from OR: Transported from OR on 6-10 L/min O2 by face mask with adequate spontaneous ventilation    Post pain: adequate analgesia    Post assessment: no apparent anesthetic complications    Post vital signs: stable    Level of consciousness: awake, alert and oriented    Nausea/Vomiting: no nausea/vomiting    Complications: none    Transfer of care protocol was followed      Last vitals:   Visit Vitals  /80 (BP Location: Left arm, Patient Position: Lying)   Pulse 70   Temp 37.1 °C (98.7 °F) (Oral)   Resp 16   Ht 5' 1" (1.549 m)   Wt 72.6 kg (160 lb)   SpO2 100%   Breastfeeding? No   BMI 30.23 kg/m²     "

## 2019-02-20 NOTE — DISCHARGE INSTRUCTIONS
Cystoscopy    Cystoscopy is a procedure that lets your doctor look directly inside your urethra and bladder. It can be used to:  · Help diagnose a problem with your urethra, bladder, or kidneys.  · Take a sample (biopsy) of bladder or urethral tissue.  · Treat certain problems (such as removing kidney stones).  · Place a stent to bypass an obstruction.  · Take special X-rays of the kidneys.  Based on the findings, your doctor may recommend other tests or treatments.  What is a cystoscope?  A cystoscope is a telescope-like instrument that contains lenses and fiberoptics (small glass wires that make bright light). The cystoscope may be straight and rigid, or flexible to bend around curves in the urethra. The doctor may look directly into the cystoscope, or project the image onto a monitor.  Getting ready  · Ask your doctor if you should stop taking any medicines before the procedure.  · Ask whether you should avoid eating or drinking anything after midnight before the procedure.  · Follow any other instructions your doctor gives you.  Tell your doctor before the exam if you:  · Take any medicines, such as aspirin or blood thinners  · Have allergies to any medicines  · Are pregnant   The procedure  Cystoscopy is done in the doctors office, surgery center, or hospital. The doctor and a nurse are present during the procedure. It takes only a few minutes, longer if a biopsy, X-ray, or treatment needs to be done.  During the procedure:  · You lie on an exam table on your back, knees bent and legs apart. You are covered with a drape.  · Your urethra and the area around it are washed. Anesthetic jelly may be applied to numb the urethra. Other pain medicine is usually not needed. In some cases, you may be offered a mild sedative to help you relax. If a more extensive procedure is to be done, such as a biopsy or kidney stone removal, general anesthesia may be needed.  · The cystoscope is inserted. A sterile fluid is put  into the bladder to expand it. You may feel pressure from this fluid.  · When the procedure is done, the cystoscope is removed.  After the procedure  If you had a sedative, general anesthesia, or spinal anesthesia, you must have someone drive you home. Once youre home:  · Drink plenty of fluids.  · You may have burning or light bleeding when you urinate--this is normal.  · Medicines may be prescribed to ease any discomfort or prevent infection. Take these as directed.  · Call your doctor if you have heavy bleeding or blood clots, burning that lasts more than a day, a fever over 100°F  (38° C), or trouble urinating.  Date Last Reviewed: 1/1/2017 © 2000-2017 [employer]. [address] [place] [address], [place] [address]. All rights reserved. This information is not intended as a substitute for professional medical care. Always follow your healthcare professional's instructions.      Recovery After Procedural Sedation (Adult)  You have been given medicine by vein to make you sleep during your surgery. This may have included both a pain medicine and sleeping medicine. Most of the effects have worn off. But you may still have some drowsiness for the next 6 to 8 hours.  Home care  Follow these guidelines when you get home:  · For the next 8 hours, you should be watched by a responsible adult. This person should make sure your condition is not getting worse.  · Don't drink any alcohol for the next 24 hours.  · Don't drive, operate dangerous machinery, or make important business or personal decisions during the next 24 hours.  Note: Your healthcare provider may tell you not to take any medicine by mouth for pain or sleep in the next 4 hours. These medicines may react with the medicines you were given in the hospital. This could cause a much stronger response than usual.  Follow-up care  Follow up with your healthcare provider if you are not alert and back to your usual level of activity within 12 hours.  When to  seek medical advice  Call your healthcare provider right away if any of these occur:  · Drowsiness gets worse  · Weakness or dizziness gets worse  · Repeated vomiting  · You can't be awakened   Date Last Reviewed: 10/18/2016  © 6368-0794 [employer]. [address] [place] [address]. All rights reserved. This information is not intended as a substitute for professional medical care. Always follow your healthcare professional's instructions.    PATIENT INSTRUCTIONS  POST-ANESTHESIA    IMMEDIATELY FOLLOWING SURGERY:  Do not drive or operate machinery for the first twenty four hours after surgery.  Do not make any important decisions for twenty four hours after surgery or while taking narcotic pain medications or sedatives.  If you develop intractable nausea and vomiting or a severe headache please notify your doctor immediately.    FOLLOW-UP:  Please make an appointment with your surgeon as instructed. You do not need to follow up with anesthesia unless specifically instructed to do so.    WOUND CARE INSTRUCTIONS (if applicable):  Keep a dry clean dressing on the anesthesia/puncture wound site if there is drainage.  Once the wound has quit draining you may leave it open to air.  Generally you should leave the bandage intact for twenty four hours unless there is drainage.  If the epidural site drains for more than 36-48 hours please call the anesthesia department.    QUESTIONS?:  Please feel free to call your physician or the hospital  if you have any questions, and they will be happy to assist you.       [facility] Anesthesia Department  [address] [facility]  [phone]

## 2019-02-20 NOTE — INTERVAL H&P NOTE
The patient has been examined and the H&P has been reviewed:  Currently she is on imipramine 25 mg TID instead of cymbalta.  I again explained in detail regarding nature and risk of surgery.  She verbalized her understanding and signed the consent.    I concur with the findings and no changes have occurred since H&P was written.    Anesthesia/Surgery risks, benefits and alternative options discussed and understood by patient/family.          Active Hospital Problems    Diagnosis  POA    Urge incontinence [N39.41]  Yes      Resolved Hospital Problems   No resolved problems to display.

## 2019-02-20 NOTE — PLAN OF CARE
Patient and mother state they are ready to be discharged. Instructions given to patient and family. Both verbalize understanding. Patient tolerating po liquids with no difficulty. Patient states pain is at a tolerable level for them. Anesthesia consent and surgical consent in chart upon patient's discharge from [facility].

## 2019-02-20 NOTE — ANESTHESIA POSTPROCEDURE EVALUATION
"Anesthesia Post Evaluation    Patient: Umer Rodriguez    Procedure(s) Performed: Procedure(s) (LRB):  CYSTOSCOPY (N/A)  INJECTION, BOTULINUM TOXIN, TYPE A (N/A)    Final Anesthesia Type: general  Patient location during evaluation: PACU  Patient participation: Yes- Able to Participate  Level of consciousness: awake and alert  Post-procedure vital signs: reviewed and stable  Pain management: adequate  Airway patency: patent  PONV status at discharge: No PONV  Anesthetic complications: no      Cardiovascular status: blood pressure returned to baseline  Respiratory status: unassisted  Hydration status: euvolemic  Follow-up not needed.        Visit Vitals  BP (!) 126/55   Pulse (!) 55   Temp 37.1 °C (98.7 °F) (Skin)   Resp 20   Ht 5' 1" (1.549 m)   Wt 72.6 kg (160 lb)   SpO2 100%   Breastfeeding? No   BMI 30.23 kg/m²       Pain/Sissy Score: Sissy Score: 9 (2/20/2019 11:50 AM)        "

## 2019-02-20 NOTE — DISCHARGE SUMMARY
[facility]  Discharge Note  Short Stay    Admit Date: 2/20/2019    Discharge Date and Time: 02/20/2019 11:55 AM      Attending Physician: Pollo Park MD     Discharge Provider: Dandy Zamarripa    Diagnoses:  Active Hospital Problems    Diagnosis  POA    *Urge incontinence [N39.41]  Yes      Resolved Hospital Problems   No resolved problems to display.       Discharged Condition: good    Hospital Course: Patient was admitted for cysto botox and tolerated the procedure well with no complications. The patient was discharged home in good condition on the same day.       Final Diagnoses: Same as principal problem.    Disposition: Home or Self Care    Follow up/Patient Instructions:    Medications:  Reconciled Home Medications:   Current Discharge Medication List      CONTINUE these medications which have NOT CHANGED    Details   imipramine (TOFRANIL) 25 MG tablet Take 1 tablet (25 mg total) by mouth 3 (three) times daily.  Qty: 90 tablet, Refills: 11    Associated Diagnoses: LENA (stress urinary incontinence, female)      L norgest/e.estradiol-e.estrad (SEASONIQUE) 0.15 mg-30 mcg (84)/10 mcg (7) 3MPk Take 1 tablet by mouth once daily.  Qty: 90 each, Refills: 2    Associated Diagnoses: Encounter for surveillance of contraceptive pills      FLUCELVAX QUAD 2636-3527, PF, 60 mcg (15 mcg x 4)/0.5 mL Syrg vaccine TO BE ADMINISTERED BY PHARMACIST FOR IMMUNIZATION  Refills: 0           Discharge Procedure Orders   Call MD for:  persistent nausea and vomiting or diarrhea     Call MD for:  severe uncontrolled pain     Call MD for:  persistent dizziness, light-headedness, or visual disturbances     Call MD for:   Order Comments: Temperature > 101F     Follow-up Information     Pollo Park MD In 2 months.    Specialty:  Urology  Contact information:  [address]  [place] [id number] [phone]                   Discharge Procedure Orders (must include Diet, Follow-up, Activity):   Discharge Procedure Orders  (must include Diet, Follow-up, Activity)   Call MD for:  persistent nausea and vomiting or diarrhea     Call MD for:  severe uncontrolled pain     Call MD for:  persistent dizziness, light-headedness, or visual disturbances     Call MD for:   Order Comments: Temperature > 101F

## 2019-03-20 ENCOUNTER — PATIENT MESSAGE (OUTPATIENT)
Dept: UROLOGY | Facility: CLINIC | Age: 39
End: 2019-03-20

## 2019-03-21 ENCOUNTER — OFFICE VISIT (OUTPATIENT)
Dept: UROLOGY | Facility: CLINIC | Age: 39
End: 2019-03-21
Payer: MEDICAID

## 2019-03-21 VITALS
SYSTOLIC BLOOD PRESSURE: 126 MMHG | BODY MASS INDEX: 31.92 KG/M2 | HEART RATE: 77 BPM | HEIGHT: 61 IN | WEIGHT: 169.06 LBS | DIASTOLIC BLOOD PRESSURE: 78 MMHG

## 2019-03-21 DIAGNOSIS — R82.71 BACTERIA IN URINE: Primary | ICD-10-CM

## 2019-03-21 DIAGNOSIS — R39.9 UTI SYMPTOMS: ICD-10-CM

## 2019-03-21 PROCEDURE — 87088 URINE BACTERIA CULTURE: CPT

## 2019-03-21 PROCEDURE — 87077 CULTURE AEROBIC IDENTIFY: CPT

## 2019-03-21 PROCEDURE — 87186 SC STD MICRODIL/AGAR DIL: CPT

## 2019-03-21 PROCEDURE — 51701 INSERT BLADDER CATHETER: CPT | Mod: S$PBB,,, | Performed by: NURSE PRACTITIONER

## 2019-03-21 PROCEDURE — 81002 URINALYSIS NONAUTO W/O SCOPE: CPT | Mod: PBBFAC | Performed by: NURSE PRACTITIONER

## 2019-03-21 PROCEDURE — 99999 PR PBB SHADOW E&M-EST. PATIENT-LVL IV: CPT | Mod: PBBFAC,,, | Performed by: NURSE PRACTITIONER

## 2019-03-21 PROCEDURE — 99214 OFFICE O/P EST MOD 30 MIN: CPT | Mod: PBBFAC | Performed by: NURSE PRACTITIONER

## 2019-03-21 PROCEDURE — 51701 INSERT BLADDER CATHETER: CPT | Mod: PBBFAC | Performed by: NURSE PRACTITIONER

## 2019-03-21 PROCEDURE — 99214 PR OFFICE/OUTPT VISIT, EST, LEVL IV, 30-39 MIN: ICD-10-PCS | Mod: S$PBB,25,, | Performed by: NURSE PRACTITIONER

## 2019-03-21 PROCEDURE — 87086 URINE CULTURE/COLONY COUNT: CPT

## 2019-03-21 PROCEDURE — 99999 PR PBB SHADOW E&M-EST. PATIENT-LVL IV: ICD-10-PCS | Mod: PBBFAC,,, | Performed by: NURSE PRACTITIONER

## 2019-03-21 PROCEDURE — 51701 PR INSERTION OF NON-INDWELLING BLADDER CATHETERIZATION FOR RESIDUAL UR: ICD-10-PCS | Mod: S$PBB,,, | Performed by: NURSE PRACTITIONER

## 2019-03-21 PROCEDURE — 99214 OFFICE O/P EST MOD 30 MIN: CPT | Mod: S$PBB,25,, | Performed by: NURSE PRACTITIONER

## 2019-03-21 RX ORDER — CEPHALEXIN 500 MG/1
500 CAPSULE ORAL EVERY 12 HOURS
Qty: 14 CAPSULE | Refills: 0 | Status: SHIPPED | OUTPATIENT
Start: 2019-03-21 | End: 2019-03-25 | Stop reason: SDUPTHER

## 2019-03-21 NOTE — PROGRESS NOTES
CHIEF COMPLAINT:    Mrs Rodriguez is a 38 y.o. female presenting for possible UTI.  PRESENTING ILLNESS:    Umer Rodriguez is a 38 y.o. female with a PMH of spina bifida and mixed urinary incontinence who presents for possible UTI. Her last clinic visit was 19 with Dr. Park.    Pt had botox injection 19 for urge incontinence.     Today patient c/o frequency voiding small amounts, urgency, dysuria, and feeling of incomplete bladder emptying. She has to strain to try to empty her bladder. She is voiding 10 x more during the daytime than she was prior to surgery. Denies fever or chills. Denies flank pain or hematuria.   She continues to have LENA with jumping, sneezing or coughing.   She does not drinks as much water as she should because it worsens her frequency and urgency.      REVIEW OF SYSTEMS:    Review of Systems    Constitutional: Negative for fever and chills.   HENT: Negative for hearing loss.   Eyes: Negative for visual disturbance.   Respiratory: Negative for shortness of breath.   Cardiovascular: Negative for chest pain.   Gastrointestinal: Negative for nausea, vomiting, and constipation.   Genitourinary:  See above   Neurological: Negative for dizziness.   Hematological: Does not bruise/bleed easily.   Psychiatric/Behavioral: Negative for confusion.     PATIENT HISTORY:    Past Medical History:   Diagnosis Date    Allergy     Fibroids     ARNOL (iron deficiency anemia) 2014       Past Surgical History:   Procedure Laterality Date    ARTHRODESIS-TOE Right 2014    [facility] at [facility]    BONE GRAFT Right 2016    Performed [facility] at [facility] OR     SECTION  ,      SECTION N/A 2013    Performed by Alvaro Jerry III, [facility] at [facility] L&D    COLONOSCOPY N/A 2014    Performed by Vonda Mejias MD at [facility] ENDO    CYSTOSCOPY N/A 2019    [facility]     DEBULKING TOE Right 4/19/2017    Per[facility]    EGD (ESOPHAGOGASTRODUODENOSCOPY) N/A 1/21/2014    Performed by Vonda Mejias MD at [facility] ENDO    FOOT SURGERY Right     IMAGING PROCEDURE, GI TRACT, INTRALUMINAL, VIA CAPSULE N/A 3/5/2014    Performed by Vonda [facility]jias MD at [facility] ENDO    INJECTION, BOTULINUM TOXIN, TYPE A N/A 2/20/2019    [facility] Poll[facility] 2ND [facility]    REPAIR-HAMMER TOE Right 12/7/2016    Performed by [facility]ings [facility] at [facility] OR    REPAIR-HAMMER TOE Right 6/25/2014    Performed by [facility]irk Cummings D[facility] at [facility] OR       Family History   Problem Relation Age of Onset    Hypertension Mother     Breast cancer Neg Hx     Colon cancer Neg Hx     Ovarian cancer Neg Hx     Hyperlipidemia Neg Hx     Heart disease Neg Hx     Diabetes Neg Hx     Cancer Neg Hx     Arthritis Neg Hx        Social History     Socioeconomic History    Marital status: Single     Spouse name: Not on file    Number of children: Not on file    Years of education: Not on file    Highest education level: Not on file   Social Needs    Financial resource strain: Not on file    Food insecurity - worry: Not on file    Food insecurity - inability: Not on file    Transportation needs - medical: Not on file    Transportation needs - non-medical: Not on file   Occupational History    Not on file   Tobacco Use    Smoking status: Never Smoker    Smokeless tobacco: Never Used   Substance and Sexual Activity    Alcohol use: Yes     Comment: occasional    Drug use: No    Sexual activity: Yes     Partners: Male     Birth control/protection: None   Other Topics Concern    Not on file   Social History Narrative    Not on file       Allergies:  Bactrim [sulfamethoxazole-trimethoprim] and Percocet [oxycodone-acetaminophen]    Medications:    Current Outpatient Medications:     FLUCELVAX QUAD [id number], PF, 60 mcg (15 mcg x 4)/0.5 mL Syrg  vaccine, TO BE ADMINISTERED BY PHARMACIST FOR IMMUNIZATION, Disp: , Rfl: 0    L norgest/e.estradiol-e.estrad (SEASONIQUE) 0.15 mg-30 mcg (84)/10 mcg (7) 3MPk, Take 1 tablet by mouth once daily., Disp: 90 each, Rfl: 2    cephALEXin (KEFLEX) 500 MG capsule, Take 1 capsule (500 mg total) by mouth every 12 (twelve) hours. for 7 days, Disp: 14 capsule, Rfl: 0    imipramine (TOFRANIL) 25 MG tablet, Take 1 tablet (25 mg total) by mouth 3 (three) times daily., Disp: 90 tablet, Rfl: 11    PHYSICAL EXAMINATION:    Constitutional: She is oriented to person, place, and time. She appears well-developed and well-nourished.  She is in no apparent distress.    Neck: Normal ROM.     Cardiovascular: Normal rate.      Pulmonary/Chest: Effort normal. No respiratory distress.     Abdominal:  She exhibits no distension.  There is no CVA tenderness.     Lymphadenopathy:          Right: No supraclavicular adenopathy present.        Left: No supraclavicular adenopathy present.     Neurological: She is alert and oriented to person, place, and time.     Skin: Skin is warm and dry.     Extremities: Normal ROM    Psych: Cooperative with normal affect.    Genitourinary:  Normal external female genitalia  Urethral meatus is normal  Urethra and bladder are nontender to bimanual exam      Physical Exam      LABS:    U/a: sp grav 1.015, pH 6, + leukocytes, + nitrite, trace protein, 50 blood, otherwise negative    PVR: Consent verbally obtained.  Betadine prep was applied to the urethral meatus. An in and out cath was performed after voiding.  The PVR was 60 ml.      IMPRESSION:    Encounter Diagnoses   Name Primary?    Bacteria in urine Yes    UTI symptoms        PLAN:  -Catheterized urine specimen sent for urine culture  Will start patient on keflex based on symptoms and POCT UA. Discussed side effects, indications, and MOA for keflex. Prescription sent to the pharmacy. Pt verbalized understanding. Denies any reactions to keflex in the  past.  (pt is allergic to bactrim. She reports a rash develops after 3 days of augmentin)  -Reassured patient she is emptying her bladder  -Discussed kegel exercises for LENA  -UTI prevention discussed with patient  -RTC 2 months with Dr. Park for f/u after botox injection.        I spent 25 minutes with the patient of which more than half was spent in coordinating the patient's care as well as in direct consultation with the patient in regards to our treatment and plan.

## 2019-03-24 LAB — BACTERIA UR CULT: NORMAL

## 2019-03-25 DIAGNOSIS — R82.71 BACTERIA IN URINE: ICD-10-CM

## 2019-03-25 RX ORDER — CEPHALEXIN 500 MG/1
500 CAPSULE ORAL EVERY 12 HOURS
Qty: 6 CAPSULE | Refills: 0 | Status: SHIPPED | OUTPATIENT
Start: 2019-03-25 | End: 2019-03-28

## 2019-03-27 ENCOUNTER — PATIENT MESSAGE (OUTPATIENT)
Dept: UROLOGY | Facility: CLINIC | Age: 39
End: 2019-03-27

## 2019-03-28 ENCOUNTER — PATIENT MESSAGE (OUTPATIENT)
Dept: UROLOGY | Facility: CLINIC | Age: 39
End: 2019-03-28

## 2019-06-21 ENCOUNTER — OFFICE VISIT (OUTPATIENT)
Dept: FAMILY MEDICINE | Facility: CLINIC | Age: 39
End: 2019-06-21
Payer: MEDICAID

## 2019-06-21 VITALS
WEIGHT: 170.88 LBS | TEMPERATURE: 98 F | HEART RATE: 67 BPM | SYSTOLIC BLOOD PRESSURE: 102 MMHG | HEIGHT: 61 IN | RESPIRATION RATE: 20 BRPM | OXYGEN SATURATION: 97 % | DIASTOLIC BLOOD PRESSURE: 70 MMHG | BODY MASS INDEX: 32.26 KG/M2

## 2019-06-21 DIAGNOSIS — H57.89 EYE IRRITATION: Primary | ICD-10-CM

## 2019-06-21 PROCEDURE — 99213 OFFICE O/P EST LOW 20 MIN: CPT | Mod: S$PBB,,, | Performed by: PHYSICIAN ASSISTANT

## 2019-06-21 PROCEDURE — 99213 OFFICE O/P EST LOW 20 MIN: CPT | Mod: PBBFAC,PO | Performed by: PHYSICIAN ASSISTANT

## 2019-06-21 PROCEDURE — 99999 PR PBB SHADOW E&M-EST. PATIENT-LVL III: CPT | Mod: PBBFAC,,, | Performed by: PHYSICIAN ASSISTANT

## 2019-06-21 PROCEDURE — 99999 PR PBB SHADOW E&M-EST. PATIENT-LVL III: ICD-10-PCS | Mod: PBBFAC,,, | Performed by: PHYSICIAN ASSISTANT

## 2019-06-21 PROCEDURE — 99213 PR OFFICE/OUTPT VISIT, EST, LEVL III, 20-29 MIN: ICD-10-PCS | Mod: S$PBB,,, | Performed by: PHYSICIAN ASSISTANT

## 2019-06-21 RX ORDER — NEOMYCIN SULFATE, POLYMYXIN B SULFATE, AND DEXAMETHASONE 3.5; 10000; 1 MG/G; [USP'U]/G; MG/G
OINTMENT OPHTHALMIC 3 TIMES DAILY
COMMUNITY
End: 2020-11-10

## 2019-06-21 RX ORDER — OLOPATADINE HYDROCHLORIDE 2 MG/ML
1 SOLUTION/ DROPS OPHTHALMIC DAILY
Qty: 2.5 ML | Refills: 0 | Status: SHIPPED | OUTPATIENT
Start: 2019-06-21 | End: 2020-11-10

## 2019-06-21 RX ORDER — DOXYCYCLINE HYCLATE 100 MG
100 TABLET ORAL DAILY
COMMUNITY
End: 2019-08-26

## 2019-06-21 NOTE — PROGRESS NOTES
Subjective:       Patient ID: Umer Rodriguez is a 39 y.o. female.    Chief Complaint: Conjunctivitis    Eye Problem    Both eyes are affected.This is a new problem. The current episode started 1 to 4 weeks ago. The problem occurs constantly. The problem has been gradually improving. There was no injury mechanism. Associated symptoms include eye redness, a foreign body sensation and itching. Pertinent negatives include no photophobia. She has tried eye drops (polymyxin, doxycyclin) for the symptoms.     Review of Systems   HENT: Positive for congestion.    Eyes: Positive for redness and itching. Negative for photophobia.       Objective:      Physical Exam   Constitutional: She appears well-developed and well-nourished.   HENT:   Head: Normocephalic and atraumatic.       Eyes: Pupils are equal, round, and reactive to light. EOM and lids are normal. Right eye exhibits no discharge, no exudate and no hordeolum. Left eye exhibits no discharge, no exudate and no hordeolum. Right conjunctiva is injected. Left conjunctiva is injected.   Vitals reviewed.      Assessment:       1. Eye irritation        Plan:         Umer was seen today for conjunctivitis.    Diagnoses and all orders for this visit:    Eye irritation  -     olopatadine (PATADAY) 0.2 % Drop; Place 1 drop into both eyes once daily.  -     Advised allegra during day, benadryl at night, continue meds from eye doctor. F/u Monday if no relief. Go to ED if pain, fever, vision changes occur

## 2019-06-23 ENCOUNTER — PATIENT MESSAGE (OUTPATIENT)
Dept: FAMILY MEDICINE | Facility: CLINIC | Age: 39
End: 2019-06-23

## 2019-06-25 ENCOUNTER — TELEPHONE (OUTPATIENT)
Dept: FAMILY MEDICINE | Facility: CLINIC | Age: 39
End: 2019-06-25

## 2019-07-16 ENCOUNTER — PATIENT OUTREACH (OUTPATIENT)
Dept: ADMINISTRATIVE | Facility: OTHER | Age: 39
End: 2019-07-16

## 2019-07-16 DIAGNOSIS — Z12.31 ENCOUNTER FOR SCREENING MAMMOGRAM FOR MALIGNANT NEOPLASM OF BREAST: Primary | ICD-10-CM

## 2019-07-18 ENCOUNTER — TELEPHONE (OUTPATIENT)
Dept: ORTHOPEDICS | Facility: CLINIC | Age: 39
End: 2019-07-18

## 2019-07-18 ENCOUNTER — OFFICE VISIT (OUTPATIENT)
Dept: PODIATRY | Facility: CLINIC | Age: 39
End: 2019-07-18
Payer: MEDICAID

## 2019-07-18 ENCOUNTER — HOSPITAL ENCOUNTER (OUTPATIENT)
Dept: RADIOLOGY | Facility: HOSPITAL | Age: 39
Discharge: HOME | End: 2019-07-18
Attending: PODIATRIST
Payer: MEDICAID

## 2019-07-18 VITALS
HEART RATE: 66 BPM | HEIGHT: 61 IN | BODY MASS INDEX: 32.1 KG/M2 | DIASTOLIC BLOOD PRESSURE: 73 MMHG | SYSTOLIC BLOOD PRESSURE: 105 MMHG | WEIGHT: 170 LBS

## 2019-07-18 DIAGNOSIS — M20.5X2 ACQUIRED MALLET TOE, LEFT: ICD-10-CM

## 2019-07-18 DIAGNOSIS — M20.5X2 ACQUIRED CURLY TOE, LEFT: ICD-10-CM

## 2019-07-18 DIAGNOSIS — M20.5X2 ACQUIRED CURLY TOE, LEFT: Primary | ICD-10-CM

## 2019-07-18 PROCEDURE — 73630 X-RAY EXAM OF FOOT: CPT | Mod: TC,PN,LT

## 2019-07-18 PROCEDURE — 99999 PR PBB SHADOW E&M-EST. PATIENT-LVL III: ICD-10-PCS | Mod: PBBFAC,,, | Performed by: PODIATRIST

## 2019-07-18 PROCEDURE — 73630 XR FOOT COMPLETE 3 VIEW LEFT: ICD-10-PCS | Mod: 26,LT,, | Performed by: RADIOLOGY

## 2019-07-18 PROCEDURE — 99213 OFFICE O/P EST LOW 20 MIN: CPT | Mod: S$PBB,,, | Performed by: PODIATRIST

## 2019-07-18 PROCEDURE — 99213 PR OFFICE/OUTPT VISIT, EST, LEVL III, 20-29 MIN: ICD-10-PCS | Mod: S$PBB,,, | Performed by: PODIATRIST

## 2019-07-18 PROCEDURE — 99213 OFFICE O/P EST LOW 20 MIN: CPT | Mod: PBBFAC,25,PN | Performed by: PODIATRIST

## 2019-07-18 PROCEDURE — 73630 X-RAY EXAM OF FOOT: CPT | Mod: 26,LT,, | Performed by: RADIOLOGY

## 2019-07-18 PROCEDURE — 99999 PR PBB SHADOW E&M-EST. PATIENT-LVL III: CPT | Mod: PBBFAC,,, | Performed by: PODIATRIST

## 2019-07-18 NOTE — TELEPHONE ENCOUNTER
----- Message from Rylan Hernandez MA sent at 7/18/2019 11:23 AM CDT -----  8/9/19 @ 11:15 3wk f/u Dr. Cummings

## 2019-07-19 NOTE — PROGRESS NOTES
"Subjective:      Patient ID: Umer Rodriguez is a 39 y.o. female.    Chief Complaint: Foot Pain (left foot pain third toe )    Presents complaining of developing hammertoe deformities to left foot.  History of previous hammertoe correction toes 3-5 right foot. Denies any trauma or changes in activity or shoe gear.  Pain only in enclosed shoes.  Relates the top of her shoe rubs up against toes 3-5 left foot.    Vitals:    19 1059   BP: 105/73   Pulse: 66   Weight: 77.1 kg (170 lb)   Height: 5' 1" (1.549 m)   PainSc:   7      Past Medical History:   Diagnosis Date    Allergy     Fibroids     ARNOL (iron deficiency anemia) 2014       Past Surgical History:   Procedure Laterality Date    ARTHRODESIS-TOE Right 2014    Performed by [facility]irk [facility]ings [facility] at [facility] OR    BONE GRAFT Right 2016    Performed by [facility]irk [facility]ings [facility] at [facility] OR     SECTION  ,      SECTION N/A 2013    Performed by Alvaro Jerry III, MD at [facility] L&D    COLONOSCOPY N/A 2014    Performed by Vonda Mejias MD at [facility] ENDO    CYSTOSCOPY N/A 2019    Performed by Pollo Park MD at [facility] OR 2ND FLR    DEBULKING TOE Right 2017    Performed by Dirk Cummings DPM at [facility] OR    EGD (ESOPHAGOGASTRODUODENOSCOPY) N/A 2014    Performed by Vonda [facility]jias MD at [facility] ENDO    FOOT SURGERY Right     IMAGING PROCEDURE, GI TRACT, INTRALUMINAL, VIA CAPSULE N/A 3/5/2014    Performed by V[facility]ias MD at [facility] ENDO    INJECTION, BOTULINUM TOXIN, TYPE A N/A 2019    Performed by Pollo Park MD at [facility] OR 2ND FLR    REPAIR-HAMMER TOE Right 2016    Performed by [facility]irk [facility]ings [facility] at [facility] OR    REPAIR-HAMMER TOE Right 2014    Performed by Dirk Cummings DPM at [facility] OR       Family History   Problem Relation Age of Onset    Hypertension Mother     Breast cancer Neg Hx     Colon cancer Neg Hx     Ovarian cancer Neg Hx  "    Hyperlipidemia Neg Hx     Heart disease Neg Hx     Diabetes Neg Hx     Cancer Neg Hx     Arthritis Neg Hx        Social History     Socioeconomic History    Marital status: Single     Spouse name: Not on file    Number of children: Not on file    Years of education: Not on file    Highest education level: Not on file   Occupational History    Not on file   Social Needs    Financial resource strain: Not on file    Food insecurity:     Worry: Not on file     Inability: Not on file    Transportation needs:     Medical: Not on file     Non-medical: Not on file   Tobacco Use    Smoking status: Never Smoker    Smokeless tobacco: Never Used   Substance and Sexual Activity    Alcohol use: Yes     Comment: occasional    Drug use: No    Sexual activity: Yes     Partners: Male     Birth control/protection: None   Lifestyle    Physical activity:     Days per week: Not on file     Minutes per session: Not on file    Stress: Not on file   Relationships    Social connections:     Talks on phone: Not on file     Gets together: Not on file     Attends Sabianism service: Not on file     Active member of club or organization: Not on file     Attends meetings of clubs or organizations: Not on file     Relationship status: Not on file   Other Topics Concern    Not on file   Social History Narrative    Not on file       Current Outpatient Medications   Medication Sig Dispense Refill    L norgest/e.estradiol-e.estrad (SEASONIQUE) 0.15 mg-30 mcg (84)/10 mcg (7) 3MPk Take 1 tablet by mouth once daily. 90 each 2    neomycin-polymyxin-dexamethasone (DEXACINE) 3.5 mg/g-10,000 unit/g-0.1 % Oint 3 (three) times daily.      olopatadine (PATADAY) 0.2 % Drop Place 1 drop into both eyes once daily. 2.5 mL 0    doxycycline (VIBRA-TABS) 100 MG tablet Take 100 mg by mouth once daily.      FLUCELVAX QUAD 4827-0377, PF, 60 mcg (15 mcg x 4)/0.5 mL Syrg vaccine TO BE ADMINISTERED BY PHARMACIST FOR IMMUNIZATION  0     imipramine (TOFRANIL) 25 MG tablet Take 1 tablet (25 mg total) by mouth 3 (three) times daily. 90 tablet 11     No current facility-administered medications for this visit.        Review of patient's allergies indicates:  No Known Allergies      Review of Systems   Constitution: Negative for chills, fever and malaise/fatigue.   HENT: Negative for congestion.    Cardiovascular: Negative for chest pain, claudication and leg swelling.   Respiratory: Negative for cough and shortness of breath.    Skin: Negative for color change, itching, poor wound healing and rash.   Musculoskeletal: Negative for back pain, joint pain, muscle cramps and muscle weakness.   Gastrointestinal: Negative for nausea and vomiting.   Neurological: Negative for numbness, paresthesias and weakness.   Psychiatric/Behavioral: Negative for altered mental status.           Objective:      Physical Exam   Constitutional: She is oriented to person, place, and time. She appears well-developed and well-nourished. No distress.   Cardiovascular: Intact distal pulses.   Pulses:       Dorsalis pedis pulses are 2+ on the right side, and 2+ on the left side.        Posterior tibial pulses are 2+ on the right side, and 2+ on the left side.   Musculoskeletal:   Mild reducible flexion contracture of the PIPJ and DIPJ left 3rd toe.  Reducible flexion adductovarus contracture of the PIPJ and DIPJ left 4th toe.  Mild reducible adductovarus contracture the 5th toe with small tailor's bunion noted left foot.    Supinated foot structure bilateral foot.   Neurological: She is alert and oriented to person, place, and time. She has normal strength. No sensory deficit. Gait normal.   Skin: Skin is warm, dry and intact. Capillary refill takes less than 2 seconds. No ecchymosis and no rash noted. She is not diaphoretic. No cyanosis or erythema. No pallor. Nails show no clubbing.             Assessment:       Encounter Diagnoses   Name Primary?    Acquired curly toe, left  Yes    Acquired mallet toe, left          Plan:       Umer was seen today for foot pain.    Diagnoses and all orders for this visit:    Acquired curly toe, left  -     X-Ray Foot Complete Left; Future    Acquired mallet toe, left  -     X-Ray Foot Complete Left; Future      I counseled the patient on her conditions, their implications and medical management.    Weight-bearing x-ray of the left foot ordered to further assess her digital deformities.    Clinically discussed that her deformities are reducible to the left foot.  May consider percutaneous flexor tenotomy of toes 3 and 4 versus DIPJ arthroplasty of the left 3rd toe and proximal interphalangeal joint arthrodesis of the 4th toe and flexor tenotomy.    Return to clinic within 2-3 weeks or as needed per discussion.  Also discussed importance of wearing shoes with adequate room in the toe box or to prevent toe irritation.    .

## 2019-08-01 DIAGNOSIS — Z30.41 ENCOUNTER FOR SURVEILLANCE OF CONTRACEPTIVE PILLS: ICD-10-CM

## 2019-08-01 RX ORDER — LEVONORGESTREL AND ETHINYL ESTRADIOL AND ETHINYL ESTRADIOL 150-30(84)
KIT ORAL
Qty: 28 EACH | Refills: 2 | Status: SHIPPED | OUTPATIENT
Start: 2019-08-01 | End: 2020-04-06 | Stop reason: SDUPTHER

## 2019-08-07 ENCOUNTER — PATIENT OUTREACH (OUTPATIENT)
Dept: ADMINISTRATIVE | Facility: OTHER | Age: 39
End: 2019-08-07

## 2019-08-09 ENCOUNTER — PATIENT MESSAGE (OUTPATIENT)
Dept: PODIATRY | Facility: CLINIC | Age: 39
End: 2019-08-09

## 2019-08-14 ENCOUNTER — PATIENT OUTREACH (OUTPATIENT)
Dept: ADMINISTRATIVE | Facility: OTHER | Age: 39
End: 2019-08-14

## 2019-08-22 ENCOUNTER — PATIENT OUTREACH (OUTPATIENT)
Dept: ADMINISTRATIVE | Facility: OTHER | Age: 39
End: 2019-08-22

## 2019-08-26 ENCOUNTER — OFFICE VISIT (OUTPATIENT)
Dept: PODIATRY | Facility: CLINIC | Age: 39
End: 2019-08-26
Payer: MEDICAID

## 2019-08-26 VITALS
SYSTOLIC BLOOD PRESSURE: 107 MMHG | WEIGHT: 170 LBS | DIASTOLIC BLOOD PRESSURE: 73 MMHG | HEART RATE: 74 BPM | BODY MASS INDEX: 32.1 KG/M2 | HEIGHT: 61 IN

## 2019-08-26 DIAGNOSIS — M20.5X2 ACQUIRED CURLY TOE, LEFT: Primary | ICD-10-CM

## 2019-08-26 PROCEDURE — 99999 PR PBB SHADOW E&M-EST. PATIENT-LVL III: CPT | Mod: PBBFAC,,, | Performed by: PODIATRIST

## 2019-08-26 PROCEDURE — 99213 OFFICE O/P EST LOW 20 MIN: CPT | Mod: S$PBB,,, | Performed by: PODIATRIST

## 2019-08-26 PROCEDURE — 99999 PR PBB SHADOW E&M-EST. PATIENT-LVL III: ICD-10-PCS | Mod: PBBFAC,,, | Performed by: PODIATRIST

## 2019-08-26 PROCEDURE — 99213 OFFICE O/P EST LOW 20 MIN: CPT | Mod: PBBFAC,PN | Performed by: PODIATRIST

## 2019-08-26 PROCEDURE — 99213 PR OFFICE/OUTPT VISIT, EST, LEVL III, 20-29 MIN: ICD-10-PCS | Mod: S$PBB,,, | Performed by: PODIATRIST

## 2019-08-27 NOTE — PROGRESS NOTES
"Subjective:      Patient ID: Umer Rodriguez is a 39 y.o. female.    Chief Complaint: Follow-up    Presents complaining of developing hammertoe deformities to left foot.  History of previous hammertoe correction toes 3-5 right foot. Denies any trauma or changes in activity or shoe gear.  Pain only in enclosed shoes.  Relates the top of her shoe rubs up against toes 3-5 left foot.    2019:  Follow-up from left foot x-ray.  As the previous exam she complains mostly of discomfort overlying the distal left 3rd toe and sometimes the 4th toe in enclosed shoes.  Relatively symptomatic in open-toed shoes.  Denies any trauma.    Vitals:    19 0916   BP: 107/73   Pulse: 74   Weight: 77.1 kg (170 lb)   Height: 5' 1" (1.549 m)   PainSc: 0-No pain      Past Medical History:   Diagnosis Date    Allergy     Fibroids     ARNOL (iron deficiency anemia) 2014       Past Surgical History:   Procedure Laterality Date    ARTHRODESIS-TOE Right 2014    Performed by [facility] at [facility] OR    BONE GRAFT Right 2016    Performed by [facility] at [facility] OR     SECTION  ,      SECTION N/A 2013    Performed by Al[facility]o Jerry III, MD at [facility] L&D    COLONOSCOPY N/A 2014    Performed by Vonda Mejias MD at [facility] ENDO    CYSTOSCOPY N/A 2019    Performed by Pollo Park MD at [facility] OR 2ND FLR    DEBULKING TOE Right 2017    Performed by Dirk Cummings DPM at [facility] OR    EGD (ESOPHAGOGASTRODUODENOSCOPY) N/A 2014    Performed by Vonda [facility]jias MD at [facility] ENDO    FOOT SURGERY Right     IMAGING PROCEDURE, GI TRACT, INTRALUMINAL, VIA CAPSULE N/A 3/5/2014    Performed by Vonda [facility]jias MD at [facility] ENDO    INJECTION, BOTULINUM TOXIN, TYPE A N/A 2019    Performed by Pollo Park MD at [facility] OR 2ND FLR    REPAIR-HAMMER TOE Right 2016    Performed by [facility] at [facility] OR    REPAIR-HAMMER " TOE Right 6/25/2014    [facility]formed [facility] at [facility] OR       Family History   Problem Relation Age of Onset    Hypertension Mother     Breast cancer Neg Hx     Colon cancer Neg Hx     Ovarian cancer Neg Hx     Hyperlipidemia Neg Hx     Heart disease Neg Hx     Diabetes Neg Hx     Cancer Neg Hx     Arthritis Neg Hx        Social History     Socioeconomic History    Marital status: Single     Spouse name: Not on file    Number of children: Not on file    Years of education: Not on file    Highest education level: Not on file   Occupational History    Not on file   Social Needs    Financial resource strain: Not on file    Food insecurity:     Worry: Not on file     Inability: Not on file    Transportation needs:     Medical: Not on file     Non-medical: Not on file   Tobacco Use    Smoking status: Never Smoker    Smokeless tobacco: Never Used   Substance and Sexual Activity    Alcohol use: Yes     Comment: occasional    Drug use: No    Sexual activity: Yes     Partners: Male     Birth control/protection: None   Lifestyle    Physical activity:     Days per week: Not on file     Minutes per session: Not on file    Stress: Not on file   Relationships    Social connections:     Talks on phone: Not on file     Gets together: Not on file     Attends Druze service: Not on file     Active member of club or organization: Not on file     Attends meetings of clubs or organizations: Not on file     Relationship status: Not on file   Other Topics Concern    Not on file   Social History Narrative    Not on file       Current Outpatient Medications   Medication Sig Dispense Refill    ASHLYNA 0.15 mg-30 mcg (84)/10 mcg (7) 3MPk TAKE 1 TABLET BY MOUTH EVERY DAY 28 each 2    FLUCELVAX QUAD 9988-3432, PF, 60 mcg (15 mcg x 4)/0.5 mL Syrg vaccine TO BE ADMINISTERED BY PHARMACIST FOR IMMUNIZATION  0    neomycin-polymyxin-dexamethasone (DEXACINE) 3.5 mg/g-10,000 unit/g-0.1 % Oint 3 (three)  times daily.      olopatadine (PATADAY) 0.2 % Drop Place 1 drop into both eyes once daily. 2.5 mL 0    imipramine (TOFRANIL) 25 MG tablet Take 1 tablet (25 mg total) by mouth 3 (three) times daily. 90 tablet 11     No current facility-administered medications for this visit.        Review of patient's allergies indicates:  No Known Allergies      Review of Systems   Constitution: Negative for chills, fever and malaise/fatigue.   HENT: Negative for congestion.    Cardiovascular: Negative for chest pain, claudication and leg swelling.   Respiratory: Negative for cough and shortness of breath.    Skin: Negative for color change, itching, poor wound healing and rash.   Musculoskeletal: Negative for back pain, joint pain, muscle cramps and muscle weakness.   Gastrointestinal: Negative for nausea and vomiting.   Neurological: Negative for numbness, paresthesias and weakness.   Psychiatric/Behavioral: Negative for altered mental status.           Objective:      Physical Exam   Constitutional: She is oriented to person, place, and time. She appears well-developed and well-nourished. No distress.   Cardiovascular: Intact distal pulses.   Pulses:       Dorsalis pedis pulses are 2+ on the right side, and 2+ on the left side.        Posterior tibial pulses are 2+ on the right side, and 2+ on the left side.   Musculoskeletal:   Mild reducible flexion contracture of the PIPJ and DIPJ left 3rd toe with palpable bony prominence dorsal lateral 3rd toe DIPJ..  Reducible flexion adductovarus contracture of the PIPJ and DIPJ left 4th toe.  Mild reducible adductovarus contracture the 5th toe with small tailor's bunion noted left foot.    Supinated foot structure bilateral foot.   Neurological: She is alert and oriented to person, place, and time. She has normal strength. No sensory deficit. Gait normal.   Skin: Skin is warm, dry and intact. Capillary refill takes less than 2 seconds. No ecchymosis and no rash noted. She is not  diaphoretic. No cyanosis or erythema. No pallor. Nails show no clubbing.   No open lesions macerations bilateral foot.    Slight hyperkeratosis a skin overlying dorsal lateral left 3rd toe DIPJ.             Assessment:       Encounter Diagnosis   Name Primary?    Acquired curly toe, left Yes         Plan:       Umer was seen today for follow-up.    Diagnoses and all orders for this visit:    Acquired curly toe, left      I counseled the patient on her conditions, their implications and medical management.    Reviewed left foot x-ray in detail the patient. No significant arthrosis appreciated.  Note the adductovarus appearance of the left 4th toe.  Left 3rd toe appears relatively well aligned.    Clinically discussed that her deformities are reducible to the left foot.  May consider percutaneous flexor tenotomy of toes 3 and 4 versus DIPJ arthroplasty of the left 3rd toe and proximal interphalangeal joint arthrodesis of the 4th toe and flexor tenotomy or DIPJ arthroplasty. Associated risks, benefits and postop course discussed. No guarantees given or implied.      Patient like to consider options at this time and return p.r.n..       Also discussed importance of wearing shoes with adequate room in the toe box or to prevent toe irritation.  She inspection today reveals that the tennis shoe was a little short and possibly causing irritation over her 3rd and 4th toes.    .

## 2019-10-22 ENCOUNTER — PATIENT MESSAGE (OUTPATIENT)
Dept: PODIATRY | Facility: CLINIC | Age: 39
End: 2019-10-22

## 2019-11-08 ENCOUNTER — PATIENT MESSAGE (OUTPATIENT)
Dept: FAMILY MEDICINE | Facility: CLINIC | Age: 39
End: 2019-11-08

## 2019-12-30 ENCOUNTER — PATIENT OUTREACH (OUTPATIENT)
Dept: ADMINISTRATIVE | Facility: OTHER | Age: 39
End: 2019-12-30

## 2020-01-01 ENCOUNTER — PATIENT MESSAGE (OUTPATIENT)
Dept: PODIATRY | Facility: CLINIC | Age: 40
End: 2020-01-01

## 2020-01-27 ENCOUNTER — PATIENT MESSAGE (OUTPATIENT)
Dept: PODIATRY | Facility: CLINIC | Age: 40
End: 2020-01-27

## 2020-03-11 ENCOUNTER — PATIENT OUTREACH (OUTPATIENT)
Dept: ADMINISTRATIVE | Facility: OTHER | Age: 40
End: 2020-03-11

## 2020-03-16 ENCOUNTER — OFFICE VISIT (OUTPATIENT)
Dept: PODIATRY | Facility: CLINIC | Age: 40
End: 2020-03-16
Payer: MEDICAID

## 2020-03-16 VITALS — BODY MASS INDEX: 32.1 KG/M2 | HEIGHT: 61 IN | WEIGHT: 170 LBS

## 2020-03-16 DIAGNOSIS — M20.5X2 ACQUIRED MALLET TOE, LEFT: Primary | ICD-10-CM

## 2020-03-16 DIAGNOSIS — M20.5X2 ACQUIRED CURLY TOE, LEFT: ICD-10-CM

## 2020-03-16 PROCEDURE — 99213 PR OFFICE/OUTPT VISIT, EST, LEVL III, 20-29 MIN: ICD-10-PCS | Mod: S$PBB,,, | Performed by: PODIATRIST

## 2020-03-16 PROCEDURE — 99999 PR PBB SHADOW E&M-EST. PATIENT-LVL III: ICD-10-PCS | Mod: PBBFAC,,, | Performed by: PODIATRIST

## 2020-03-16 PROCEDURE — 99213 OFFICE O/P EST LOW 20 MIN: CPT | Mod: S$PBB,,, | Performed by: PODIATRIST

## 2020-03-16 PROCEDURE — 99999 PR PBB SHADOW E&M-EST. PATIENT-LVL III: CPT | Mod: PBBFAC,,, | Performed by: PODIATRIST

## 2020-03-16 PROCEDURE — 99213 OFFICE O/P EST LOW 20 MIN: CPT | Mod: PBBFAC,PN | Performed by: PODIATRIST

## 2020-03-16 NOTE — PATIENT INSTRUCTIONS
Discussed postponing any elective surgeries until after COVID-19 crisis is better controlled. Discussed release of contracted tendon on left fourth toe and arthroplasty of fourth toe.

## 2020-03-16 NOTE — PROGRESS NOTES
"Subjective:      Patient ID: Umer Rodriguez is a 39 y.o. female.    Chief Complaint: Foot Pain (left foot third toe )    Presents complaining of developing hammertoe deformities to left foot.  History of previous hammertoe correction toes 3-5 right foot. Denies any trauma or changes in activity or shoe gear.  Pain only in enclosed shoes.  Relates the top of her shoe rubs up against toes 3-5 left foot.    2019:  Follow-up from left foot x-ray.  As the previous exam she complains mostly of discomfort overlying the distal left 3rd toe and sometimes the 4th toe in enclosed shoes.  Relatively symptomatic in open-toed shoes.  Denies any trauma.    2020:  Presents complaining pain to the left 3rd and 4th toes in certain pairs of enclosed shoes.  Otherwise she relates she is asymptomatic.  She is inquiring about surgical intervention today.    Vitals:    20 1048   Weight: 77.1 kg (170 lb)   Height: 5' 1" (1.549 m)   PainSc:   2      Past Medical History:   Diagnosis Date    Allergy     Fibroids     ARNOL (iron deficiency anemia) 2014       Past Surgical History:   Procedure Laterality Date     SECTION  ,     CYSTOSCOPY N/A 2019    Procedure: CYSTOSCOPY;  Surgeon: Pollo Park MD;  Location: [facility] OR [address] [place] [address];  Service: Urology;  Laterality: N/A;  30 min/2nd floor OR    FOOT SURGERY Right     INJECTION OF BOTULINUM TOXIN TYPE A N/A 2019    Procedure: INJECTION, BOTULINUM TOXIN, TYPE A;  Surgeon: Pollo Park MD;  Location: [facility] OR [address] [place] [address];  Service: Urology;  Laterality: N/A;       Family History   Problem Relation Age of Onset    Hypertension Mother     Breast cancer Neg Hx     Colon cancer Neg Hx     Ovarian cancer Neg Hx     Hyperlipidemia Neg Hx     Heart disease Neg Hx     Diabetes Neg Hx     Cancer Neg Hx     Arthritis Neg Hx        Social History     Socioeconomic History    Marital status: Single     Spouse name: Not on file    Number of " children: Not on file    Years of education: Not on file    Highest education level: Not on file   Occupational History    Not on file   Social Needs    Financial resource strain: Not on file    Food insecurity:     Worry: Not on file     Inability: Not on file    Transportation needs:     Medical: Not on file     Non-medical: Not on file   Tobacco Use    Smoking status: Never Smoker    Smokeless tobacco: Never Used   Substance and Sexual Activity    Alcohol use: Yes     Comment: occasional    Drug use: No    Sexual activity: Yes     Partners: Male     Birth control/protection: None   Lifestyle    Physical activity:     Days per week: Not on file     Minutes per session: Not on file    Stress: Not on file   Relationships    Social connections:     Talks on phone: Not on file     Gets together: Not on file     Attends Voodoo service: Not on file     Active member of club or organization: Not on file     Attends meetings of clubs or organizations: Not on file     Relationship status: Not on file   Other Topics Concern    Not on file   Social History Narrative    Not on file       Current Outpatient Medications   Medication Sig Dispense Refill    FLUCELVAX QUAD 8429-7879, PF, 60 mcg (15 mcg x 4)/0.5 mL Syrg vaccine TO BE ADMINISTERED BY PHARMACIST FOR IMMUNIZATION  0    neomycin-polymyxin-dexamethasone (DEXACINE) 3.5 mg/g-10,000 unit/g-0.1 % Oint 3 (three) times daily.      olopatadine (PATADAY) 0.2 % Drop Place 1 drop into both eyes once daily. 2.5 mL 0    ASHLYNA 0.15 mg-30 mcg (84)/10 mcg (7) 3MPk TAKE 1 TABLET BY MOUTH EVERY DAY 28 each 2    imipramine (TOFRANIL) 25 MG tablet Take 1 tablet (25 mg total) by mouth 3 (three) times daily. 90 tablet 11     No current facility-administered medications for this visit.        Review of patient's allergies indicates:  No Known Allergies      Review of Systems   Constitution: Negative for chills, fever and malaise/fatigue.   HENT: Negative for  congestion.    Cardiovascular: Negative for chest pain, claudication and leg swelling.   Respiratory: Negative for cough and shortness of breath.    Skin: Negative for color change, itching, poor wound healing and rash.   Musculoskeletal: Negative for back pain, joint pain, muscle cramps and muscle weakness.   Gastrointestinal: Negative for nausea and vomiting.   Neurological: Negative for numbness, paresthesias and weakness.   Psychiatric/Behavioral: Negative for altered mental status.           Objective:      Physical Exam   Constitutional: She is oriented to person, place, and time. She appears well-developed and well-nourished. No distress.   Cardiovascular: Intact distal pulses.   Pulses:       Dorsalis pedis pulses are 2+ on the right side, and 2+ on the left side.        Posterior tibial pulses are 2+ on the right side, and 2+ on the left side.   Musculoskeletal:   Mild reducible flexion contracture of the PIPJ and DIPJ left 3rd toe with palpable bony prominence dorsal lateral 3rd toe DIPJ.  Reducible flexion adductovarus contracture of the PIPJ and DIPJ left 4th toe.  Mild reducible adductovarus contracture the 5th toe with small tailor's bunion noted left foot.    Supinated foot structure bilateral foot.   Neurological: She is alert and oriented to person, place, and time. She has normal strength. No sensory deficit. Gait normal.   Skin: Skin is warm, dry and intact. Capillary refill takes less than 2 seconds. No ecchymosis and no rash noted. She is not diaphoretic. No cyanosis or erythema. No pallor. Nails show no clubbing.   No open lesions macerations bilateral foot.    Slight hyperkeratosis a skin overlying dorsal lateral left 3rd toe DIPJ.             Assessment:       Encounter Diagnoses   Name Primary?    Acquired mallet toe, left Yes    Acquired curly toe, left          Plan:       Umer was seen today for foot pain.    Diagnoses and all orders for this visit:    Acquired mallet toe,  left    Acquired curly toe, left      I counseled the patient on her conditions, their implications and medical management.    Reviewed left foot x-ray in detail the patient. No significant arthrosis appreciated.  Note the adductovarus appearance of the left 4th toe.  Left 3rd toe appears relatively well aligned.    Clinically discussed that her deformities are reducible to the left foot.  Consider percutaneous flexor tenotomy of toes 3 and 4 versus DIPJ arthroplasty of the left 3rd and 4th toes. Associated risks, benefits and postop course discussed. No guarantees given or implied.   We discussed postpone any surgical intervention until the COVID 19 crisis has been stabilized/everted.      Also discussed importance of wearing shoes with adequate room in the toe box or to prevent toe irritation.  She inspection today reveals that the tennis shoe was a little short and possibly causing irritation over her 3rd and 4th toes.      RTC p.r.n. As discussed.    .

## 2020-04-05 ENCOUNTER — PATIENT MESSAGE (OUTPATIENT)
Dept: FAMILY MEDICINE | Facility: CLINIC | Age: 40
End: 2020-04-05

## 2020-04-05 DIAGNOSIS — Z30.41 ENCOUNTER FOR SURVEILLANCE OF CONTRACEPTIVE PILLS: ICD-10-CM

## 2020-04-06 RX ORDER — LEVONORGESTREL / ETHINYL ESTRADIOL AND ETHINYL ESTRADIOL 150-30(84)
1 KIT ORAL DAILY
Qty: 28 EACH | Refills: 2 | Status: SHIPPED | OUTPATIENT
Start: 2020-04-06 | End: 2021-01-25 | Stop reason: SDUPTHER

## 2020-11-03 ENCOUNTER — PATIENT MESSAGE (OUTPATIENT)
Dept: FAMILY MEDICINE | Facility: CLINIC | Age: 40
End: 2020-11-03

## 2020-11-08 ENCOUNTER — PATIENT MESSAGE (OUTPATIENT)
Dept: FAMILY MEDICINE | Facility: CLINIC | Age: 40
End: 2020-11-08

## 2020-11-09 NOTE — PROGRESS NOTES
Patient ID: Umer Rodriguez is a pleasant 40 y.o. Black or  female.    Chief Complaint: Hand Pain      Patient is a new pt to me but has been seen by AUDELIA Doss, last visit on 2019.    This visit is a Telemedicine Video Visit due to the COVID-19 epidemics.    The patient location is: [facility]  The chief complaint leading to consultation is: finger swelling    Visit type: audiovisual    Face to Face time with patient: 10 min  15 minutes of total time spent on the encounter, which includes face to face time and non-face to face time preparing to see the patient (eg, review of tests), Obtaining and/or reviewing separately obtained history, Documenting clinical information in the electronic or other health record, Independently interpreting results (not separately reported) and communicating results to the patient/family/caregiver, or Care coordination (not separately reported).         Each patient to whom he or she provides medical services by telemedicine is:  (1) informed of the relationship between the physician and patient and the respective role of any other health care provider with respect to management of the patient; and (2) notified that he or she may decline to receive medical services by telemedicine and may withdraw from such care at any time.    HPI     She reports presenting from Friday pain and swelling of the median aspect of the 3 the R middle finger perionychial area. It started after her using her hand to comb her wet hair? She did not have her nails done. First episode. She tried to soak her finger, and to apply some heat on this finger.  She denies any redness of the finger, no swelling, no fever.    Patient Active Problem List   Diagnosis    Uterine fibroids affecting pregnancy    Previous cesareal delivery, declines  - Repeat c/s at 39 weeks    Short interval between pregnancies complicating pregnancy, antepartum    Abdominal pain, epigastric    ARNOL (iron  deficiency anemia)    Hammertoe    Toe contracture    Painful orthopaedic hardware    Short toes    Flail toe    Hypertrophic scar    Spina bifida of lumbosacral region without hydrocephalus    LENA (stress urinary incontinence, female)    Tethered spinal cord    Decreased bladder capacity    Detrusor instability    Urge incontinence        Review of Systems   Constitutional: Negative for activity change and unexpected weight change.   HENT: Negative for hearing loss, rhinorrhea and trouble swallowing.    Eyes: Negative for discharge and visual disturbance.   Respiratory: Negative for chest tightness and wheezing.    Cardiovascular: Negative for chest pain and palpitations.   Gastrointestinal: Negative for blood in stool, constipation, diarrhea and vomiting.   Endocrine: Negative for polydipsia and polyuria.   Genitourinary: Negative for difficulty urinating, dysuria, hematuria and menstrual problem.   Musculoskeletal: Negative for arthralgias, joint swelling and neck pain.   Skin:        Finger pain   Neurological: Negative for weakness and headaches.   Psychiatric/Behavioral: Negative for confusion and dysphoric mood.       Objective:      Physical Exam    There were no vitals filed for this visit.  There is no height or weight on file to calculate BMI.    Smiling, pleasant, NAD.  Middle finger of R hand with on median aspect paronychia, moderate, with no nail involvement, some collection in this area. No redness or swelling of the finger.  She sent picture that was taken yesterday or two days ago, that showed redness and some swelling of median aspect but with no collection at that point.    Assessment:       1. Paronychia of right middle finger        Plan:   Umer was seen today for hand pain.    Diagnoses and all orders for this visit:    Paronychia of right middle finger    See HPI and PE.  If compare the pictures she sent yesterday (taken on Sunday?)  through the Portal to what I can see during  this virtual visit, it seems that there is a small collection now that is ready to drain on its own.  Advised patient to go on applying hot compresses and to soak her finger in hot water.  I told her to restrain from squeezing the area to often and to wait at least 1 or 2 days.  She can start applying an antibiotic cream.  Advised her that if not better in 1-2 days she may want to have it I&D.  I also advised her to seek for medical attention if developing redness or swelling of the finger as well as fever or any concerning symptoms.  I sent her written material through the portal. I told her to send me updates or questions at her convenience.    Follow up if symptoms worsen or fail to improve.    This note was created by combination of typed  and M-Modal dictation.  Transcription errors may be present.  If there are any questions, please contact me.

## 2020-11-10 ENCOUNTER — OFFICE VISIT (OUTPATIENT)
Dept: FAMILY MEDICINE | Facility: CLINIC | Age: 40
End: 2020-11-10
Payer: MEDICAID

## 2020-11-10 DIAGNOSIS — L03.011 PARONYCHIA OF RIGHT MIDDLE FINGER: Primary | ICD-10-CM

## 2020-11-10 PROCEDURE — 99213 PR OFFICE/OUTPT VISIT, EST, LEVL III, 20-29 MIN: ICD-10-PCS | Mod: 95,,, | Performed by: INTERNAL MEDICINE

## 2020-11-10 PROCEDURE — 99213 OFFICE O/P EST LOW 20 MIN: CPT | Mod: 95,,, | Performed by: INTERNAL MEDICINE

## 2020-12-28 ENCOUNTER — PATIENT MESSAGE (OUTPATIENT)
Dept: FAMILY MEDICINE | Facility: CLINIC | Age: 40
End: 2020-12-28

## 2021-01-25 ENCOUNTER — LAB VISIT (OUTPATIENT)
Dept: LAB | Facility: HOSPITAL | Age: 41
End: 2021-01-25
Attending: FAMILY MEDICINE
Payer: MEDICAID

## 2021-01-25 ENCOUNTER — OFFICE VISIT (OUTPATIENT)
Dept: FAMILY MEDICINE | Facility: CLINIC | Age: 41
End: 2021-01-25
Payer: MEDICAID

## 2021-01-25 VITALS
BODY MASS INDEX: 34.92 KG/M2 | DIASTOLIC BLOOD PRESSURE: 74 MMHG | SYSTOLIC BLOOD PRESSURE: 110 MMHG | RESPIRATION RATE: 16 BRPM | HEART RATE: 68 BPM | TEMPERATURE: 98 F | HEIGHT: 61 IN | WEIGHT: 184.94 LBS | OXYGEN SATURATION: 97 %

## 2021-01-25 DIAGNOSIS — E66.9 OBESITY (BMI 30.0-34.9): ICD-10-CM

## 2021-01-25 DIAGNOSIS — Z30.41 ENCOUNTER FOR SURVEILLANCE OF CONTRACEPTIVE PILLS: ICD-10-CM

## 2021-01-25 DIAGNOSIS — Z12.31 ENCOUNTER FOR SCREENING MAMMOGRAM FOR BREAST CANCER: ICD-10-CM

## 2021-01-25 DIAGNOSIS — Z01.419 WELL FEMALE EXAM WITH ROUTINE GYNECOLOGICAL EXAM: ICD-10-CM

## 2021-01-25 DIAGNOSIS — Z01.419 WELL FEMALE EXAM WITH ROUTINE GYNECOLOGICAL EXAM: Primary | ICD-10-CM

## 2021-01-25 PROBLEM — N39.41 URGE INCONTINENCE: Status: RESOLVED | Noted: 2019-02-20 | Resolved: 2021-01-25

## 2021-01-25 PROBLEM — N39.3 SUI (STRESS URINARY INCONTINENCE, FEMALE): Status: RESOLVED | Noted: 2018-08-07 | Resolved: 2021-01-25

## 2021-01-25 PROBLEM — L91.0 HYPERTROPHIC SCAR: Status: RESOLVED | Noted: 2017-04-19 | Resolved: 2021-01-25

## 2021-01-25 PROBLEM — N32.89 DECREASED BLADDER CAPACITY: Status: RESOLVED | Noted: 2018-08-21 | Resolved: 2021-01-25

## 2021-01-25 PROBLEM — Q06.8 TETHERED SPINAL CORD: Status: RESOLVED | Noted: 2018-08-07 | Resolved: 2021-01-25

## 2021-01-25 LAB
ALBUMIN SERPL BCP-MCNC: 3.4 G/DL (ref 3.5–5.2)
ALP SERPL-CCNC: 60 U/L (ref 55–135)
ALT SERPL W/O P-5'-P-CCNC: 21 U/L (ref 10–44)
ANION GAP SERPL CALC-SCNC: 8 MMOL/L (ref 8–16)
AST SERPL-CCNC: 18 U/L (ref 10–40)
BASOPHILS # BLD AUTO: 0.04 K/UL (ref 0–0.2)
BASOPHILS NFR BLD: 0.6 % (ref 0–1.9)
BILIRUB SERPL-MCNC: 1 MG/DL (ref 0.1–1)
BUN SERPL-MCNC: 13 MG/DL (ref 6–20)
CALCIUM SERPL-MCNC: 8.9 MG/DL (ref 8.7–10.5)
CHLORIDE SERPL-SCNC: 108 MMOL/L (ref 95–110)
CHOLEST SERPL-MCNC: 217 MG/DL (ref 120–199)
CHOLEST/HDLC SERPL: 4.8 {RATIO} (ref 2–5)
CO2 SERPL-SCNC: 23 MMOL/L (ref 23–29)
CREAT SERPL-MCNC: 0.7 MG/DL (ref 0.5–1.4)
DIFFERENTIAL METHOD: NORMAL
EOSINOPHIL # BLD AUTO: 0.1 K/UL (ref 0–0.5)
EOSINOPHIL NFR BLD: 1.9 % (ref 0–8)
ERYTHROCYTE [DISTWIDTH] IN BLOOD BY AUTOMATED COUNT: 12.6 % (ref 11.5–14.5)
EST. GFR  (AFRICAN AMERICAN): >60 ML/MIN/1.73 M^2
EST. GFR  (NON AFRICAN AMERICAN): >60 ML/MIN/1.73 M^2
GLUCOSE SERPL-MCNC: 87 MG/DL (ref 70–110)
HCT VFR BLD AUTO: 44.9 % (ref 37–48.5)
HDLC SERPL-MCNC: 45 MG/DL (ref 40–75)
HDLC SERPL: 20.7 % (ref 20–50)
HGB BLD-MCNC: 14.7 G/DL (ref 12–16)
IMM GRANULOCYTES # BLD AUTO: 0.02 K/UL (ref 0–0.04)
IMM GRANULOCYTES NFR BLD AUTO: 0.3 % (ref 0–0.5)
LDLC SERPL CALC-MCNC: 149.4 MG/DL (ref 63–159)
LYMPHOCYTES # BLD AUTO: 2.3 K/UL (ref 1–4.8)
LYMPHOCYTES NFR BLD: 33.4 % (ref 18–48)
MCH RBC QN AUTO: 30.2 PG (ref 27–31)
MCHC RBC AUTO-ENTMCNC: 32.7 G/DL (ref 32–36)
MCV RBC AUTO: 92 FL (ref 82–98)
MONOCYTES # BLD AUTO: 0.5 K/UL (ref 0.3–1)
MONOCYTES NFR BLD: 7.9 % (ref 4–15)
NEUTROPHILS # BLD AUTO: 3.8 K/UL (ref 1.8–7.7)
NEUTROPHILS NFR BLD: 55.9 % (ref 38–73)
NONHDLC SERPL-MCNC: 172 MG/DL
NRBC BLD-RTO: 0 /100 WBC
PLATELET # BLD AUTO: 250 K/UL (ref 150–350)
PMV BLD AUTO: 11.2 FL (ref 9.2–12.9)
POTASSIUM SERPL-SCNC: 4.2 MMOL/L (ref 3.5–5.1)
PROT SERPL-MCNC: 7 G/DL (ref 6–8.4)
RBC # BLD AUTO: 4.87 M/UL (ref 4–5.4)
SODIUM SERPL-SCNC: 139 MMOL/L (ref 136–145)
TRIGL SERPL-MCNC: 113 MG/DL (ref 30–150)
WBC # BLD AUTO: 6.74 K/UL (ref 3.9–12.7)

## 2021-01-25 PROCEDURE — 80061 LIPID PANEL: CPT

## 2021-01-25 PROCEDURE — 99214 OFFICE O/P EST MOD 30 MIN: CPT | Mod: PBBFAC,PO | Performed by: FAMILY MEDICINE

## 2021-01-25 PROCEDURE — 99396 PREV VISIT EST AGE 40-64: CPT | Mod: S$PBB,,, | Performed by: FAMILY MEDICINE

## 2021-01-25 PROCEDURE — 86803 HEPATITIS C AB TEST: CPT

## 2021-01-25 PROCEDURE — 88175 CYTOPATH C/V AUTO FLUID REDO: CPT

## 2021-01-25 PROCEDURE — 87624 HPV HI-RISK TYP POOLED RSLT: CPT

## 2021-01-25 PROCEDURE — 80053 COMPREHEN METABOLIC PANEL: CPT

## 2021-01-25 PROCEDURE — 99999 PR PBB SHADOW E&M-EST. PATIENT-LVL IV: ICD-10-PCS | Mod: PBBFAC,,, | Performed by: FAMILY MEDICINE

## 2021-01-25 PROCEDURE — 99396 PR PREVENTIVE VISIT,EST,40-64: ICD-10-PCS | Mod: S$PBB,,, | Performed by: FAMILY MEDICINE

## 2021-01-25 PROCEDURE — 99999 PR PBB SHADOW E&M-EST. PATIENT-LVL IV: CPT | Mod: PBBFAC,,, | Performed by: FAMILY MEDICINE

## 2021-01-25 PROCEDURE — 36415 COLL VENOUS BLD VENIPUNCTURE: CPT | Mod: PO

## 2021-01-25 PROCEDURE — 85025 COMPLETE CBC W/AUTO DIFF WBC: CPT

## 2021-01-25 RX ORDER — LEVONORGESTREL / ETHINYL ESTRADIOL AND ETHINYL ESTRADIOL 150-30(84)
1 KIT ORAL DAILY
Qty: 1 EACH | Refills: 3 | Status: SHIPPED | OUTPATIENT
Start: 2021-01-25 | End: 2021-04-26 | Stop reason: SDUPTHER

## 2021-01-26 LAB — HCV AB SERPL QL IA: NEGATIVE

## 2021-01-29 LAB
HPV HR 12 DNA SPEC QL NAA+PROBE: NEGATIVE
HPV16 AG SPEC QL: NEGATIVE
HPV18 DNA SPEC QL NAA+PROBE: NEGATIVE

## 2021-02-22 LAB
FINAL PATHOLOGIC DIAGNOSIS: NORMAL
Lab: NORMAL

## 2021-03-05 ENCOUNTER — HOSPITAL ENCOUNTER (OUTPATIENT)
Dept: RADIOLOGY | Facility: OTHER | Age: 41
Discharge: HOME | End: 2021-03-05
Attending: FAMILY MEDICINE
Payer: MEDICAID

## 2021-03-05 DIAGNOSIS — Z12.31 ENCOUNTER FOR SCREENING MAMMOGRAM FOR BREAST CANCER: ICD-10-CM

## 2021-03-05 PROCEDURE — 77067 SCR MAMMO BI INCL CAD: CPT | Mod: 26,,, | Performed by: RADIOLOGY

## 2021-03-05 PROCEDURE — 77067 SCR MAMMO BI INCL CAD: CPT | Mod: TC

## 2021-03-05 PROCEDURE — 77063 BREAST TOMOSYNTHESIS BI: CPT | Mod: 26,,, | Performed by: RADIOLOGY

## 2021-03-05 PROCEDURE — 77067 MAMMO DIGITAL SCREENING BILAT WITH TOMO: ICD-10-PCS | Mod: 26,,, | Performed by: RADIOLOGY

## 2021-03-05 PROCEDURE — 77063 MAMMO DIGITAL SCREENING BILAT WITH TOMO: ICD-10-PCS | Mod: 26,,, | Performed by: RADIOLOGY

## 2021-03-17 ENCOUNTER — PATIENT MESSAGE (OUTPATIENT)
Dept: FAMILY MEDICINE | Facility: CLINIC | Age: 41
End: 2021-03-17

## 2021-04-26 ENCOUNTER — PATIENT MESSAGE (OUTPATIENT)
Dept: FAMILY MEDICINE | Facility: CLINIC | Age: 41
End: 2021-04-26

## 2021-04-26 DIAGNOSIS — Z30.41 ENCOUNTER FOR SURVEILLANCE OF CONTRACEPTIVE PILLS: ICD-10-CM

## 2021-04-26 RX ORDER — LEVONORGESTREL / ETHINYL ESTRADIOL AND ETHINYL ESTRADIOL 150-30(84)
1 KIT ORAL DAILY
Qty: 1 EACH | Refills: 7 | Status: SHIPPED | OUTPATIENT
Start: 2021-04-26 | End: 2022-07-18 | Stop reason: SDUPTHER

## 2021-04-26 RX ORDER — HYDROXYZINE PAMOATE 25 MG/1
25 CAPSULE ORAL NIGHTLY
Qty: 30 CAPSULE | Refills: 2 | Status: SHIPPED | OUTPATIENT
Start: 2021-04-26 | End: 2022-10-25

## 2021-04-28 ENCOUNTER — PATIENT MESSAGE (OUTPATIENT)
Dept: FAMILY MEDICINE | Facility: CLINIC | Age: 41
End: 2021-04-28

## 2021-05-05 ENCOUNTER — TELEPHONE (OUTPATIENT)
Dept: OBSTETRICS AND GYNECOLOGY | Facility: CLINIC | Age: 41
End: 2021-05-05

## 2021-11-04 ENCOUNTER — PATIENT OUTREACH (OUTPATIENT)
Dept: ADMINISTRATIVE | Facility: OTHER | Age: 41
End: 2021-11-04
Payer: MEDICAID

## 2021-11-05 ENCOUNTER — OFFICE VISIT (OUTPATIENT)
Dept: OBSTETRICS AND GYNECOLOGY | Facility: CLINIC | Age: 41
End: 2021-11-05
Payer: MEDICAID

## 2021-11-05 VITALS
BODY MASS INDEX: 34.96 KG/M2 | DIASTOLIC BLOOD PRESSURE: 72 MMHG | WEIGHT: 185.19 LBS | SYSTOLIC BLOOD PRESSURE: 120 MMHG | HEIGHT: 61 IN

## 2021-11-05 DIAGNOSIS — R82.90 ABNORMAL URINALYSIS: ICD-10-CM

## 2021-11-05 DIAGNOSIS — N92.0 MENORRHAGIA WITH REGULAR CYCLE: Primary | ICD-10-CM

## 2021-11-05 PROCEDURE — 99999 PR PBB SHADOW E&M-EST. PATIENT-LVL III: CPT | Mod: PBBFAC,,, | Performed by: OBSTETRICS & GYNECOLOGY

## 2021-11-05 PROCEDURE — 99999 PR PBB SHADOW E&M-EST. PATIENT-LVL III: ICD-10-PCS | Mod: PBBFAC,,, | Performed by: OBSTETRICS & GYNECOLOGY

## 2021-11-05 PROCEDURE — 1160F PR REVIEW ALL MEDS BY PRESCRIBER/CLIN PHARMACIST DOCUMENTED: ICD-10-PCS | Mod: CPTII,,, | Performed by: OBSTETRICS & GYNECOLOGY

## 2021-11-05 PROCEDURE — 1159F MED LIST DOCD IN RCRD: CPT | Mod: CPTII,,, | Performed by: OBSTETRICS & GYNECOLOGY

## 2021-11-05 PROCEDURE — 99213 PR OFFICE/OUTPT VISIT, EST, LEVL III, 20-29 MIN: ICD-10-PCS | Mod: S$PBB,,, | Performed by: OBSTETRICS & GYNECOLOGY

## 2021-11-05 PROCEDURE — 99213 OFFICE O/P EST LOW 20 MIN: CPT | Mod: S$PBB,,, | Performed by: OBSTETRICS & GYNECOLOGY

## 2021-11-05 PROCEDURE — 3008F BODY MASS INDEX DOCD: CPT | Mod: CPTII,,, | Performed by: OBSTETRICS & GYNECOLOGY

## 2021-11-05 PROCEDURE — 87077 CULTURE AEROBIC IDENTIFY: CPT | Performed by: OBSTETRICS & GYNECOLOGY

## 2021-11-05 PROCEDURE — 3008F PR BODY MASS INDEX (BMI) DOCUMENTED: ICD-10-PCS | Mod: CPTII,,, | Performed by: OBSTETRICS & GYNECOLOGY

## 2021-11-05 PROCEDURE — 3074F SYST BP LT 130 MM HG: CPT | Mod: CPTII,,, | Performed by: OBSTETRICS & GYNECOLOGY

## 2021-11-05 PROCEDURE — 3078F DIAST BP <80 MM HG: CPT | Mod: CPTII,,, | Performed by: OBSTETRICS & GYNECOLOGY

## 2021-11-05 PROCEDURE — 87186 SC STD MICRODIL/AGAR DIL: CPT | Performed by: OBSTETRICS & GYNECOLOGY

## 2021-11-05 PROCEDURE — 87088 URINE BACTERIA CULTURE: CPT | Performed by: OBSTETRICS & GYNECOLOGY

## 2021-11-05 PROCEDURE — 87086 URINE CULTURE/COLONY COUNT: CPT | Performed by: OBSTETRICS & GYNECOLOGY

## 2021-11-05 PROCEDURE — 3074F PR MOST RECENT SYSTOLIC BLOOD PRESSURE < 130 MM HG: ICD-10-PCS | Mod: CPTII,,, | Performed by: OBSTETRICS & GYNECOLOGY

## 2021-11-05 PROCEDURE — 1159F PR MEDICATION LIST DOCUMENTED IN MEDICAL RECORD: ICD-10-PCS | Mod: CPTII,,, | Performed by: OBSTETRICS & GYNECOLOGY

## 2021-11-05 PROCEDURE — 1160F RVW MEDS BY RX/DR IN RCRD: CPT | Mod: CPTII,,, | Performed by: OBSTETRICS & GYNECOLOGY

## 2021-11-05 PROCEDURE — 99213 OFFICE O/P EST LOW 20 MIN: CPT | Mod: PBBFAC | Performed by: OBSTETRICS & GYNECOLOGY

## 2021-11-05 PROCEDURE — 3078F PR MOST RECENT DIASTOLIC BLOOD PRESSURE < 80 MM HG: ICD-10-PCS | Mod: CPTII,,, | Performed by: OBSTETRICS & GYNECOLOGY

## 2021-11-08 LAB — BACTERIA UR CULT: ABNORMAL

## 2021-11-09 ENCOUNTER — PATIENT MESSAGE (OUTPATIENT)
Dept: OBSTETRICS AND GYNECOLOGY | Facility: CLINIC | Age: 41
End: 2021-11-09
Payer: MEDICAID

## 2021-11-10 ENCOUNTER — PATIENT MESSAGE (OUTPATIENT)
Dept: OBSTETRICS AND GYNECOLOGY | Facility: CLINIC | Age: 41
End: 2021-11-10
Payer: MEDICAID

## 2021-11-11 RX ORDER — NITROFURANTOIN 25; 75 MG/1; MG/1
100 CAPSULE ORAL 2 TIMES DAILY
Qty: 14 CAPSULE | Refills: 0 | Status: SHIPPED | OUTPATIENT
Start: 2021-11-11 | End: 2021-11-18

## 2021-11-23 ENCOUNTER — PATIENT MESSAGE (OUTPATIENT)
Dept: OBSTETRICS AND GYNECOLOGY | Facility: CLINIC | Age: 41
End: 2021-11-23
Payer: MEDICAID

## 2021-11-24 ENCOUNTER — HOSPITAL ENCOUNTER (EMERGENCY)
Facility: OTHER | Age: 41
Discharge: HOME | End: 2021-11-24
Attending: EMERGENCY MEDICINE
Payer: MEDICAID

## 2021-11-24 VITALS
RESPIRATION RATE: 16 BRPM | DIASTOLIC BLOOD PRESSURE: 53 MMHG | HEIGHT: 61 IN | WEIGHT: 170 LBS | TEMPERATURE: 98 F | HEART RATE: 84 BPM | BODY MASS INDEX: 32.1 KG/M2 | SYSTOLIC BLOOD PRESSURE: 108 MMHG | OXYGEN SATURATION: 95 %

## 2021-11-24 DIAGNOSIS — N93.9 VAGINAL BLEEDING: Primary | ICD-10-CM

## 2021-11-24 LAB
ALBUMIN SERPL BCP-MCNC: 3.2 G/DL (ref 3.5–5.2)
ALP SERPL-CCNC: 50 U/L (ref 55–135)
ALT SERPL W/O P-5'-P-CCNC: 30 U/L (ref 10–44)
ANION GAP SERPL CALC-SCNC: 9 MMOL/L (ref 8–16)
AST SERPL-CCNC: 24 U/L (ref 10–40)
B-HCG UR QL: NEGATIVE
BASOPHILS # BLD AUTO: 0.02 K/UL (ref 0–0.2)
BASOPHILS NFR BLD: 0.2 % (ref 0–1.9)
BILIRUB SERPL-MCNC: 0.9 MG/DL (ref 0.1–1)
BUN SERPL-MCNC: 12 MG/DL (ref 6–20)
CALCIUM SERPL-MCNC: 8.3 MG/DL (ref 8.7–10.5)
CHLORIDE SERPL-SCNC: 108 MMOL/L (ref 95–110)
CO2 SERPL-SCNC: 23 MMOL/L (ref 23–29)
CREAT SERPL-MCNC: 0.9 MG/DL (ref 0.5–1.4)
CTP QC/QA: YES
DIFFERENTIAL METHOD: ABNORMAL
EOSINOPHIL # BLD AUTO: 0.1 K/UL (ref 0–0.5)
EOSINOPHIL NFR BLD: 0.6 % (ref 0–8)
ERYTHROCYTE [DISTWIDTH] IN BLOOD BY AUTOMATED COUNT: 12.5 % (ref 11.5–14.5)
EST. GFR  (AFRICAN AMERICAN): >60 ML/MIN/1.73 M^2
EST. GFR  (NON AFRICAN AMERICAN): >60 ML/MIN/1.73 M^2
GLUCOSE SERPL-MCNC: 137 MG/DL (ref 70–110)
HCT VFR BLD AUTO: 30.9 % (ref 37–48.5)
HCV AB SERPL QL IA: NEGATIVE
HGB BLD-MCNC: 10.1 G/DL (ref 12–16)
HIV 1+2 AB+HIV1 P24 AG SERPL QL IA: NEGATIVE
IMM GRANULOCYTES # BLD AUTO: 0.03 K/UL (ref 0–0.04)
IMM GRANULOCYTES NFR BLD AUTO: 0.4 % (ref 0–0.5)
LYMPHOCYTES # BLD AUTO: 2.2 K/UL (ref 1–4.8)
LYMPHOCYTES NFR BLD: 26.2 % (ref 18–48)
MCH RBC QN AUTO: 30.5 PG (ref 27–31)
MCHC RBC AUTO-ENTMCNC: 32.7 G/DL (ref 32–36)
MCV RBC AUTO: 93 FL (ref 82–98)
MONOCYTES # BLD AUTO: 0.5 K/UL (ref 0.3–1)
MONOCYTES NFR BLD: 6.3 % (ref 4–15)
NEUTROPHILS # BLD AUTO: 5.6 K/UL (ref 1.8–7.7)
NEUTROPHILS NFR BLD: 66.3 % (ref 38–73)
NRBC BLD-RTO: 0 /100 WBC
PLATELET # BLD AUTO: 229 K/UL (ref 150–450)
PMV BLD AUTO: 10.5 FL (ref 9.2–12.9)
POTASSIUM SERPL-SCNC: 3.9 MMOL/L (ref 3.5–5.1)
PROT SERPL-MCNC: 6.1 G/DL (ref 6–8.4)
RBC # BLD AUTO: 3.31 M/UL (ref 4–5.4)
SODIUM SERPL-SCNC: 140 MMOL/L (ref 136–145)
WBC # BLD AUTO: 8.41 K/UL (ref 3.9–12.7)

## 2021-11-24 PROCEDURE — 96360 HYDRATION IV INFUSION INIT: CPT

## 2021-11-24 PROCEDURE — 85025 COMPLETE CBC W/AUTO DIFF WBC: CPT | Performed by: EMERGENCY MEDICINE

## 2021-11-24 PROCEDURE — 99284 EMERGENCY DEPT VISIT MOD MDM: CPT | Mod: 25

## 2021-11-24 PROCEDURE — 80053 COMPREHEN METABOLIC PANEL: CPT | Performed by: EMERGENCY MEDICINE

## 2021-11-24 PROCEDURE — 87389 HIV-1 AG W/HIV-1&-2 AB AG IA: CPT | Performed by: EMERGENCY MEDICINE

## 2021-11-24 PROCEDURE — 81025 URINE PREGNANCY TEST: CPT | Performed by: EMERGENCY MEDICINE

## 2021-11-24 PROCEDURE — 25000003 PHARM REV CODE 250: Performed by: PHYSICIAN ASSISTANT

## 2021-11-24 PROCEDURE — 86803 HEPATITIS C AB TEST: CPT | Performed by: EMERGENCY MEDICINE

## 2021-11-24 RX ADMIN — SODIUM CHLORIDE 1000 ML: 0.9 INJECTION, SOLUTION INTRAVENOUS at 02:11

## 2021-12-01 ENCOUNTER — PATIENT MESSAGE (OUTPATIENT)
Dept: OBSTETRICS AND GYNECOLOGY | Facility: CLINIC | Age: 41
End: 2021-12-01
Payer: MEDICAID

## 2021-12-15 ENCOUNTER — HOSPITAL ENCOUNTER (OUTPATIENT)
Dept: RADIOLOGY | Facility: HOSPITAL | Age: 41
Discharge: HOME | End: 2021-12-15
Attending: OBSTETRICS & GYNECOLOGY
Payer: MEDICAID

## 2021-12-15 DIAGNOSIS — N92.0 MENORRHAGIA WITH REGULAR CYCLE: ICD-10-CM

## 2021-12-15 PROCEDURE — 76856 US EXAM PELVIC COMPLETE: CPT | Mod: 26,,, | Performed by: RADIOLOGY

## 2021-12-15 PROCEDURE — 76856 US PELVIS COMP WITH TRANSVAG NON-OB (XPD): ICD-10-PCS | Mod: 26,,, | Performed by: RADIOLOGY

## 2021-12-15 PROCEDURE — 76830 US PELVIS COMP WITH TRANSVAG NON-OB (XPD): ICD-10-PCS | Mod: 26,,, | Performed by: RADIOLOGY

## 2021-12-15 PROCEDURE — 76830 TRANSVAGINAL US NON-OB: CPT | Mod: 26,,, | Performed by: RADIOLOGY

## 2021-12-15 PROCEDURE — 76856 US EXAM PELVIC COMPLETE: CPT | Mod: TC

## 2021-12-21 ENCOUNTER — PROCEDURE VISIT (OUTPATIENT)
Dept: OBSTETRICS AND GYNECOLOGY | Facility: CLINIC | Age: 41
End: 2021-12-21
Payer: MEDICAID

## 2021-12-21 VITALS
BODY MASS INDEX: 34.63 KG/M2 | DIASTOLIC BLOOD PRESSURE: 80 MMHG | SYSTOLIC BLOOD PRESSURE: 122 MMHG | WEIGHT: 183.44 LBS | HEIGHT: 61 IN

## 2021-12-21 DIAGNOSIS — N92.0 MENORRHAGIA WITH REGULAR CYCLE: Primary | ICD-10-CM

## 2021-12-21 LAB
B-HCG UR QL: NEGATIVE
CTP QC/QA: YES

## 2021-12-21 PROCEDURE — 58100 BIOPSY OF UTERUS LINING: CPT | Mod: ,,, | Performed by: OBSTETRICS & GYNECOLOGY

## 2021-12-21 PROCEDURE — 81025 POCT URINE PREGNANCY: ICD-10-PCS | Mod: ,,, | Performed by: OBSTETRICS & GYNECOLOGY

## 2021-12-21 PROCEDURE — 58100 BIOPSY (GYNECOLOGICAL): ICD-10-PCS | Mod: ,,, | Performed by: OBSTETRICS & GYNECOLOGY

## 2021-12-21 PROCEDURE — 81025 URINE PREGNANCY TEST: CPT | Mod: ,,, | Performed by: OBSTETRICS & GYNECOLOGY

## 2021-12-21 PROCEDURE — 88305 TISSUE EXAM BY PATHOLOGIST: CPT | Performed by: PATHOLOGY

## 2021-12-21 PROCEDURE — 88305 TISSUE EXAM BY PATHOLOGIST: CPT | Mod: 26,,, | Performed by: PATHOLOGY

## 2021-12-21 PROCEDURE — 88305 TISSUE EXAM BY PATHOLOGIST: ICD-10-PCS | Mod: 26,,, | Performed by: PATHOLOGY

## 2021-12-22 NOTE — TELEPHONE ENCOUNTER
Patient has medicaid, cymbalta is not covered. She is scheduled for botox on 2/20/2019.  Prior auth form completed on cover my meds ref# [id number]   Detail Level: Zone Follow-Up Preamble: The following orders were made during the visit: Follow-Up (Free Text): If area persist will biopsy.

## 2021-12-29 LAB
FINAL PATHOLOGIC DIAGNOSIS: NORMAL
Lab: NORMAL

## 2021-12-30 ENCOUNTER — TELEPHONE (OUTPATIENT)
Dept: OBSTETRICS AND GYNECOLOGY | Facility: CLINIC | Age: 41
End: 2021-12-30
Payer: MEDICAID

## 2021-12-30 ENCOUNTER — PATIENT MESSAGE (OUTPATIENT)
Dept: OBSTETRICS AND GYNECOLOGY | Facility: CLINIC | Age: 41
End: 2021-12-30
Payer: MEDICAID

## 2022-01-25 ENCOUNTER — OFFICE VISIT (OUTPATIENT)
Dept: FAMILY MEDICINE | Facility: CLINIC | Age: 42
End: 2022-01-25
Payer: MEDICAID

## 2022-01-25 VITALS
WEIGHT: 186.75 LBS | HEART RATE: 78 BPM | BODY MASS INDEX: 35.26 KG/M2 | DIASTOLIC BLOOD PRESSURE: 78 MMHG | RESPIRATION RATE: 17 BRPM | SYSTOLIC BLOOD PRESSURE: 110 MMHG | TEMPERATURE: 98 F | OXYGEN SATURATION: 99 % | HEIGHT: 61 IN

## 2022-01-25 DIAGNOSIS — J32.9 SINUSITIS, UNSPECIFIED CHRONICITY, UNSPECIFIED LOCATION: Primary | ICD-10-CM

## 2022-01-25 DIAGNOSIS — Z23 NEEDS FLU SHOT: ICD-10-CM

## 2022-01-25 PROCEDURE — 1159F MED LIST DOCD IN RCRD: CPT | Mod: CPTII,,, | Performed by: PHYSICIAN ASSISTANT

## 2022-01-25 PROCEDURE — 90471 IMMUNIZATION ADMIN: CPT | Mod: PBBFAC,PO

## 2022-01-25 PROCEDURE — 3078F PR MOST RECENT DIASTOLIC BLOOD PRESSURE < 80 MM HG: ICD-10-PCS | Mod: CPTII,,, | Performed by: PHYSICIAN ASSISTANT

## 2022-01-25 PROCEDURE — 3008F BODY MASS INDEX DOCD: CPT | Mod: CPTII,,, | Performed by: PHYSICIAN ASSISTANT

## 2022-01-25 PROCEDURE — 99213 OFFICE O/P EST LOW 20 MIN: CPT | Mod: S$PBB,,, | Performed by: PHYSICIAN ASSISTANT

## 2022-01-25 PROCEDURE — 99999 PR PBB SHADOW E&M-EST. PATIENT-LVL III: ICD-10-PCS | Mod: PBBFAC,,, | Performed by: PHYSICIAN ASSISTANT

## 2022-01-25 PROCEDURE — 99213 PR OFFICE/OUTPT VISIT, EST, LEVL III, 20-29 MIN: ICD-10-PCS | Mod: S$PBB,,, | Performed by: PHYSICIAN ASSISTANT

## 2022-01-25 PROCEDURE — 3008F PR BODY MASS INDEX (BMI) DOCUMENTED: ICD-10-PCS | Mod: CPTII,,, | Performed by: PHYSICIAN ASSISTANT

## 2022-01-25 PROCEDURE — 99999 PR PBB SHADOW E&M-EST. PATIENT-LVL III: CPT | Mod: PBBFAC,,, | Performed by: PHYSICIAN ASSISTANT

## 2022-01-25 PROCEDURE — 1160F RVW MEDS BY RX/DR IN RCRD: CPT | Mod: CPTII,,, | Performed by: PHYSICIAN ASSISTANT

## 2022-01-25 PROCEDURE — 1159F PR MEDICATION LIST DOCUMENTED IN MEDICAL RECORD: ICD-10-PCS | Mod: CPTII,,, | Performed by: PHYSICIAN ASSISTANT

## 2022-01-25 PROCEDURE — 1160F PR REVIEW ALL MEDS BY PRESCRIBER/CLIN PHARMACIST DOCUMENTED: ICD-10-PCS | Mod: CPTII,,, | Performed by: PHYSICIAN ASSISTANT

## 2022-01-25 PROCEDURE — 99213 OFFICE O/P EST LOW 20 MIN: CPT | Mod: PBBFAC,PO | Performed by: PHYSICIAN ASSISTANT

## 2022-01-25 PROCEDURE — 3078F DIAST BP <80 MM HG: CPT | Mod: CPTII,,, | Performed by: PHYSICIAN ASSISTANT

## 2022-01-25 PROCEDURE — 3074F PR MOST RECENT SYSTOLIC BLOOD PRESSURE < 130 MM HG: ICD-10-PCS | Mod: CPTII,,, | Performed by: PHYSICIAN ASSISTANT

## 2022-01-25 PROCEDURE — 3074F SYST BP LT 130 MM HG: CPT | Mod: CPTII,,, | Performed by: PHYSICIAN ASSISTANT

## 2022-01-25 RX ORDER — FLUTICASONE PROPIONATE 50 MCG
1 SPRAY, SUSPENSION (ML) NASAL 2 TIMES DAILY
Qty: 16 G | Refills: 0 | Status: SHIPPED | OUTPATIENT
Start: 2022-01-25 | End: 2022-02-16

## 2022-01-25 NOTE — PROGRESS NOTES
Subjective:       Patient ID: Umer Rodriguez is a 41 y.o. female.    Chief Complaint: running nose and Cough    40 y/o females presents for rhinorrhea and cough. Pt's symptoms started 10 days ago 1/15/22 with a productive cough, sneezing, watery eyes, rhinorrhea, sinus congestion, and sore throat. Pt states she has had temporary relief with Benadryl, Theraflu, and Mucinex. Pt's symptoms are better and pt currently has a dry cough with rhinorrhea and congestion. Pt has been sleeping well. Symptoms are at a minimum in the morning and worsen throughout the day. Pt has been drinking plenty of fluids and eating well. No fever,chills CP, SOB, or chest tightness. Pt tested negative for COVID.       Cough  This is a new problem. The current episode started in the past 7 days. The problem has been waxing and waning. The problem occurs hourly. The cough is non-productive. Associated symptoms include nasal congestion, postnasal drip and rhinorrhea. Pertinent negatives include no chest pain, chills, ear congestion, ear pain, fever, headaches, heartburn, hemoptysis, myalgias, sore throat, shortness of breath, sweats, weight loss or wheezing. Nothing aggravates the symptoms. She has tried OTC cough suppressant for the symptoms. The treatment provided moderate relief. There is no history of asthma, bronchiectasis, bronchitis, COPD, emphysema, environmental allergies or pneumonia.     Review of Systems   Constitutional: Negative for chills, fatigue, fever and weight loss.   HENT: Positive for nasal congestion, postnasal drip and rhinorrhea. Negative for ear discharge, ear pain, facial swelling, mouth dryness and sore throat.    Respiratory: Positive for cough. Negative for hemoptysis, chest tightness, shortness of breath and wheezing.    Cardiovascular: Negative for chest pain.   Gastrointestinal: Negative for heartburn.   Musculoskeletal: Negative for myalgias.   Allergic/Immunologic: Negative for environmental allergies.    Neurological: Negative for weakness and headaches.         Objective:      Physical Exam  Constitutional:       Appearance: Normal appearance.   HENT:      Head: Normocephalic and atraumatic.      Right Ear: Tympanic membrane normal. No drainage or tenderness.      Left Ear: Tympanic membrane normal. No drainage or tenderness.      Nose: Congestion and rhinorrhea present.      Right Turbinates: Not enlarged.      Left Turbinates: Not enlarged.      Right Sinus: No maxillary sinus tenderness or frontal sinus tenderness.      Left Sinus: No maxillary sinus tenderness or frontal sinus tenderness.      Mouth/Throat:      Mouth: Mucous membranes are moist.      Pharynx: No pharyngeal swelling, oropharyngeal exudate or posterior oropharyngeal erythema.      Tonsils: 2+ on the right.   Cardiovascular:      Heart sounds: Normal heart sounds.   Pulmonary:      Breath sounds: Normal breath sounds.   Neurological:      Mental Status: She is alert.         Assessment:       Problem List Items Addressed This Visit    None     Visit Diagnoses     Sinusitis, unspecified chronicity, unspecified location    -  Primary    Relevant Medications    fluticasone propionate (FLONASE) 50 mcg/actuation nasal spray    Needs flu shot        Relevant Orders    Influenza - Quadrivalent *Preferred* (6 months+) (PF) (Completed)          Plan:       Umer was seen today for running nose and cough.    Diagnoses and all orders for this visit:    Sinusitis, unspecified chronicity, unspecified location  -     fluticasone propionate (FLONASE) 50 mcg/actuation nasal spray; 1 spray (50 mcg total) by Each Nostril route 2 (two) times daily.  - Stop Mucinex, benadryl, and Theraflu.   - Pt is encouraged to use OTC Robitussin or Delsym for a cough suppressant if needed. Continue drinking plenty of fluids.   -  Pt is educated to return back if symptoms worsen.     Needs flu shot  -     Influenza - Quadrivalent *Preferred* (6 months+) (PF)        I hereby  acknowledge that I am relying upon documentation authored by a PA student working under my supervision and further I hereby attest that I have verified the student documentation or findings by personally re-performing the physical exam and medical decision making activities of the Evaluation and Management service to be billed.  Karina Bryson

## 2022-01-27 ENCOUNTER — PATIENT MESSAGE (OUTPATIENT)
Dept: FAMILY MEDICINE | Facility: CLINIC | Age: 42
End: 2022-01-27
Payer: MEDICAID

## 2022-01-27 DIAGNOSIS — J20.8 ACUTE BACTERIAL BRONCHITIS: Primary | ICD-10-CM

## 2022-01-27 DIAGNOSIS — B96.89 ACUTE BACTERIAL BRONCHITIS: Primary | ICD-10-CM

## 2022-01-28 RX ORDER — PROMETHAZINE HYDROCHLORIDE AND DEXTROMETHORPHAN HYDROBROMIDE 6.25; 15 MG/5ML; MG/5ML
5 SYRUP ORAL NIGHTLY PRN
Qty: 180 ML | Refills: 0 | Status: SHIPPED | OUTPATIENT
Start: 2022-01-28 | End: 2022-02-07

## 2022-01-28 RX ORDER — AZITHROMYCIN 250 MG/1
TABLET, FILM COATED ORAL
Qty: 6 TABLET | Refills: 0 | Status: SHIPPED | OUTPATIENT
Start: 2022-01-28 | End: 2022-10-25 | Stop reason: ALTCHOICE

## 2022-05-11 DIAGNOSIS — Z12.31 OTHER SCREENING MAMMOGRAM: ICD-10-CM

## 2022-05-30 ENCOUNTER — PATIENT MESSAGE (OUTPATIENT)
Dept: ADMINISTRATIVE | Facility: HOSPITAL | Age: 42
End: 2022-05-30
Payer: MEDICAID

## 2022-07-18 ENCOUNTER — TELEPHONE (OUTPATIENT)
Dept: FAMILY MEDICINE | Facility: CLINIC | Age: 42
End: 2022-07-18
Payer: MEDICAID

## 2022-07-18 DIAGNOSIS — Z30.41 ENCOUNTER FOR SURVEILLANCE OF CONTRACEPTIVE PILLS: ICD-10-CM

## 2022-07-18 RX ORDER — LEVONORGESTREL / ETHINYL ESTRADIOL AND ETHINYL ESTRADIOL 150-30(84)
1 KIT ORAL DAILY
Qty: 1 EACH | Refills: 7 | Status: SHIPPED | OUTPATIENT
Start: 2022-07-18 | End: 2023-02-15

## 2022-07-18 NOTE — TELEPHONE ENCOUNTER
Last Office Visit Info:   The patient's last visit with Svitlana Becker MD was on 11/10/2020.    The patient's last visit in current department was on 1/25/2022.        Last CBC Results:   Lab Results   Component Value Date    WBC 8.41 11/24/2021    HGB 10.1 (L) 11/24/2021    HCT 30.9 (L) 11/24/2021     11/24/2021       Last CMP Results  Lab Results   Component Value Date     11/24/2021    K 3.9 11/24/2021     11/24/2021    CO2 23 11/24/2021    BUN 12 11/24/2021    CREATININE 0.9 11/24/2021    CALCIUM 8.3 (L) 11/24/2021    ALBUMIN 3.2 (L) 11/24/2021    AST 24 11/24/2021    ALT 30 11/24/2021       Last Lipids  Lab Results   Component Value Date    CHOL 217 (H) 01/25/2021    TRIG 113 01/25/2021    HDL 45 01/25/2021    LDLCALC 149.4 01/25/2021       Last A1C  No results found for: HGBA1C    Last TSH  Lab Results   Component Value Date    TSH 1.531 10/30/2018             Current Med Refills  Medication List with Changes/Refills   Current Medications    AZITHROMYCIN (Z-FAUSTINA) 250 MG TABLET    Take 2 pills day 1, then 1 pill day 2-5       Start Date: 1/28/2022 End Date: --    FLUTICASONE PROPIONATE (FLONASE) 50 MCG/ACTUATION NASAL SPRAY    SPRAY 1 SPRAY INTO EACH NOSTRIL TWICE A DAY       Start Date: 2/16/2022 End Date: --    HYDROXYZINE PAMOATE (VISTARIL) 25 MG CAP    Take 1 capsule (25 mg total) by mouth every evening.       Start Date: 4/26/2021 End Date: --    L NORGEST/E.ESTRADIOL-E.ESTRAD (ASHLYNA) 0.15 MG-30 MCG (84)/10 MCG (7) 3MPK    Take 1 tablet by mouth once daily.       Start Date: 4/26/2021 End Date: 5/24/2021       Order(s) placed per written order guidelines:    Please advise.

## 2022-07-25 ENCOUNTER — PATIENT MESSAGE (OUTPATIENT)
Dept: ADMINISTRATIVE | Facility: HOSPITAL | Age: 42
End: 2022-07-25
Payer: MEDICAID

## 2022-08-19 ENCOUNTER — HOSPITAL ENCOUNTER (OUTPATIENT)
Dept: RADIOLOGY | Facility: OTHER | Age: 42
Discharge: HOME | End: 2022-08-19
Attending: INTERNAL MEDICINE
Payer: COMMERCIAL

## 2022-08-19 DIAGNOSIS — Z12.31 OTHER SCREENING MAMMOGRAM: ICD-10-CM

## 2022-08-19 PROCEDURE — 77063 BREAST TOMOSYNTHESIS BI: CPT | Mod: 26,,, | Performed by: INTERNAL MEDICINE

## 2022-08-19 PROCEDURE — 77063 BREAST TOMOSYNTHESIS BI: CPT | Mod: TC

## 2022-08-19 PROCEDURE — 77063 MAMMO DIGITAL SCREENING BILAT WITH TOMO: ICD-10-PCS | Mod: 26,,, | Performed by: INTERNAL MEDICINE

## 2022-08-19 PROCEDURE — 77067 MAMMO DIGITAL SCREENING BILAT WITH TOMO: ICD-10-PCS | Mod: 26,,, | Performed by: INTERNAL MEDICINE

## 2022-08-19 PROCEDURE — 77067 SCR MAMMO BI INCL CAD: CPT | Mod: 26,,, | Performed by: INTERNAL MEDICINE

## 2022-08-26 ENCOUNTER — PATIENT MESSAGE (OUTPATIENT)
Dept: FAMILY MEDICINE | Facility: CLINIC | Age: 42
End: 2022-08-26
Payer: MEDICAID

## 2022-10-06 ENCOUNTER — PATIENT MESSAGE (OUTPATIENT)
Dept: FAMILY MEDICINE | Facility: CLINIC | Age: 42
End: 2022-10-06
Payer: MEDICAID

## 2022-10-06 DIAGNOSIS — R21 SKIN ERUPTION: Primary | ICD-10-CM

## 2022-10-07 RX ORDER — TRIAMCINOLONE ACETONIDE 1 MG/G
CREAM TOPICAL 2 TIMES DAILY
Qty: 80 G | Refills: 0 | Status: SHIPPED | OUTPATIENT
Start: 2022-10-07 | End: 2022-10-25

## 2022-10-25 ENCOUNTER — LAB VISIT (OUTPATIENT)
Dept: LAB | Facility: HOSPITAL | Age: 42
End: 2022-10-25
Attending: PHYSICIAN ASSISTANT
Payer: COMMERCIAL

## 2022-10-25 ENCOUNTER — OFFICE VISIT (OUTPATIENT)
Dept: FAMILY MEDICINE | Facility: CLINIC | Age: 42
End: 2022-10-25
Payer: COMMERCIAL

## 2022-10-25 VITALS
HEART RATE: 92 BPM | TEMPERATURE: 98 F | DIASTOLIC BLOOD PRESSURE: 60 MMHG | WEIGHT: 179 LBS | SYSTOLIC BLOOD PRESSURE: 100 MMHG | BODY MASS INDEX: 33.79 KG/M2 | RESPIRATION RATE: 16 BRPM | HEIGHT: 61 IN | OXYGEN SATURATION: 97 %

## 2022-10-25 DIAGNOSIS — Z23 NEEDS FLU SHOT: ICD-10-CM

## 2022-10-25 DIAGNOSIS — N89.8 VAGINAL DISCHARGE: ICD-10-CM

## 2022-10-25 DIAGNOSIS — Z00.00 ANNUAL PHYSICAL EXAM: ICD-10-CM

## 2022-10-25 DIAGNOSIS — Z00.00 ANNUAL PHYSICAL EXAM: Primary | ICD-10-CM

## 2022-10-25 PROCEDURE — 99213 OFFICE O/P EST LOW 20 MIN: CPT | Mod: PBBFAC,PO | Performed by: PHYSICIAN ASSISTANT

## 2022-10-25 PROCEDURE — 99396 PREV VISIT EST AGE 40-64: CPT | Mod: 25,S$GLB,, | Performed by: PHYSICIAN ASSISTANT

## 2022-10-25 PROCEDURE — 90686 IIV4 VACC NO PRSV 0.5 ML IM: CPT | Mod: S$GLB,,, | Performed by: PHYSICIAN ASSISTANT

## 2022-10-25 PROCEDURE — 1160F RVW MEDS BY RX/DR IN RCRD: CPT | Mod: CPTII,S$GLB,, | Performed by: PHYSICIAN ASSISTANT

## 2022-10-25 PROCEDURE — 90471 FLU VACCINE (QUAD) GREATER THAN OR EQUAL TO 3YO PRESERVATIVE FREE IM: ICD-10-PCS | Mod: S$GLB,,, | Performed by: PHYSICIAN ASSISTANT

## 2022-10-25 PROCEDURE — 3078F DIAST BP <80 MM HG: CPT | Mod: CPTII,S$GLB,, | Performed by: PHYSICIAN ASSISTANT

## 2022-10-25 PROCEDURE — 99999 PR PBB SHADOW E&M-EST. PATIENT-LVL III: CPT | Mod: PBBFAC,,, | Performed by: PHYSICIAN ASSISTANT

## 2022-10-25 PROCEDURE — 81514 NFCT DS BV&VAGINITIS DNA ALG: CPT | Performed by: PHYSICIAN ASSISTANT

## 2022-10-25 PROCEDURE — 1160F PR REVIEW ALL MEDS BY PRESCRIBER/CLIN PHARMACIST DOCUMENTED: ICD-10-PCS | Mod: CPTII,S$GLB,, | Performed by: PHYSICIAN ASSISTANT

## 2022-10-25 PROCEDURE — 3074F PR MOST RECENT SYSTOLIC BLOOD PRESSURE < 130 MM HG: ICD-10-PCS | Mod: CPTII,S$GLB,, | Performed by: PHYSICIAN ASSISTANT

## 2022-10-25 PROCEDURE — 3074F SYST BP LT 130 MM HG: CPT | Mod: CPTII,S$GLB,, | Performed by: PHYSICIAN ASSISTANT

## 2022-10-25 PROCEDURE — 99396 PR PREVENTIVE VISIT,EST,40-64: ICD-10-PCS | Mod: 25,S$GLB,, | Performed by: PHYSICIAN ASSISTANT

## 2022-10-25 PROCEDURE — 3044F HG A1C LEVEL LT 7.0%: CPT | Mod: CPTII,S$GLB,, | Performed by: PHYSICIAN ASSISTANT

## 2022-10-25 PROCEDURE — 36415 COLL VENOUS BLD VENIPUNCTURE: CPT | Mod: PO | Performed by: PHYSICIAN ASSISTANT

## 2022-10-25 PROCEDURE — 90471 IMMUNIZATION ADMIN: CPT | Mod: S$GLB,,, | Performed by: PHYSICIAN ASSISTANT

## 2022-10-25 PROCEDURE — 99999 PR PBB SHADOW E&M-EST. PATIENT-LVL III: ICD-10-PCS | Mod: PBBFAC,,, | Performed by: PHYSICIAN ASSISTANT

## 2022-10-25 PROCEDURE — 90686 FLU VACCINE (QUAD) GREATER THAN OR EQUAL TO 3YO PRESERVATIVE FREE IM: ICD-10-PCS | Mod: S$GLB,,, | Performed by: PHYSICIAN ASSISTANT

## 2022-10-25 PROCEDURE — 3044F PR MOST RECENT HEMOGLOBIN A1C LEVEL <7.0%: ICD-10-PCS | Mod: CPTII,S$GLB,, | Performed by: PHYSICIAN ASSISTANT

## 2022-10-25 PROCEDURE — 83036 HEMOGLOBIN GLYCOSYLATED A1C: CPT | Performed by: PHYSICIAN ASSISTANT

## 2022-10-25 PROCEDURE — 1159F MED LIST DOCD IN RCRD: CPT | Mod: CPTII,S$GLB,, | Performed by: PHYSICIAN ASSISTANT

## 2022-10-25 PROCEDURE — 80053 COMPREHEN METABOLIC PANEL: CPT | Performed by: PHYSICIAN ASSISTANT

## 2022-10-25 PROCEDURE — 85025 COMPLETE CBC W/AUTO DIFF WBC: CPT | Performed by: PHYSICIAN ASSISTANT

## 2022-10-25 PROCEDURE — 1159F PR MEDICATION LIST DOCUMENTED IN MEDICAL RECORD: ICD-10-PCS | Mod: CPTII,S$GLB,, | Performed by: PHYSICIAN ASSISTANT

## 2022-10-25 PROCEDURE — 80061 LIPID PANEL: CPT | Performed by: PHYSICIAN ASSISTANT

## 2022-10-25 PROCEDURE — 3078F PR MOST RECENT DIASTOLIC BLOOD PRESSURE < 80 MM HG: ICD-10-PCS | Mod: CPTII,S$GLB,, | Performed by: PHYSICIAN ASSISTANT

## 2022-10-25 RX ORDER — COVID-19 MOLECULAR TEST ASSAY
KIT MISCELLANEOUS
COMMUNITY
Start: 2022-07-17

## 2022-10-25 RX ORDER — PREDNISONE 20 MG/1
60 TABLET ORAL DAILY
COMMUNITY
Start: 2022-09-25

## 2022-10-25 RX ORDER — HYDROXYZINE HYDROCHLORIDE 25 MG/1
TABLET, FILM COATED ORAL
COMMUNITY
Start: 2022-09-25

## 2022-10-25 RX ORDER — IBUPROFEN 800 MG/1
800 TABLET ORAL 2 TIMES DAILY PRN
COMMUNITY
Start: 2022-09-22

## 2022-10-25 RX ORDER — CETIRIZINE HYDROCHLORIDE 10 MG/1
10 TABLET ORAL DAILY
COMMUNITY
Start: 2022-09-25

## 2022-10-25 NOTE — PROGRESS NOTES
Subjective:       Patient ID: Umer Rodriguez is a 42 y.o. female.    Chief Complaint: Gynecologic Exam, Annual Exam, and R/O yeast infection    HPI: 43 yo female presents for annual exam. Complains of vaginal discharge. White, no itching, no odor.   Exercise: just started  Diet: need improvement  Dental: utd  Eye : due  Alcohol: occasionally  Tobacco: never    Review of Systems   Constitutional:  Negative for fever.   Respiratory:  Negative for shortness of breath.    Gastrointestinal:  Negative for abdominal pain.   Genitourinary:  Positive for vaginal discharge. Negative for dysuria.   Neurological:  Negative for headaches.   Psychiatric/Behavioral:  Negative for dysphoric mood and sleep disturbance. The patient is not nervous/anxious.        Objective:      Physical Exam  Constitutional:       Appearance: Normal appearance.   HENT:      Head: Normocephalic and atraumatic.      Right Ear: Tympanic membrane normal.      Left Ear: Tympanic membrane normal.   Neck:      Thyroid: No thyroid mass or thyromegaly.   Cardiovascular:      Rate and Rhythm: Normal rate and regular rhythm.   Pulmonary:      Effort: Pulmonary effort is normal.      Breath sounds: Normal breath sounds.   Abdominal:      General: Abdomen is flat. Bowel sounds are normal.   Lymphadenopathy:      Head:      Right side of head: No submental or submandibular adenopathy.      Left side of head: No submental or submandibular adenopathy.   Neurological:      General: No focal deficit present.      Mental Status: She is alert and oriented to person, place, and time.   Psychiatric:         Mood and Affect: Mood normal.         Behavior: Behavior normal.       Assessment:       Problem List Items Addressed This Visit    None  Visit Diagnoses       Annual physical exam    -  Primary    Relevant Orders    CBC Auto Differential (Completed)    Comprehensive Metabolic Panel (Completed)    Lipid Panel (Completed)    Hemoglobin A1C (Completed)    Vaginal  discharge        Relevant Orders    Vaginosis Screen by DNA Probe    Needs flu shot        Relevant Orders    Influenza - Quadrivalent *Preferred* (6 months+) (PF) (Completed)              Plan:         Umer was seen today for gynecologic exam, annual exam and r/o yeast infection.  Diagnoses and all orders for this visit:    Annual physical exam  -     CBC Auto Differential; Future  -     Comprehensive Metabolic Panel; Future  -     Lipid Panel; Future  -     Hemoglobin A1C; Future    Vaginal discharge  -     Vaginosis Screen by DNA Probe    Needs flu shot  -     Influenza - Quadrivalent *Preferred* (6 months+) (PF)

## 2022-10-26 ENCOUNTER — PATIENT MESSAGE (OUTPATIENT)
Dept: FAMILY MEDICINE | Facility: CLINIC | Age: 42
End: 2022-10-26
Payer: MEDICAID

## 2022-10-26 LAB
ALBUMIN SERPL BCP-MCNC: 3.2 G/DL (ref 3.5–5.2)
ALP SERPL-CCNC: 58 U/L (ref 55–135)
ALT SERPL W/O P-5'-P-CCNC: 19 U/L (ref 10–44)
ANION GAP SERPL CALC-SCNC: 8 MMOL/L (ref 8–16)
AST SERPL-CCNC: 20 U/L (ref 10–40)
BASOPHILS # BLD AUTO: 0.03 K/UL (ref 0–0.2)
BASOPHILS NFR BLD: 0.4 % (ref 0–1.9)
BILIRUB SERPL-MCNC: 0.4 MG/DL (ref 0.1–1)
BUN SERPL-MCNC: 12 MG/DL (ref 6–20)
CALCIUM SERPL-MCNC: 9.3 MG/DL (ref 8.7–10.5)
CHLORIDE SERPL-SCNC: 108 MMOL/L (ref 95–110)
CHOLEST SERPL-MCNC: 191 MG/DL (ref 120–199)
CHOLEST/HDLC SERPL: 4.4 {RATIO} (ref 2–5)
CO2 SERPL-SCNC: 22 MMOL/L (ref 23–29)
CREAT SERPL-MCNC: 1 MG/DL (ref 0.5–1.4)
DIFFERENTIAL METHOD: ABNORMAL
EOSINOPHIL # BLD AUTO: 0.1 K/UL (ref 0–0.5)
EOSINOPHIL NFR BLD: 0.9 % (ref 0–8)
ERYTHROCYTE [DISTWIDTH] IN BLOOD BY AUTOMATED COUNT: 19 % (ref 11.5–14.5)
EST. GFR  (NO RACE VARIABLE): >60 ML/MIN/1.73 M^2
ESTIMATED AVG GLUCOSE: 117 MG/DL (ref 68–131)
GLUCOSE SERPL-MCNC: 82 MG/DL (ref 70–110)
HBA1C MFR BLD: 5.7 % (ref 4–5.6)
HCT VFR BLD AUTO: 31.1 % (ref 37–48.5)
HDLC SERPL-MCNC: 43 MG/DL (ref 40–75)
HDLC SERPL: 22.5 % (ref 20–50)
HGB BLD-MCNC: 8.7 G/DL (ref 12–16)
IMM GRANULOCYTES # BLD AUTO: 0.02 K/UL (ref 0–0.04)
IMM GRANULOCYTES NFR BLD AUTO: 0.2 % (ref 0–0.5)
LDLC SERPL CALC-MCNC: 130.4 MG/DL (ref 63–159)
LYMPHOCYTES # BLD AUTO: 2.2 K/UL (ref 1–4.8)
LYMPHOCYTES NFR BLD: 26.8 % (ref 18–48)
MCH RBC QN AUTO: 21.2 PG (ref 27–31)
MCHC RBC AUTO-ENTMCNC: 28 G/DL (ref 32–36)
MCV RBC AUTO: 76 FL (ref 82–98)
MONOCYTES # BLD AUTO: 0.6 K/UL (ref 0.3–1)
MONOCYTES NFR BLD: 7.6 % (ref 4–15)
NEUTROPHILS # BLD AUTO: 5.2 K/UL (ref 1.8–7.7)
NEUTROPHILS NFR BLD: 64.1 % (ref 38–73)
NONHDLC SERPL-MCNC: 148 MG/DL
NRBC BLD-RTO: 0 /100 WBC
PLATELET # BLD AUTO: 448 K/UL (ref 150–450)
PMV BLD AUTO: 10.4 FL (ref 9.2–12.9)
POTASSIUM SERPL-SCNC: 4.2 MMOL/L (ref 3.5–5.1)
PROT SERPL-MCNC: 7 G/DL (ref 6–8.4)
RBC # BLD AUTO: 4.1 M/UL (ref 4–5.4)
SODIUM SERPL-SCNC: 138 MMOL/L (ref 136–145)
TRIGL SERPL-MCNC: 88 MG/DL (ref 30–150)
WBC # BLD AUTO: 8.16 K/UL (ref 3.9–12.7)

## 2022-10-27 LAB
BACTERIAL VAGINOSIS DNA: NEGATIVE
CANDIDA GLABRATA DNA: NEGATIVE
CANDIDA KRUSEI DNA: NEGATIVE
CANDIDA RRNA VAG QL PROBE: POSITIVE
T VAGINALIS RRNA GENITAL QL PROBE: NEGATIVE

## 2022-10-28 ENCOUNTER — PATIENT MESSAGE (OUTPATIENT)
Dept: FAMILY MEDICINE | Facility: CLINIC | Age: 42
End: 2022-10-28
Payer: MEDICAID

## 2022-10-28 DIAGNOSIS — B37.31 VAGINAL YEAST INFECTION: Primary | ICD-10-CM

## 2022-10-28 RX ORDER — FLUCONAZOLE 150 MG/1
150 TABLET ORAL DAILY
Qty: 1 TABLET | Refills: 0 | Status: SHIPPED | OUTPATIENT
Start: 2022-10-28 | End: 2022-10-29

## 2022-11-17 ENCOUNTER — PATIENT MESSAGE (OUTPATIENT)
Dept: FAMILY MEDICINE | Facility: CLINIC | Age: 42
End: 2022-11-17
Payer: MEDICAID

## 2022-11-17 DIAGNOSIS — B37.31 VAGINAL YEAST INFECTION: Primary | ICD-10-CM

## 2022-11-17 RX ORDER — FLUCONAZOLE 150 MG/1
150 TABLET ORAL ONCE
Qty: 1 TABLET | Refills: 0 | Status: SHIPPED | OUTPATIENT
Start: 2022-11-17 | End: 2022-11-17

## 2022-11-19 ENCOUNTER — PATIENT MESSAGE (OUTPATIENT)
Dept: FAMILY MEDICINE | Facility: CLINIC | Age: 42
End: 2022-11-19
Payer: MEDICAID
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
## 2023-02-15 ENCOUNTER — PATIENT MESSAGE (OUTPATIENT)
Dept: OBSTETRICS AND GYNECOLOGY | Facility: CLINIC | Age: 43
End: 2023-02-15

## 2023-02-15 ENCOUNTER — OFFICE VISIT (OUTPATIENT)
Dept: OBSTETRICS AND GYNECOLOGY | Facility: CLINIC | Age: 43
End: 2023-02-15
Payer: COMMERCIAL

## 2023-02-15 VITALS
BODY MASS INDEX: 32.49 KG/M2 | WEIGHT: 171.94 LBS | DIASTOLIC BLOOD PRESSURE: 76 MMHG | SYSTOLIC BLOOD PRESSURE: 114 MMHG

## 2023-02-15 DIAGNOSIS — Z01.419 ENCOUNTER FOR GYNECOLOGICAL EXAMINATION WITHOUT ABNORMAL FINDING: Primary | ICD-10-CM

## 2023-02-15 DIAGNOSIS — Z12.31 BREAST CANCER SCREENING BY MAMMOGRAM: ICD-10-CM

## 2023-02-15 DIAGNOSIS — B37.9 YEAST INFECTION: ICD-10-CM

## 2023-02-15 DIAGNOSIS — Z01.419 ENCOUNTER FOR GYNECOLOGICAL EXAMINATION WITHOUT ABNORMAL FINDING: ICD-10-CM

## 2023-02-15 PROCEDURE — 3078F PR MOST RECENT DIASTOLIC BLOOD PRESSURE < 80 MM HG: ICD-10-PCS | Mod: CPTII,S$GLB,, | Performed by: OBSTETRICS & GYNECOLOGY

## 2023-02-15 PROCEDURE — 3074F SYST BP LT 130 MM HG: CPT | Mod: CPTII,S$GLB,, | Performed by: OBSTETRICS & GYNECOLOGY

## 2023-02-15 PROCEDURE — 3008F BODY MASS INDEX DOCD: CPT | Mod: CPTII,S$GLB,, | Performed by: OBSTETRICS & GYNECOLOGY

## 2023-02-15 PROCEDURE — 3008F PR BODY MASS INDEX (BMI) DOCUMENTED: ICD-10-PCS | Mod: CPTII,S$GLB,, | Performed by: OBSTETRICS & GYNECOLOGY

## 2023-02-15 PROCEDURE — 99999 PR PBB SHADOW E&M-EST. PATIENT-LVL II: ICD-10-PCS | Mod: PBBFAC,,, | Performed by: OBSTETRICS & GYNECOLOGY

## 2023-02-15 PROCEDURE — 3078F DIAST BP <80 MM HG: CPT | Mod: CPTII,S$GLB,, | Performed by: OBSTETRICS & GYNECOLOGY

## 2023-02-15 PROCEDURE — 3074F PR MOST RECENT SYSTOLIC BLOOD PRESSURE < 130 MM HG: ICD-10-PCS | Mod: CPTII,S$GLB,, | Performed by: OBSTETRICS & GYNECOLOGY

## 2023-02-15 PROCEDURE — 99999 PR PBB SHADOW E&M-EST. PATIENT-LVL II: CPT | Mod: PBBFAC,,, | Performed by: OBSTETRICS & GYNECOLOGY

## 2023-02-15 PROCEDURE — 99396 PR PREVENTIVE VISIT,EST,40-64: ICD-10-PCS | Mod: S$GLB,,, | Performed by: OBSTETRICS & GYNECOLOGY

## 2023-02-15 PROCEDURE — 99396 PREV VISIT EST AGE 40-64: CPT | Mod: S$GLB,,, | Performed by: OBSTETRICS & GYNECOLOGY

## 2023-02-15 RX ORDER — FLUCONAZOLE 150 MG/1
150 TABLET ORAL DAILY
Qty: 1 TABLET | Refills: 0 | Status: SHIPPED | OUTPATIENT
Start: 2023-02-15 | End: 2023-02-16

## 2023-02-15 RX ORDER — DROSPIRENONE AND ETHINYL ESTRADIOL 0.02-3(28)
1 KIT ORAL DAILY
Qty: 28 TABLET | Refills: 12 | Status: SHIPPED | OUTPATIENT
Start: 2023-02-15 | End: 2023-02-15 | Stop reason: SDUPTHER

## 2023-02-15 RX ORDER — DROSPIRENONE AND ETHINYL ESTRADIOL 0.02-3(28)
1 KIT ORAL DAILY
Qty: 84 TABLET | Refills: 4 | Status: SHIPPED | OUTPATIENT
Start: 2023-02-15 | End: 2024-03-18 | Stop reason: SDUPTHER

## 2023-02-15 NOTE — PROGRESS NOTES
Subjective:       Patient ID: Umer Rodriguez is a 42 y.o. female.    Chief Complaint:  Consult and Well Woman      History of Present Illness  HPI  Annual Exam-Premenopausal  Patient presents for annual exam. The patient has no complaints today. The patient is sexually active. GYN screening history: last pap: was normal and last mammogram: was normal. The patient wears seatbelts: yes. The patient participates in regular exercise: yes. Has the patient ever been transfused or tattooed?: yes. The patient reports that there is not domestic violence in her life.    GYN & OB History  No LMP recorded. (Menstrual status: Birth Control).   Date of Last Pap: 2021    OB History    Para Term  AB Living   3 3 2 1   2   SAB IAB Ectopic Multiple Live Births           2      # Outcome Date GA Lbr Diogo/2nd Weight Sex Delivery Anes PTL Lv   3  13   2.495 kg (5 lb 8 oz) M CS-Unspec   VALERIE   2 Term 12/08/10 40w0d  3.856 kg (8 lb 8 oz) F CS-LTranv   VALERIE   1 Term              Past Medical History:  Past Medical History:   Diagnosis Date    Allergy     Fibroids     ARNOL (iron deficiency anemia) 2014       Past Surgical History:  Past Surgical History:   Procedure Laterality Date     SECTION  2013    CYSTOSCOPY N/A 2019    Procedure: CYSTOSCOPY;  Surgeon: Pollo Park MD;  Location: [address];  Service: Urology;  Laterality: N/A;  30 min/2nd floor OR    FOOT SURGERY Right     INJECTION OF BOTULINUM TOXIN TYPE A N/A 2019    Procedure: INJECTION, BOTULINUM TOXIN, TYPE A;  Surgeon: Pollo Park MD;  Location: [address];  Service: Urology;  Laterality: N/A;       Family History:  Family History   Problem Relation Age of Onset    Hypertension Mother     Breast cancer Neg Hx     Colon cancer Neg Hx     Ovarian cancer Neg Hx     Hyperlipidemia Neg Hx     Heart disease Neg Hx     Diabetes Neg Hx     Cancer Neg Hx     Arthritis Neg Hx        Allergies:  Review of  patient's allergies indicates:   Allergen Reactions    Oxycodone Anaphylaxis       Medications:  Current Outpatient Medications on File Prior to Visit   Medication Sig Dispense Refill    BINAXNOW COVID-19 AG SELF TEST Kit       cetirizine (ZYRTEC) 10 MG tablet Take 10 mg by mouth once daily.      hydrOXYzine HCL (ATARAX) 25 MG tablet Take by mouth.      ibuprofen (ADVIL,MOTRIN) 800 MG tablet Take 800 mg by mouth 2 (two) times daily as needed.      predniSONE (DELTASONE) 20 MG tablet Take 60 mg by mouth once daily.      [DISCONTINUED] L norgest/e.estradioL-e.estrad (ASHLYNA) 0.15 mg-30 mcg (84)/10 mcg (7) 3MPk Take 1 tablet by mouth once daily. 1 each 7     No current facility-administered medications on file prior to visit.       Social History:  Social History     Tobacco Use    Smoking status: Never    Smokeless tobacco: Never   Substance Use Topics    Alcohol use: Yes     Comment: occasional    Drug use: No            Review of Systems  Review of Systems   Constitutional: Negative.    HENT: Negative.     Eyes: Negative.    Respiratory: Negative.     Cardiovascular: Negative.    Gastrointestinal: Negative.    Endocrine: Negative.    Genitourinary: Negative.    Musculoskeletal: Negative.    Integumentary:  Negative.   Neurological: Negative.    Hematological: Negative.    Psychiatric/Behavioral: Negative.     Breast: negative.          Objective:    Physical Exam:   Constitutional: She is oriented to person, place, and time. She appears well-nourished.    HENT:   Head: Normocephalic and atraumatic.    Eyes: EOM are normal. Right eye exhibits normal extraocular motion. Left eye exhibits normal extraocular motion.    Neck: No thyromegaly present.    Cardiovascular:  Normal rate.             Pulmonary/Chest: Effort normal. No respiratory distress. Right breast exhibits no mass, no skin change and no tenderness. Left breast exhibits no mass, no skin change and no tenderness. Breasts are symmetrical.        Abdominal:  Soft. She exhibits no distension and no mass. There is no abdominal tenderness.     Genitourinary:    Vagina, uterus, right adnexa and left adnexa normal.      Pelvic exam was performed with patient supine.   The external female genitalia was normal.   No external genitalia lesions identified,Genitalia hair distrobution normal .   Labial bartholins normal.There is no rash or lesion on the right labia. There is no rash or lesion on the left labia. Cervix is normal. Right adnexum displays no tenderness and no fullness. Left adnexum displays no tenderness and no fullness. No  no vaginal discharge or bleeding in the vagina.    No signs of injury in the vagina.   Vagina was moist.Cervix exhibits no motion tenderness and no friability. Uterus consistancy normal. and Uerus contour normal  Uterus is not tender. Normal urethral meatus.Urethral Meatus exhibits: urethral lesion and prolapsedUrethra findings: no urethral mass and no tendernessBladder findings: no bladder distention and no bladder tenderness          Musculoskeletal: Normal range of motion.      Lymphadenopathy:     She has no cervical adenopathy.    Neurological: She is oriented to person, place, and time.   Cranial Nerves II-XII grossly intact.    Skin: No rash noted. No erythema.    Psychiatric: She has a normal mood and affect. Her behavior is normal.        Assessment:        1. Encounter for gynecological examination without abnormal finding    2. Breast cancer screening by mammogram    3. Yeast infection                Plan:      1. Encounter for gynecological examination without abnormal finding  - Pap due in 1 year.  -   Screening tests as ordered.  - Diet and exercise encouraged.    Counseling: injury prevention: Driving under the influence of alcohol  Seatbelts  Stress management techniques  indications for and frequency of periodic gynecologic exam  reviewed current Pap guidelines. Explained new understanding of natural history of cervical disease  and improved Paps. Recommended guideline concordant care.  - drospirenone-ethinyl estradioL (PHU) 3-0.02 mg per tablet; Take 1 tablet by mouth once daily.  Dispense: 28 tablet; Refill: 12    2. Breast cancer screening by mammogram  - Self breast exams encouraged  - Mammo Digital Screening Bilat w/ Lev; Future    3. Yeast infection  - fluconazole (DIFLUCAN) 150 MG Tab; Take 1 tablet (150 mg total) by mouth once daily. for 1 day  Dispense: 1 tablet; Refill: 0

## 2023-02-19 ENCOUNTER — PATIENT MESSAGE (OUTPATIENT)
Dept: OBSTETRICS AND GYNECOLOGY | Facility: CLINIC | Age: 43
End: 2023-02-19
Payer: COMMERCIAL

## 2023-03-19 ENCOUNTER — PATIENT MESSAGE (OUTPATIENT)
Dept: OBSTETRICS AND GYNECOLOGY | Facility: CLINIC | Age: 43
End: 2023-03-19
Payer: COMMERCIAL

## 2023-03-19 DIAGNOSIS — B37.9 YEAST INFECTION: Primary | ICD-10-CM

## 2023-03-21 RX ORDER — FLUCONAZOLE 150 MG/1
150 TABLET ORAL DAILY
Qty: 3 TABLET | Refills: 3 | Status: SHIPPED | OUTPATIENT
Start: 2023-03-21 | End: 2023-03-22

## 2023-06-09 ENCOUNTER — PATIENT OUTREACH (OUTPATIENT)
Dept: ADMINISTRATIVE | Facility: HOSPITAL | Age: 43
End: 2023-06-09
Payer: COMMERCIAL

## 2023-06-09 NOTE — PROGRESS NOTES
HM and immunization's reviewed and updated. Due for a visit with Svitlana Becker MD. Considered as new patient if scheduled after 11/10/2023.  If no longer PCP need to remove and update new provider/facility. Dr. Svitlana Becker, no longer accepting new patients, please schedule before date. Scheduled 10/31/2023.

## 2023-10-12 ENCOUNTER — PATIENT MESSAGE (OUTPATIENT)
Dept: OBSTETRICS AND GYNECOLOGY | Facility: CLINIC | Age: 43
End: 2023-10-12
Payer: COMMERCIAL

## 2023-10-12 ENCOUNTER — PATIENT MESSAGE (OUTPATIENT)
Dept: FAMILY MEDICINE | Facility: CLINIC | Age: 43
End: 2023-10-12
Payer: COMMERCIAL

## 2023-10-13 ENCOUNTER — TELEPHONE (OUTPATIENT)
Dept: OBSTETRICS AND GYNECOLOGY | Facility: CLINIC | Age: 43
End: 2023-10-13
Payer: COMMERCIAL

## 2023-10-13 DIAGNOSIS — N93.9 ABNORMAL UTERINE BLEEDING (AUB): Primary | ICD-10-CM

## 2023-10-16 ENCOUNTER — HOSPITAL ENCOUNTER (OUTPATIENT)
Dept: RADIOLOGY | Facility: OTHER | Age: 43
Discharge: HOME | End: 2023-10-16
Attending: OBSTETRICS & GYNECOLOGY
Payer: COMMERCIAL

## 2023-10-16 DIAGNOSIS — N93.9 ABNORMAL UTERINE BLEEDING (AUB): ICD-10-CM

## 2023-10-16 PROCEDURE — 76830 TRANSVAGINAL US NON-OB: CPT | Mod: TC

## 2023-10-16 PROCEDURE — 76830 TRANSVAGINAL US NON-OB: CPT | Mod: 26,,, | Performed by: RADIOLOGY

## 2023-10-16 PROCEDURE — 76856 US PELVIS COMP WITH TRANSVAG NON-OB (XPD): ICD-10-PCS | Mod: 26,,, | Performed by: RADIOLOGY

## 2023-10-16 PROCEDURE — 76856 US EXAM PELVIC COMPLETE: CPT | Mod: 26,,, | Performed by: RADIOLOGY

## 2023-10-16 PROCEDURE — 76830 US PELVIS COMP WITH TRANSVAG NON-OB (XPD): ICD-10-PCS | Mod: 26,,, | Performed by: RADIOLOGY

## 2023-10-18 ENCOUNTER — HOSPITAL ENCOUNTER (OUTPATIENT)
Dept: RADIOLOGY | Facility: OTHER | Age: 43
Discharge: HOME | End: 2023-10-18
Attending: OBSTETRICS & GYNECOLOGY
Payer: COMMERCIAL

## 2023-10-18 DIAGNOSIS — Z12.31 BREAST CANCER SCREENING BY MAMMOGRAM: ICD-10-CM

## 2023-10-18 PROCEDURE — 77063 MAMMO DIGITAL SCREENING BILAT WITH TOMO: ICD-10-PCS | Mod: 26,,, | Performed by: RADIOLOGY

## 2023-10-18 PROCEDURE — 77067 MAMMO DIGITAL SCREENING BILAT WITH TOMO: ICD-10-PCS | Mod: 26,,, | Performed by: RADIOLOGY

## 2023-10-18 PROCEDURE — 77067 SCR MAMMO BI INCL CAD: CPT | Mod: 26,,, | Performed by: RADIOLOGY

## 2023-10-18 PROCEDURE — 77067 SCR MAMMO BI INCL CAD: CPT | Mod: TC

## 2023-10-18 PROCEDURE — 77063 BREAST TOMOSYNTHESIS BI: CPT | Mod: 26,,, | Performed by: RADIOLOGY

## 2023-10-19 ENCOUNTER — PATIENT MESSAGE (OUTPATIENT)
Dept: OBSTETRICS AND GYNECOLOGY | Facility: CLINIC | Age: 43
End: 2023-10-19
Payer: COMMERCIAL

## 2023-10-23 ENCOUNTER — PATIENT MESSAGE (OUTPATIENT)
Dept: OBSTETRICS AND GYNECOLOGY | Facility: CLINIC | Age: 43
End: 2023-10-23
Payer: COMMERCIAL

## 2023-10-24 ENCOUNTER — TELEPHONE (OUTPATIENT)
Dept: OBSTETRICS AND GYNECOLOGY | Facility: HOSPITAL | Age: 43
End: 2023-10-24
Payer: COMMERCIAL

## 2023-10-24 DIAGNOSIS — N93.9 ABNORMAL UTERINE BLEEDING (AUB): Primary | ICD-10-CM

## 2023-10-24 RX ORDER — MEDROXYPROGESTERONE ACETATE 10 MG/1
10 TABLET ORAL DAILY
Qty: 30 TABLET | Refills: 11 | Status: SHIPPED | OUTPATIENT
Start: 2023-10-24 | End: 2024-10-23

## 2023-10-31 ENCOUNTER — LAB VISIT (OUTPATIENT)
Dept: LAB | Facility: HOSPITAL | Age: 43
End: 2023-10-31
Attending: INTERNAL MEDICINE
Payer: COMMERCIAL

## 2023-10-31 ENCOUNTER — OFFICE VISIT (OUTPATIENT)
Dept: FAMILY MEDICINE | Facility: CLINIC | Age: 43
End: 2023-10-31
Payer: COMMERCIAL

## 2023-10-31 VITALS
BODY MASS INDEX: 32.46 KG/M2 | TEMPERATURE: 99 F | RESPIRATION RATE: 16 BRPM | HEART RATE: 88 BPM | SYSTOLIC BLOOD PRESSURE: 118 MMHG | OXYGEN SATURATION: 99 % | WEIGHT: 171.94 LBS | DIASTOLIC BLOOD PRESSURE: 72 MMHG | HEIGHT: 61 IN

## 2023-10-31 DIAGNOSIS — E55.9 VITAMIN D DEFICIENCY: ICD-10-CM

## 2023-10-31 DIAGNOSIS — E66.09 CLASS 1 OBESITY DUE TO EXCESS CALORIES WITH SERIOUS COMORBIDITY AND BODY MASS INDEX (BMI) OF 32.0 TO 32.9 IN ADULT: ICD-10-CM

## 2023-10-31 DIAGNOSIS — D50.9 IRON DEFICIENCY ANEMIA, UNSPECIFIED IRON DEFICIENCY ANEMIA TYPE: ICD-10-CM

## 2023-10-31 DIAGNOSIS — Z00.00 ANNUAL PHYSICAL EXAM: ICD-10-CM

## 2023-10-31 DIAGNOSIS — R73.03 PREDIABETES: ICD-10-CM

## 2023-10-31 DIAGNOSIS — Z87.09 HISTORY OF INFLUENZA: ICD-10-CM

## 2023-10-31 DIAGNOSIS — Z00.00 ANNUAL PHYSICAL EXAM: Primary | ICD-10-CM

## 2023-10-31 DIAGNOSIS — R10.9 STOMACH PAIN: ICD-10-CM

## 2023-10-31 LAB
25(OH)D3+25(OH)D2 SERPL-MCNC: 13 NG/ML (ref 30–96)
ALBUMIN SERPL BCP-MCNC: 3.4 G/DL (ref 3.5–5.2)
ALP SERPL-CCNC: 53 U/L (ref 55–135)
ALT SERPL W/O P-5'-P-CCNC: 14 U/L (ref 10–44)
ANION GAP SERPL CALC-SCNC: 6 MMOL/L (ref 8–16)
AST SERPL-CCNC: 16 U/L (ref 10–40)
BASOPHILS # BLD AUTO: 0.04 K/UL (ref 0–0.2)
BASOPHILS NFR BLD: 0.6 % (ref 0–1.9)
BILIRUB SERPL-MCNC: 0.5 MG/DL (ref 0.1–1)
BUN SERPL-MCNC: 16 MG/DL (ref 6–20)
CALCIUM SERPL-MCNC: 9.8 MG/DL (ref 8.7–10.5)
CHLORIDE SERPL-SCNC: 111 MMOL/L (ref 95–110)
CHOLEST SERPL-MCNC: 211 MG/DL (ref 120–199)
CHOLEST/HDLC SERPL: 3.4 {RATIO} (ref 2–5)
CO2 SERPL-SCNC: 23 MMOL/L (ref 23–29)
CREAT SERPL-MCNC: 0.9 MG/DL (ref 0.5–1.4)
DIFFERENTIAL METHOD: ABNORMAL
EOSINOPHIL # BLD AUTO: 0.1 K/UL (ref 0–0.5)
EOSINOPHIL NFR BLD: 1.3 % (ref 0–8)
ERYTHROCYTE [DISTWIDTH] IN BLOOD BY AUTOMATED COUNT: 18.1 % (ref 11.5–14.5)
EST. GFR  (NO RACE VARIABLE): >60 ML/MIN/1.73 M^2
ESTIMATED AVG GLUCOSE: 128 MG/DL (ref 68–131)
FERRITIN SERPL-MCNC: 5 NG/ML (ref 20–300)
GLUCOSE SERPL-MCNC: 96 MG/DL (ref 70–110)
HBA1C MFR BLD: 6.1 % (ref 4–5.6)
HCT VFR BLD AUTO: 28.8 % (ref 37–48.5)
HDLC SERPL-MCNC: 62 MG/DL (ref 40–75)
HDLC SERPL: 29.4 % (ref 20–50)
HGB BLD-MCNC: 8 G/DL (ref 12–16)
IMM GRANULOCYTES # BLD AUTO: 0.01 K/UL (ref 0–0.04)
IMM GRANULOCYTES NFR BLD AUTO: 0.1 % (ref 0–0.5)
IRON SERPL-MCNC: 23 UG/DL (ref 30–160)
LDLC SERPL CALC-MCNC: 116 MG/DL (ref 63–159)
LYMPHOCYTES # BLD AUTO: 2 K/UL (ref 1–4.8)
LYMPHOCYTES NFR BLD: 28.1 % (ref 18–48)
MCH RBC QN AUTO: 20.1 PG (ref 27–31)
MCHC RBC AUTO-ENTMCNC: 27.8 G/DL (ref 32–36)
MCV RBC AUTO: 72 FL (ref 82–98)
MONOCYTES # BLD AUTO: 0.5 K/UL (ref 0.3–1)
MONOCYTES NFR BLD: 7.8 % (ref 4–15)
NEUTROPHILS # BLD AUTO: 4.3 K/UL (ref 1.8–7.7)
NEUTROPHILS NFR BLD: 62.1 % (ref 38–73)
NONHDLC SERPL-MCNC: 149 MG/DL
NRBC BLD-RTO: 0 /100 WBC
PLATELET # BLD AUTO: 361 K/UL (ref 150–450)
PMV BLD AUTO: 11.5 FL (ref 9.2–12.9)
POTASSIUM SERPL-SCNC: 4.3 MMOL/L (ref 3.5–5.1)
PROT SERPL-MCNC: 7.7 G/DL (ref 6–8.4)
RBC # BLD AUTO: 3.98 M/UL (ref 4–5.4)
SATURATED IRON: 4 % (ref 20–50)
SODIUM SERPL-SCNC: 140 MMOL/L (ref 136–145)
TOTAL IRON BINDING CAPACITY: 574 UG/DL (ref 250–450)
TRANSFERRIN SERPL-MCNC: 388 MG/DL (ref 200–375)
TRIGL SERPL-MCNC: 165 MG/DL (ref 30–150)
TSH SERPL DL<=0.005 MIU/L-ACNC: 3.62 UIU/ML (ref 0.4–4)
WBC # BLD AUTO: 6.93 K/UL (ref 3.9–12.7)

## 2023-10-31 PROCEDURE — 99999 PR PBB SHADOW E&M-EST. PATIENT-LVL IV: CPT | Mod: PBBFAC,,, | Performed by: INTERNAL MEDICINE

## 2023-10-31 PROCEDURE — 1160F RVW MEDS BY RX/DR IN RCRD: CPT | Mod: CPTII,S$GLB,, | Performed by: INTERNAL MEDICINE

## 2023-10-31 PROCEDURE — 84443 ASSAY THYROID STIM HORMONE: CPT | Performed by: INTERNAL MEDICINE

## 2023-10-31 PROCEDURE — 3078F DIAST BP <80 MM HG: CPT | Mod: CPTII,S$GLB,, | Performed by: INTERNAL MEDICINE

## 2023-10-31 PROCEDURE — 3008F PR BODY MASS INDEX (BMI) DOCUMENTED: ICD-10-PCS | Mod: CPTII,S$GLB,, | Performed by: INTERNAL MEDICINE

## 2023-10-31 PROCEDURE — 1160F PR REVIEW ALL MEDS BY PRESCRIBER/CLIN PHARMACIST DOCUMENTED: ICD-10-PCS | Mod: CPTII,S$GLB,, | Performed by: INTERNAL MEDICINE

## 2023-10-31 PROCEDURE — 80061 LIPID PANEL: CPT | Performed by: INTERNAL MEDICINE

## 2023-10-31 PROCEDURE — 3078F PR MOST RECENT DIASTOLIC BLOOD PRESSURE < 80 MM HG: ICD-10-PCS | Mod: CPTII,S$GLB,, | Performed by: INTERNAL MEDICINE

## 2023-10-31 PROCEDURE — 80053 COMPREHEN METABOLIC PANEL: CPT | Performed by: INTERNAL MEDICINE

## 2023-10-31 PROCEDURE — 82728 ASSAY OF FERRITIN: CPT | Performed by: INTERNAL MEDICINE

## 2023-10-31 PROCEDURE — 1159F PR MEDICATION LIST DOCUMENTED IN MEDICAL RECORD: ICD-10-PCS | Mod: CPTII,S$GLB,, | Performed by: INTERNAL MEDICINE

## 2023-10-31 PROCEDURE — 3074F PR MOST RECENT SYSTOLIC BLOOD PRESSURE < 130 MM HG: ICD-10-PCS | Mod: CPTII,S$GLB,, | Performed by: INTERNAL MEDICINE

## 2023-10-31 PROCEDURE — 3008F BODY MASS INDEX DOCD: CPT | Mod: CPTII,S$GLB,, | Performed by: INTERNAL MEDICINE

## 2023-10-31 PROCEDURE — 3074F SYST BP LT 130 MM HG: CPT | Mod: CPTII,S$GLB,, | Performed by: INTERNAL MEDICINE

## 2023-10-31 PROCEDURE — 83036 HEMOGLOBIN GLYCOSYLATED A1C: CPT | Performed by: INTERNAL MEDICINE

## 2023-10-31 PROCEDURE — 99396 PREV VISIT EST AGE 40-64: CPT | Mod: S$GLB,,, | Performed by: INTERNAL MEDICINE

## 2023-10-31 PROCEDURE — 82306 VITAMIN D 25 HYDROXY: CPT | Performed by: INTERNAL MEDICINE

## 2023-10-31 PROCEDURE — 85025 COMPLETE CBC W/AUTO DIFF WBC: CPT | Performed by: INTERNAL MEDICINE

## 2023-10-31 PROCEDURE — 83540 ASSAY OF IRON: CPT | Performed by: INTERNAL MEDICINE

## 2023-10-31 PROCEDURE — 99396 PR PREVENTIVE VISIT,EST,40-64: ICD-10-PCS | Mod: S$GLB,,, | Performed by: INTERNAL MEDICINE

## 2023-10-31 PROCEDURE — 36415 COLL VENOUS BLD VENIPUNCTURE: CPT | Mod: PO | Performed by: INTERNAL MEDICINE

## 2023-10-31 PROCEDURE — 1159F MED LIST DOCD IN RCRD: CPT | Mod: CPTII,S$GLB,, | Performed by: INTERNAL MEDICINE

## 2023-10-31 PROCEDURE — 84466 ASSAY OF TRANSFERRIN: CPT | Performed by: INTERNAL MEDICINE

## 2023-10-31 PROCEDURE — 99999 PR PBB SHADOW E&M-EST. PATIENT-LVL IV: ICD-10-PCS | Mod: PBBFAC,,, | Performed by: INTERNAL MEDICINE

## 2023-10-31 RX ORDER — AZELAIC ACID 0.15 G/G
GEL TOPICAL 2 TIMES DAILY
COMMUNITY
Start: 2023-08-16

## 2023-10-31 RX ORDER — SPIRONOLACTONE 100 MG/1
100 TABLET, FILM COATED ORAL NIGHTLY
COMMUNITY
Start: 2023-08-16

## 2023-10-31 RX ORDER — CEPHALEXIN 500 MG/1
500 CAPSULE ORAL 2 TIMES DAILY
COMMUNITY
Start: 2023-07-02

## 2023-10-31 RX ORDER — PANTOPRAZOLE SODIUM 40 MG/1
40 TABLET, DELAYED RELEASE ORAL DAILY
Qty: 30 TABLET | Refills: 0 | Status: SHIPPED | OUTPATIENT
Start: 2023-10-31 | End: 2023-11-28

## 2023-10-31 RX ORDER — HYDROXYZINE PAMOATE 25 MG/1
CAPSULE ORAL
COMMUNITY
Start: 2023-07-02

## 2023-10-31 RX ORDER — MUPIROCIN 20 MG/G
OINTMENT TOPICAL
COMMUNITY
Start: 2023-07-02

## 2023-10-31 RX ORDER — NYSTATIN 100000 U/G
CREAM TOPICAL 2 TIMES DAILY
COMMUNITY
Start: 2023-07-02

## 2023-10-31 RX ORDER — ONDANSETRON 4 MG/1
TABLET, ORALLY DISINTEGRATING ORAL
COMMUNITY
Start: 2023-10-17

## 2023-10-31 RX ORDER — SULFACETAMIDE SODIUM, SULFUR 100; 50 MG/G; MG/G
EMULSION TOPICAL 2 TIMES DAILY
COMMUNITY
Start: 2023-08-16

## 2023-10-31 NOTE — PROGRESS NOTES
Health Maintenance Due   Topic     Influenza Vaccine (1)     COVID-19 Vaccine (4 - 2023-24 season)

## 2023-10-31 NOTE — PROGRESS NOTES
Subjective:       Patient ID: Umer Rodriguez is a pleasant 43 y.o. Black or  female patient    Chief Complaint: Annual Exam      Patient is a pt I saw last on 11/10/2020, see my last notes.     HPI     Pt with PMH as per list of problems below coming today to do an annual physical exam.  From our last visit:  - Dermatology.  She reports feeling globally fine, she is no major issue to report.    She is been working on her lifestyle, and lost 15 lb from last year.    She got to the flu 2 weeks ago, only took 2 days of Tamiflu 1 week after being diagnosed.  She had symptoms of runny nose, postnasal drip as well as GI issues with epigastric pain after meals and occasional diarrhea.    She is still not totally back to normal bowel movements.    She states that salads are easier to tolerate on her stomach.    Patient Active Problem List   Diagnosis    ARNOL (iron deficiency anemia)    Toe contracture    Painful orthopaedic hardware    Short toes    Spina bifida of lumbosacral region without hydrocephalus    Tethered spinal cord    Detrusor instability          ACTIVE MEDICAL ISSUES:  Documented in Problem List     PAST MEDICAL HISTORY  Documented     PAST SURGICAL HISTORY:  Documented     SOCIAL HISTORY:  Documented     FAMILY HISTORY:  Documented     ALLERGIES AND MEDICATIONS: updated and reviewed.  Documented    Review of Systems   Constitutional:  Negative for activity change, appetite change, fatigue and fever.   HENT:  Negative for congestion, postnasal drip, rhinorrhea, sinus pressure and sore throat.    Eyes:  Negative for pain, discharge and redness.   Respiratory:  Negative for cough, chest tightness and shortness of breath.    Cardiovascular:  Negative for chest pain, palpitations and leg swelling.   Gastrointestinal:  Positive for abdominal pain (epigastric) and diarrhea. Negative for constipation and nausea.   Endocrine: Negative for cold intolerance and heat intolerance.   Genitourinary:   Negative for difficulty urinating, flank pain, frequency, menstrual problem, pelvic pain, urgency and vaginal discharge.   Musculoskeletal:  Negative for arthralgias, back pain, joint swelling and neck pain.   Skin:  Negative for color change and rash.   Allergic/Immunologic: Negative for environmental allergies and food allergies.   Neurological:  Negative for weakness, numbness and headaches.   Hematological:  Negative for adenopathy.   Psychiatric/Behavioral:  Negative for behavioral problems, hallucinations, sleep disturbance and suicidal ideas. The patient is not nervous/anxious.        Objective:      Physical Exam  Vitals and nursing note reviewed.   Constitutional:       Appearance: She is well-developed.   HENT:      Right Ear: External ear normal.      Left Ear: External ear normal.   Eyes:      Conjunctiva/sclera: Conjunctivae normal.   Neck:      Thyroid: No thyromegaly.   Cardiovascular:      Rate and Rhythm: Normal rate and regular rhythm.      Pulses: Normal pulses.      Heart sounds: Normal heart sounds.   Pulmonary:      Effort: Pulmonary effort is normal. No respiratory distress.      Breath sounds: Normal breath sounds. No wheezing.   Abdominal:      General: Bowel sounds are normal.      Palpations: Abdomen is soft. There is no mass.      Tenderness: There is abdominal tenderness (mild, epigastric).   Musculoskeletal:         General: Normal range of motion.      Cervical back: Normal range of motion and neck supple.   Lymphadenopathy:      Cervical: No cervical adenopathy.   Skin:     General: Skin is warm and dry.   Neurological:      Mental Status: She is alert and oriented to person, place, and time. Mental status is at baseline.   Psychiatric:         Mood and Affect: Mood normal.         Behavior: Behavior normal.         Thought Content: Thought content normal.         Judgment: Judgment normal.         Vitals:    10/31/23 0823   BP: 118/72   BP Location: Left arm   Patient Position:  "Sitting   BP Method: Large (Manual)   Pulse: 88   Resp: 16   Temp: 98.5 °F (36.9 °C)   TempSrc: Oral   SpO2: 99%   Weight: 78 kg (171 lb 15.3 oz)   Height: 5' 1" (1.549 m)     Body mass index is 32.49 kg/m².    RESULTS: Reviewed labs from last 12 months    Last Lab Results:     Lab Results   Component Value Date    WBC 8.16 10/25/2022    HGB 8.7 (L) 10/25/2022    HCT 31.1 (L) 10/25/2022     10/25/2022     10/25/2022    K 4.2 10/25/2022     10/25/2022    CO2 22 (L) 10/25/2022    BUN 12 10/25/2022    CREATININE 1.0 10/25/2022    CALCIUM 9.3 10/25/2022    ALBUMIN 3.2 (L) 10/25/2022    AST 20 10/25/2022    ALT 19 10/25/2022    CHOL 191 10/25/2022    TRIG 88 10/25/2022    HDL 43 10/25/2022    LDLCALC 130.4 10/25/2022    HGBA1C 5.7 (H) 10/25/2022    TSH 1.531 10/30/2018       Assessment:       1. Annual physical exam    2. Prediabetes    3. Stomach pain    4. Iron deficiency anemia, unspecified iron deficiency anemia type    5. Vitamin D deficiency    6. History of influenza        Plan:   Umer was seen today for annual exam.    Diagnoses and all orders for this visit:    Annual physical exam  -     CBC Auto Differential; Future  -     Comprehensive Metabolic Panel; Future  -     Hemoglobin A1C; Future  -     TSH; Future  -     Lipid Panel; Future    Will do usual blood work, discussed and updated preventative measures, patient had the flu 2 weeks ago, advised to wait 4 months to take the flu shot, she was advised to take the COVID booster.    Prediabetes    Will monitor.    Class 1 obesity due to excess calories with serious comorbidity and body mass index (BMI) of 32.0 to 32.9 in adult    We discussed weight issues and safe, effective ways of losing pounds, includin) diet:  low carbohydrate, low fat diet, stay away from fast food, fried and processed food, use whole grain, lot of fruits and vegetables, use healthy fat such as avocado, nuts and olive oil in reasonable quantity, stay away from " sodas. Regular meals with lean proteins.  2) physical activity: ideally 150 min a week, with cardiovascular and resistance activity.  Patient was encouraged to set realistic attainable goals for weight loss, and we will follow up periodically.    Stomach pain  -     pantoprazole (PROTONIX) 40 MG tablet; Take 1 tablet (40 mg total) by mouth once daily.    Will give PPI for now, advised re: bland diet, stay away from dairy products for now, as well as raw fruits and vegetables.    Iron deficiency anemia, unspecified iron deficiency anemia type  -     Iron and TIBC; Future  -     Ferritin; Future    Will monitor.    Vitamin D deficiency  -     Vitamin D; Future    History of influenza      No follow-ups on file.    This note was created by combination of typed  and M-Modal dictation.  Transcription errors may be present.  If there are any questions, please contact me.

## 2023-11-05 ENCOUNTER — PATIENT MESSAGE (OUTPATIENT)
Dept: FAMILY MEDICINE | Facility: CLINIC | Age: 43
End: 2023-11-05
Payer: COMMERCIAL

## 2023-11-06 ENCOUNTER — TELEPHONE (OUTPATIENT)
Dept: FAMILY MEDICINE | Facility: CLINIC | Age: 43
End: 2023-11-06
Payer: COMMERCIAL

## 2023-11-06 DIAGNOSIS — E55.9 VITAMIN D DEFICIENCY: Primary | ICD-10-CM

## 2023-11-06 RX ORDER — ERGOCALCIFEROL 1.25 MG/1
50000 CAPSULE ORAL
Qty: 12 CAPSULE | Refills: 0 | Status: SHIPPED | OUTPATIENT
Start: 2023-11-06 | End: 2024-02-09

## 2023-11-07 ENCOUNTER — PATIENT MESSAGE (OUTPATIENT)
Dept: FAMILY MEDICINE | Facility: CLINIC | Age: 43
End: 2023-11-07
Payer: COMMERCIAL

## 2023-11-27 NOTE — PROGRESS NOTES
The patient location is: [place]  The chief complaint leading to consultation is: results    Visit type: audiovisual    Face to Face time with patient: 10 min  20 minutes of total time spent on the encounter, which includes face to face time and non-face to face time preparing to see the patient (eg, review of tests), Obtaining and/or reviewing separately obtained history, Documenting clinical information in the electronic or other health record, Independently interpreting results (not separately reported) and communicating results to the patient/family/caregiver, or Care coordination (not separately reported).         Each patient to whom he or she provides medical services by telemedicine is:  (1) informed of the relationship between the physician and patient and the respective role of any other health care provider with respect to management of the patient; and (2) notified that he or she may decline to receive medical services by telemedicine and may withdraw from such care at any time.  Subjective:       Patient ID: Umer Rodriguez is a pleasant 43 y.o. Black or  female patient    Chief Complaint: Results      Patient is a pt I saw last on 10/31/2023, see my last notes..     Abdominal Pain  This is a recurrent problem. The current episode started 1 to 4 weeks ago. The onset quality is gradual. The problem occurs daily. The most recent episode lasted 2 hours. The problem has been gradually improving. The pain is located in the epigastric region. The pain is at a severity of 8/10. The pain is moderate. The quality of the pain is sharp. Associated symptoms include anorexia, belching, a fever and flatus. Pertinent negatives include no arthralgias, constipation, diarrhea, dysuria, frequency, headaches, hematochezia, hematuria, melena, myalgias, nausea, vomiting or weight loss. The pain is aggravated by eating. The pain is relieved by Recumbency. She has tried antacids for the symptoms. The  treatment provided no relief. There is no history of abdominal surgery, colon cancer, Crohn's disease, gallstones, GERD, irritable bowel syndrome, pancreatitis, PUD or ulcerative colitis. Patient's medical history does not include kidney stones and UTI.        Pt with PMH as per list of problems below coming today to discuss results of her recent blood work.  However, it is not an easy visit as the pt developed a sore throat with fever yesterday, and then lost her voice, making it difficult for her to talk.  She reports it started all of a sudden, did not have chills but fatigue. Had fever as per her report.  Did not have N/V, no headache.  Some persons sick around her. None with Dx of COVID, flu or Strep as per her report.    Patient Active Problem List   Diagnosis    ARNOL (iron deficiency anemia)    Toe contracture    Painful orthopaedic hardware    Short toes    Spina bifida of lumbosacral region without hydrocephalus    Tethered spinal cord    Detrusor instability          ACTIVE MEDICAL ISSUES:  Documented in Problem List     PAST MEDICAL HISTORY  Documented     PAST SURGICAL HISTORY:  Documented     SOCIAL HISTORY:  Documented     FAMILY HISTORY:  Documented     ALLERGIES AND MEDICATIONS: updated and reviewed.  Documented    Review of Systems   Constitutional:  Positive for fever. Negative for weight loss.   HENT:  Positive for sore throat and voice change.    Respiratory:  Positive for cough. Negative for shortness of breath.    Gastrointestinal:  Positive for anorexia and flatus. Negative for abdominal pain, constipation, diarrhea, hematochezia, melena, nausea and vomiting.   Genitourinary:  Negative for dysuria, frequency and hematuria.   Musculoskeletal:  Negative for arthralgias and myalgias.   Neurological:  Negative for headaches.       Objective:      Physical Exam    There were no vitals filed for this visit.  There is no height or weight on file to calculate BMI.    RESULTS: Reviewed labs from last 12  months    Last Lab Results:     Lab Results   Component Value Date    WBC 6.93 10/31/2023    HGB 8.0 (L) 10/31/2023    HCT 28.8 (L) 10/31/2023     10/31/2023     10/31/2023    K 4.3 10/31/2023     (H) 10/31/2023    CO2 23 10/31/2023    BUN 16 10/31/2023    CREATININE 0.9 10/31/2023    CALCIUM 9.8 10/31/2023    ALBUMIN 3.4 (L) 10/31/2023    AST 16 10/31/2023    ALT 14 10/31/2023    CHOL 211 (H) 10/31/2023    TRIG 165 (H) 10/31/2023    HDL 62 10/31/2023    LDLCALC 116.0 10/31/2023    HGBA1C 6.1 (H) 10/31/2023    TSH 3.624 10/31/2023         Assessment:       1. Pharyngitis, unspecified etiology    2. Laryngitis    3. Cough, unspecified type    4. Fever, unspecified fever cause    5. Prediabetes    6. Iron deficiency anemia, unspecified iron deficiency anemia type    7. Stomach pain    8. Class 1 obesity due to excess calories with serious comorbidity and body mass index (BMI) of 32.0 to 32.9 in adult    9. Vitamin D deficiency        Plan:   Umer was seen today for results.    Diagnoses and all orders for this visit:    Pharyngitis, unspecified etiology    See HPI. Pt had flu last month. I would like COVID and Strep to be rule out. We have no opening unfortunately and the pt will go to an  to get tested. Advised monitoring of symptoms, drinking tea with lemon, kishan and honey, can gargle with salted water. The need to drink plenty of fluid.     Laryngitis    May benefit of steroids if no Strep.    Cough, unspecified type    See above.    Fever, unspecified fever cause    See above.    Prediabetes    Will discuss at future virtual visit when the pt feels better.    Iron deficiency anemia, unspecified iron deficiency anemia type    See above.     Stomach pain    See above.    Class 1 obesity due to excess calories with serious comorbidity and body mass index (BMI) of 32.0 to 32.9 in adult    Vitamin D deficiency      No follow-ups on file.    This note was created by combination of typed data  entry and M-Modal dictation.  Transcription errors may be present.  If there are any questions, please contact me.

## 2023-11-28 ENCOUNTER — OFFICE VISIT (OUTPATIENT)
Dept: FAMILY MEDICINE | Facility: CLINIC | Age: 43
End: 2023-11-28
Payer: COMMERCIAL

## 2023-11-28 DIAGNOSIS — J04.0 LARYNGITIS: ICD-10-CM

## 2023-11-28 DIAGNOSIS — E66.09 CLASS 1 OBESITY DUE TO EXCESS CALORIES WITH SERIOUS COMORBIDITY AND BODY MASS INDEX (BMI) OF 32.0 TO 32.9 IN ADULT: ICD-10-CM

## 2023-11-28 DIAGNOSIS — E55.9 VITAMIN D DEFICIENCY: ICD-10-CM

## 2023-11-28 DIAGNOSIS — D50.9 IRON DEFICIENCY ANEMIA, UNSPECIFIED IRON DEFICIENCY ANEMIA TYPE: ICD-10-CM

## 2023-11-28 DIAGNOSIS — R50.9 FEVER, UNSPECIFIED FEVER CAUSE: ICD-10-CM

## 2023-11-28 DIAGNOSIS — R73.03 PREDIABETES: ICD-10-CM

## 2023-11-28 DIAGNOSIS — J02.9 PHARYNGITIS, UNSPECIFIED ETIOLOGY: Primary | ICD-10-CM

## 2023-11-28 DIAGNOSIS — R05.9 COUGH, UNSPECIFIED TYPE: ICD-10-CM

## 2023-11-28 DIAGNOSIS — R10.9 STOMACH PAIN: ICD-10-CM

## 2023-11-28 PROCEDURE — 3044F HG A1C LEVEL LT 7.0%: CPT | Mod: CPTII,95,, | Performed by: INTERNAL MEDICINE

## 2023-11-28 PROCEDURE — 1159F PR MEDICATION LIST DOCUMENTED IN MEDICAL RECORD: ICD-10-PCS | Mod: CPTII,95,, | Performed by: INTERNAL MEDICINE

## 2023-11-28 PROCEDURE — 99215 OFFICE O/P EST HI 40 MIN: CPT | Mod: 95,,, | Performed by: INTERNAL MEDICINE

## 2023-11-28 PROCEDURE — 99215 PR OFFICE/OUTPT VISIT, EST, LEVL V, 40-54 MIN: ICD-10-PCS | Mod: 95,,, | Performed by: INTERNAL MEDICINE

## 2023-11-28 PROCEDURE — 1160F PR REVIEW ALL MEDS BY PRESCRIBER/CLIN PHARMACIST DOCUMENTED: ICD-10-PCS | Mod: CPTII,95,, | Performed by: INTERNAL MEDICINE

## 2023-11-28 PROCEDURE — 1159F MED LIST DOCD IN RCRD: CPT | Mod: CPTII,95,, | Performed by: INTERNAL MEDICINE

## 2023-11-28 PROCEDURE — 3044F PR MOST RECENT HEMOGLOBIN A1C LEVEL <7.0%: ICD-10-PCS | Mod: CPTII,95,, | Performed by: INTERNAL MEDICINE

## 2023-11-28 PROCEDURE — 1160F RVW MEDS BY RX/DR IN RCRD: CPT | Mod: CPTII,95,, | Performed by: INTERNAL MEDICINE

## 2023-11-28 RX ORDER — PANTOPRAZOLE SODIUM 40 MG/1
40 TABLET, DELAYED RELEASE ORAL
Qty: 90 TABLET | Refills: 3 | Status: SHIPPED | OUTPATIENT
Start: 2023-11-28

## 2023-11-28 NOTE — TELEPHONE ENCOUNTER
No care due was identified.  [facility] Embedded Care Due Messages. Reference number: [id number].   11/28/2023 4:06:30 AM CST

## 2023-11-28 NOTE — TELEPHONE ENCOUNTER
Refill Routing Note   Medication(s) are not appropriate for processing by [facility] for the following reason(s):        New or recently adjusted medication    [facility] action(s):  Defer               Appointments  past 12m or future 3m with PCP    Date Provider   Last Visit   10/31/2023 Svitlana Becker MD   Next Visit   11/28/2023 Svitlana Becker MD   ED visits in past 90 days: 0        Note composed:7:38 AM 11/28/2023

## 2023-12-14 NOTE — PROGRESS NOTES
The patient location is: [place]  The chief complaint leading to consultation is: blood work results    Visit type: audiovisual    Face to Face time with patient: 10 min  20 minutes of total time spent on the encounter, which includes face to face time and non-face to face time preparing to see the patient (eg, review of tests), Obtaining and/or reviewing separately obtained history, Documenting clinical information in the electronic or other health record, Independently interpreting results (not separately reported) and communicating results to the patient/family/caregiver, or Care coordination (not separately reported).         Each patient to whom he or she provides medical services by telemedicine is:  (1) informed of the relationship between the physician and patient and the respective role of any other health care provider with respect to management of the patient; and (2) notified that he or she may decline to receive medical services by telemedicine and may withdraw from such care at any time.    Subjective:       Patient ID: Umer Rodriguez is a pleasant 43 y.o. Black or  female patient    Chief Complaint: Results      Patient is a pt I saw last on 11/28/2023, see my last notes.    HPI     Patient Active Problem List   Diagnosis    ARNOL (iron deficiency anemia)    Toe contracture    Painful orthopaedic hardware    Short toes    Spina bifida of lumbosacral region without hydrocephalus    Tethered spinal cord    Detrusor instability          ACTIVE MEDICAL ISSUES:  Documented in Problem List     PAST MEDICAL HISTORY  Documented     PAST SURGICAL HISTORY:  Documented     SOCIAL HISTORY:  Documented     FAMILY HISTORY:  Documented     ALLERGIES AND MEDICATIONS: updated and reviewed.  Documented    Review of Systems   Constitutional:  Negative for activity change and unexpected weight change.   HENT:  Negative for hearing loss, rhinorrhea and trouble swallowing.    Eyes:  Negative for  discharge and visual disturbance.   Respiratory:  Negative for chest tightness and wheezing.    Cardiovascular:  Negative for chest pain and palpitations.   Gastrointestinal:  Negative for blood in stool, constipation, diarrhea and vomiting.   Endocrine: Negative for polydipsia and polyuria.   Genitourinary:  Negative for difficulty urinating, dysuria, hematuria and menstrual problem.   Musculoskeletal:  Negative for arthralgias, joint swelling and neck pain.   Neurological:  Negative for weakness and headaches.   Psychiatric/Behavioral:  Negative for confusion and dysphoric mood.    All other systems reviewed and are negative.      Objective:      Physical Exam    There were no vitals filed for this visit.  There is no height or weight on file to calculate BMI.    RESULTS: Reviewed labs from last 12 months    Last Lab Results:     Lab Results   Component Value Date    WBC 6.93 10/31/2023    HGB 8.0 (L) 10/31/2023    HCT 28.8 (L) 10/31/2023     10/31/2023     10/31/2023    K 4.3 10/31/2023     (H) 10/31/2023    CO2 23 10/31/2023    BUN 16 10/31/2023    CREATININE 0.9 10/31/2023    CALCIUM 9.8 10/31/2023    ALBUMIN 3.4 (L) 10/31/2023    AST 16 10/31/2023    ALT 14 10/31/2023    CHOL 211 (H) 10/31/2023    TRIG 165 (H) 10/31/2023    HDL 62 10/31/2023    LDLCALC 116.0 10/31/2023    HGBA1C 6.1 (H) 10/31/2023    TSH 3.624 10/31/2023       Assessment:       1. Iron deficiency anemia due to chronic blood loss    2. Vitamin D deficiency    3. Prediabetes        Plan:   Umer was seen today for results.    Diagnoses and all orders for this visit:    Iron deficiency anemia due to chronic blood loss  -     ferrous sulfate (IRON) 325 mg (65 mg iron) Tab tablet; Take 1 tablet (325 mg total) by mouth daily with breakfast. Take with vit  C (OJ) each day  -     CBC Auto Differential; Future  -     Comprehensive Metabolic Panel; Future  -     Iron and TIBC; Future  -     Ferritin; Future    See HPI. Pt reports  heavy periods, was just started on progesterone by her Ob-Gyn for this. Will take iron as above, will recheck in 2-3 months.    Vitamin D deficiency  -     Vitamin D; Future    Substituted, will monitor level.    Prediabetes  -     Hemoglobin A1C; Future    Pt trying to have a good diet, will monitor.    No follow-ups on file.    This note was created by combination of typed  and M-Modal dictation.  Transcription errors may be present.  If there are any questions, please contact me.

## 2023-12-15 ENCOUNTER — OFFICE VISIT (OUTPATIENT)
Dept: FAMILY MEDICINE | Facility: CLINIC | Age: 43
End: 2023-12-15
Payer: COMMERCIAL

## 2023-12-15 DIAGNOSIS — R73.03 PREDIABETES: ICD-10-CM

## 2023-12-15 DIAGNOSIS — D50.0 IRON DEFICIENCY ANEMIA DUE TO CHRONIC BLOOD LOSS: Primary | ICD-10-CM

## 2023-12-15 DIAGNOSIS — E55.9 VITAMIN D DEFICIENCY: ICD-10-CM

## 2023-12-15 PROCEDURE — 1160F PR REVIEW ALL MEDS BY PRESCRIBER/CLIN PHARMACIST DOCUMENTED: ICD-10-PCS | Mod: CPTII,95,, | Performed by: INTERNAL MEDICINE

## 2023-12-15 PROCEDURE — 1159F PR MEDICATION LIST DOCUMENTED IN MEDICAL RECORD: ICD-10-PCS | Mod: CPTII,95,, | Performed by: INTERNAL MEDICINE

## 2023-12-15 PROCEDURE — 1160F RVW MEDS BY RX/DR IN RCRD: CPT | Mod: CPTII,95,, | Performed by: INTERNAL MEDICINE

## 2023-12-15 PROCEDURE — 3044F HG A1C LEVEL LT 7.0%: CPT | Mod: CPTII,95,, | Performed by: INTERNAL MEDICINE

## 2023-12-15 PROCEDURE — 1159F MED LIST DOCD IN RCRD: CPT | Mod: CPTII,95,, | Performed by: INTERNAL MEDICINE

## 2023-12-15 PROCEDURE — 99214 OFFICE O/P EST MOD 30 MIN: CPT | Mod: 95,,, | Performed by: INTERNAL MEDICINE

## 2023-12-15 PROCEDURE — 99214 PR OFFICE/OUTPT VISIT, EST, LEVL IV, 30-39 MIN: ICD-10-PCS | Mod: 95,,, | Performed by: INTERNAL MEDICINE

## 2023-12-15 PROCEDURE — 3044F PR MOST RECENT HEMOGLOBIN A1C LEVEL <7.0%: ICD-10-PCS | Mod: CPTII,95,, | Performed by: INTERNAL MEDICINE

## 2023-12-15 RX ORDER — FERROUS SULFATE 325(65) MG
325 TABLET ORAL
Qty: 90 TABLET | Refills: 0 | Status: SHIPPED | OUTPATIENT
Start: 2023-12-15 | End: 2024-03-14

## 2024-02-04 ENCOUNTER — PATIENT MESSAGE (OUTPATIENT)
Dept: FAMILY MEDICINE | Facility: CLINIC | Age: 44
End: 2024-02-04
Payer: COMMERCIAL

## 2024-02-09 DIAGNOSIS — E55.9 VITAMIN D DEFICIENCY: ICD-10-CM

## 2024-02-09 RX ORDER — ERGOCALCIFEROL 1.25 MG/1
50000 CAPSULE ORAL
Qty: 12 CAPSULE | Refills: 0 | Status: SHIPPED | OUTPATIENT
Start: 2024-02-09 | End: 2024-04-22 | Stop reason: SDUPTHER

## 2024-02-09 NOTE — TELEPHONE ENCOUNTER
No care due was identified.  [employer] Embedded Care Due Messages. Reference number: [id number].   2/09/2024 4:04:29 AM CST

## 2024-03-17 ENCOUNTER — PATIENT MESSAGE (OUTPATIENT)
Dept: OBSTETRICS AND GYNECOLOGY | Facility: CLINIC | Age: 44
End: 2024-03-17
Payer: COMMERCIAL

## 2024-03-18 DIAGNOSIS — Z01.419 ENCOUNTER FOR GYNECOLOGICAL EXAMINATION WITHOUT ABNORMAL FINDING: ICD-10-CM

## 2024-03-20 ENCOUNTER — PATIENT MESSAGE (OUTPATIENT)
Dept: OBSTETRICS AND GYNECOLOGY | Facility: CLINIC | Age: 44
End: 2024-03-20
Payer: COMMERCIAL

## 2024-03-20 RX ORDER — DROSPIRENONE AND ETHINYL ESTRADIOL 0.02-3(28)
1 KIT ORAL DAILY
Qty: 30 TABLET | Refills: 1 | Status: SHIPPED | OUTPATIENT
Start: 2024-03-20 | End: 2024-05-15

## 2024-04-08 ENCOUNTER — OFFICE VISIT (OUTPATIENT)
Dept: FAMILY MEDICINE | Facility: CLINIC | Age: 44
End: 2024-04-08
Payer: COMMERCIAL

## 2024-04-08 ENCOUNTER — LAB VISIT (OUTPATIENT)
Dept: LAB | Facility: HOSPITAL | Age: 44
End: 2024-04-08
Attending: INTERNAL MEDICINE
Payer: COMMERCIAL

## 2024-04-08 VITALS
SYSTOLIC BLOOD PRESSURE: 122 MMHG | HEIGHT: 61 IN | TEMPERATURE: 98 F | HEART RATE: 65 BPM | WEIGHT: 171.94 LBS | BODY MASS INDEX: 32.46 KG/M2 | DIASTOLIC BLOOD PRESSURE: 72 MMHG | RESPIRATION RATE: 16 BRPM | OXYGEN SATURATION: 98 %

## 2024-04-08 DIAGNOSIS — Q05.7 SPINA BIFIDA OF LUMBOSACRAL REGION WITHOUT HYDROCEPHALUS: ICD-10-CM

## 2024-04-08 DIAGNOSIS — E55.9 VITAMIN D DEFICIENCY: ICD-10-CM

## 2024-04-08 DIAGNOSIS — E66.09 CLASS 1 OBESITY DUE TO EXCESS CALORIES WITH SERIOUS COMORBIDITY AND BODY MASS INDEX (BMI) OF 32.0 TO 32.9 IN ADULT: ICD-10-CM

## 2024-04-08 DIAGNOSIS — N62 MACROMASTIA: ICD-10-CM

## 2024-04-08 DIAGNOSIS — R73.03 PREDIABETES: ICD-10-CM

## 2024-04-08 DIAGNOSIS — Z23 NEED FOR INFLUENZA VACCINATION: ICD-10-CM

## 2024-04-08 DIAGNOSIS — D50.9 IRON DEFICIENCY ANEMIA, UNSPECIFIED IRON DEFICIENCY ANEMIA TYPE: ICD-10-CM

## 2024-04-08 DIAGNOSIS — J30.1 SEASONAL ALLERGIC RHINITIS DUE TO POLLEN: ICD-10-CM

## 2024-04-08 DIAGNOSIS — D50.0 IRON DEFICIENCY ANEMIA DUE TO CHRONIC BLOOD LOSS: ICD-10-CM

## 2024-04-08 DIAGNOSIS — R73.03 PREDIABETES: Primary | ICD-10-CM

## 2024-04-08 LAB
25(OH)D3+25(OH)D2 SERPL-MCNC: 30 NG/ML (ref 30–96)
ALBUMIN SERPL BCP-MCNC: 3.4 G/DL (ref 3.5–5.2)
ALP SERPL-CCNC: 60 U/L (ref 55–135)
ALT SERPL W/O P-5'-P-CCNC: 18 U/L (ref 10–44)
ANION GAP SERPL CALC-SCNC: 8 MMOL/L (ref 8–16)
AST SERPL-CCNC: 15 U/L (ref 10–40)
BASOPHILS # BLD AUTO: 0.03 K/UL (ref 0–0.2)
BASOPHILS NFR BLD: 0.3 % (ref 0–1.9)
BILIRUB SERPL-MCNC: 0.3 MG/DL (ref 0.1–1)
BUN SERPL-MCNC: 18 MG/DL (ref 6–20)
CALCIUM SERPL-MCNC: 9.6 MG/DL (ref 8.7–10.5)
CHLORIDE SERPL-SCNC: 111 MMOL/L (ref 95–110)
CO2 SERPL-SCNC: 21 MMOL/L (ref 23–29)
CREAT SERPL-MCNC: 0.9 MG/DL (ref 0.5–1.4)
DIFFERENTIAL METHOD BLD: ABNORMAL
EOSINOPHIL # BLD AUTO: 0.1 K/UL (ref 0–0.5)
EOSINOPHIL NFR BLD: 0.9 % (ref 0–8)
ERYTHROCYTE [DISTWIDTH] IN BLOOD BY AUTOMATED COUNT: 16.4 % (ref 11.5–14.5)
EST. GFR  (NO RACE VARIABLE): >60 ML/MIN/1.73 M^2
ESTIMATED AVG GLUCOSE: 105 MG/DL (ref 68–131)
FERRITIN SERPL-MCNC: 19 NG/ML (ref 20–300)
GLUCOSE SERPL-MCNC: 102 MG/DL (ref 70–110)
HBA1C MFR BLD: 5.3 % (ref 4–5.6)
HCT VFR BLD AUTO: 35.9 % (ref 37–48.5)
HGB BLD-MCNC: 10.6 G/DL (ref 12–16)
IMM GRANULOCYTES # BLD AUTO: 0.04 K/UL (ref 0–0.04)
IMM GRANULOCYTES NFR BLD AUTO: 0.4 % (ref 0–0.5)
IRON SERPL-MCNC: 17 UG/DL (ref 30–160)
LYMPHOCYTES # BLD AUTO: 1.9 K/UL (ref 1–4.8)
LYMPHOCYTES NFR BLD: 19.6 % (ref 18–48)
MCH RBC QN AUTO: 27.3 PG (ref 27–31)
MCHC RBC AUTO-ENTMCNC: 29.5 G/DL (ref 32–36)
MCV RBC AUTO: 93 FL (ref 82–98)
MONOCYTES # BLD AUTO: 0.6 K/UL (ref 0.3–1)
MONOCYTES NFR BLD: 6.5 % (ref 4–15)
NEUTROPHILS # BLD AUTO: 7 K/UL (ref 1.8–7.7)
NEUTROPHILS NFR BLD: 72.3 % (ref 38–73)
NRBC BLD-RTO: 0 /100 WBC
PLATELET # BLD AUTO: 346 K/UL (ref 150–450)
PMV BLD AUTO: 10.7 FL (ref 9.2–12.9)
POTASSIUM SERPL-SCNC: 4.4 MMOL/L (ref 3.5–5.1)
PROT SERPL-MCNC: 6.9 G/DL (ref 6–8.4)
RBC # BLD AUTO: 3.88 M/UL (ref 4–5.4)
SATURATED IRON: 3 % (ref 20–50)
SODIUM SERPL-SCNC: 140 MMOL/L (ref 136–145)
TOTAL IRON BINDING CAPACITY: 525 UG/DL (ref 250–450)
TRANSFERRIN SERPL-MCNC: 355 MG/DL (ref 200–375)
WBC # BLD AUTO: 9.68 K/UL (ref 3.9–12.7)

## 2024-04-08 PROCEDURE — 83540 ASSAY OF IRON: CPT | Performed by: INTERNAL MEDICINE

## 2024-04-08 PROCEDURE — 82306 VITAMIN D 25 HYDROXY: CPT | Performed by: INTERNAL MEDICINE

## 2024-04-08 PROCEDURE — 3008F BODY MASS INDEX DOCD: CPT | Mod: CPTII,S$GLB,, | Performed by: INTERNAL MEDICINE

## 2024-04-08 PROCEDURE — 80053 COMPREHEN METABOLIC PANEL: CPT | Performed by: INTERNAL MEDICINE

## 2024-04-08 PROCEDURE — 82728 ASSAY OF FERRITIN: CPT | Performed by: INTERNAL MEDICINE

## 2024-04-08 PROCEDURE — 3074F SYST BP LT 130 MM HG: CPT | Mod: CPTII,S$GLB,, | Performed by: INTERNAL MEDICINE

## 2024-04-08 PROCEDURE — 90471 IMMUNIZATION ADMIN: CPT | Mod: S$GLB,,, | Performed by: INTERNAL MEDICINE

## 2024-04-08 PROCEDURE — 36415 COLL VENOUS BLD VENIPUNCTURE: CPT | Mod: PO | Performed by: INTERNAL MEDICINE

## 2024-04-08 PROCEDURE — 90686 IIV4 VACC NO PRSV 0.5 ML IM: CPT | Mod: S$GLB,,, | Performed by: INTERNAL MEDICINE

## 2024-04-08 PROCEDURE — 3078F DIAST BP <80 MM HG: CPT | Mod: CPTII,S$GLB,, | Performed by: INTERNAL MEDICINE

## 2024-04-08 PROCEDURE — 99999 PR PBB SHADOW E&M-EST. PATIENT-LVL IV: CPT | Mod: PBBFAC,,, | Performed by: INTERNAL MEDICINE

## 2024-04-08 PROCEDURE — 83036 HEMOGLOBIN GLYCOSYLATED A1C: CPT | Performed by: INTERNAL MEDICINE

## 2024-04-08 PROCEDURE — 1160F RVW MEDS BY RX/DR IN RCRD: CPT | Mod: CPTII,S$GLB,, | Performed by: INTERNAL MEDICINE

## 2024-04-08 PROCEDURE — 85025 COMPLETE CBC W/AUTO DIFF WBC: CPT | Performed by: INTERNAL MEDICINE

## 2024-04-08 PROCEDURE — 99214 OFFICE O/P EST MOD 30 MIN: CPT | Mod: 25,S$GLB,, | Performed by: INTERNAL MEDICINE

## 2024-04-08 PROCEDURE — 1159F MED LIST DOCD IN RCRD: CPT | Mod: CPTII,S$GLB,, | Performed by: INTERNAL MEDICINE

## 2024-04-08 RX ORDER — FLUTICASONE PROPIONATE 50 MCG
1 SPRAY, SUSPENSION (ML) NASAL DAILY
Qty: 16 G | Refills: 0 | Status: SHIPPED | OUTPATIENT
Start: 2024-04-08 | End: 2024-05-05 | Stop reason: SDUPTHER

## 2024-04-08 RX ORDER — CETIRIZINE HYDROCHLORIDE 10 MG/1
10 TABLET ORAL DAILY
Qty: 90 TABLET | Refills: 1 | Status: SHIPPED | OUTPATIENT
Start: 2024-04-08

## 2024-04-08 NOTE — PROGRESS NOTES
Subjective:       Patient ID: Umer Rodriguez is a pleasant 43 y.o. Black or  female patient    Chief Complaint: Follow-up      Patient is a pt I saw last on 10/31/2023, see my last notes.    HPI:      Patient with past medical history as per list of problems below coming today for a regular follow-up visit.    She reports feeling globally fine, however has been struggling with eye issues.  She had discharge in the morning, was seen by Ophthalmologist not so long ago, was stated to needs glasses due to her being in front of her computer a lot.  She is going to see to be seen again today to follow up.    She reports allergic rhinitis, she took only one Benadryl pill.  Does not use Flonase,nor saline nasal spray.    She may have pain in regard of shoulders and upper back due to size of her breasts.  She is going to work at the [employer] service at , is happy about this, she will have a better work-life balance.    Patient Active Problem List   Diagnosis    ARNOL (iron deficiency anemia)    Toe contracture    Painful orthopaedic hardware    Short toes    Spina bifida of lumbosacral region without hydrocephalus    Tethered spinal cord    Detrusor instability         PAST MEDICAL HISTORY  Past Medical History:   Diagnosis Date    Allergy     Fibroids     ARNOL (iron deficiency anemia) 2014        PAST SURGICAL HISTORY:  Past Surgical History:   Procedure Laterality Date     SECTION  2013    CYSTOSCOPY N/A 2019    Procedure: CYSTOSCOPY;  Surgeon: Pollo Park MD;  Location: [address];  Service: Urology;  Laterality: N/A;  30 min/2nd floor OR    FOOT SURGERY Right     INJECTION OF BOTULINUM TOXIN TYPE A N/A 2019    Procedure: INJECTION, BOTULINUM TOXIN, TYPE A;  Surgeon: Pollo Park MD;  Location: [address];  Service: Urology;  Laterality: N/A;        SOCIAL HISTORY:   reports that she has never smoked. She has never used smokeless tobacco. She reports  current alcohol use. She reports that she does not use drugs.     FAMILY HISTORY:  Family History   Problem Relation Age of Onset    Hypertension Mother     Breast cancer Neg Hx     Colon cancer Neg Hx     Ovarian cancer Neg Hx     Hyperlipidemia Neg Hx     Heart disease Neg Hx     Diabetes Neg Hx     Cancer Neg Hx     Arthritis Neg Hx         ALLERGIES:   Review of patient's allergies indicates:   Allergen Reactions    Oxycodone Anaphylaxis       MEDICATIONS:    Current Outpatient Medications:     azelaic acid (AZELEX) 15 % gel, Apply topically 2 (two) times daily., Disp: , Rfl:     drospirenone-ethinyl estradioL (PHU) 3-0.02 mg per tablet, Take 1 tablet by mouth once daily., Disp: 30 tablet, Rfl: 1    ergocalciferol (ERGOCALCIFEROL) 50,000 unit Cap, TAKE 1 CAPSULE BY MOUTH EVERY 7 DAYS, Disp: 12 capsule, Rfl: 0    medroxyPROGESTERone (PROVERA) 10 MG tablet, Take 1 tablet (10 mg total) by mouth once daily., Disp: 30 tablet, Rfl: 11    BINAXNOW COVID-19 AG SELF TEST Kit, , Disp: , Rfl:     cetirizine (ZYRTEC) 10 MG tablet, Take 1 tablet (10 mg total) by mouth once daily., Disp: 90 tablet, Rfl: 1    fluticasone propionate (FLONASE) 50 mcg/actuation nasal spray, 1 spray (50 mcg total) by Each Nostril route once daily., Disp: 16 g, Rfl: 0    ibuprofen (ADVIL,MOTRIN) 800 MG tablet, Take 800 mg by mouth 2 (two) times daily as needed. (Patient not taking: Reported on 4/8/2024), Disp: , Rfl:     mupirocin (BACTROBAN) 2 % ointment, Apply topically. (Patient not taking: Reported on 4/8/2024), Disp: , Rfl:     nystatin (MYCOSTATIN) cream, Apply topically 2 (two) times daily. (Patient not taking: Reported on 4/8/2024), Disp: , Rfl:     Review of Systems   Constitutional:  Negative for activity change and unexpected weight change.   HENT:  Negative for hearing loss, rhinorrhea and trouble swallowing.    Eyes:  Positive for discharge and visual disturbance.   Respiratory:  Negative for chest tightness and wheezing.     Cardiovascular:  Negative for chest pain and palpitations.   Gastrointestinal:  Negative for blood in stool, constipation, diarrhea and vomiting.   Endocrine: Negative for polydipsia and polyuria.   Genitourinary:  Negative for difficulty urinating, dysuria, hematuria and menstrual problem.   Musculoskeletal:  Positive for back pain. Negative for arthralgias, joint swelling and neck pain.   Neurological:  Negative for weakness and headaches.   Psychiatric/Behavioral:  Negative for confusion and dysphoric mood.    All other systems reviewed and are negative.      Objective:      Physical Exam  Vitals and nursing note reviewed.   Constitutional:       Appearance: Normal appearance. She is well-developed.   HENT:      Right Ear: Tympanic membrane and external ear normal. There is no impacted cerumen.      Left Ear: Tympanic membrane and external ear normal. There is no impacted cerumen.   Eyes:      Conjunctiva/sclera: Conjunctivae normal.      Comments: Eyes mildly runny, possible swelling upper lids   Neck:      Thyroid: No thyromegaly.   Cardiovascular:      Rate and Rhythm: Normal rate and regular rhythm.      Pulses: Normal pulses.      Heart sounds: Normal heart sounds.   Pulmonary:      Effort: Pulmonary effort is normal. No respiratory distress.      Breath sounds: Normal breath sounds. No wheezing.   Abdominal:      General: Bowel sounds are normal.      Palpations: Abdomen is soft. There is no mass.      Tenderness: There is no abdominal tenderness.   Musculoskeletal:         General: Normal range of motion.      Cervical back: Normal range of motion and neck supple.   Lymphadenopathy:      Cervical: No cervical adenopathy.   Skin:     General: Skin is warm and dry.   Neurological:      Mental Status: She is alert and oriented to person, place, and time. Mental status is at baseline.   Psychiatric:         Mood and Affect: Mood normal.         Behavior: Behavior normal.         Thought Content: Thought  "content normal.         Judgment: Judgment normal.         Vitals:    24 0824   BP: 122/72   BP Location: Left arm   Patient Position: Sitting   BP Method: Medium (Manual)   Pulse: 65   Resp: 16   Temp: 98.1 °F (36.7 °C)   TempSrc: Oral   SpO2: 98%   Weight: 78 kg (171 lb 15.3 oz)   Height: 5' 1" (1.549 m)     Body mass index is 32.49 kg/m².    RESULTS: Reviewed labs from last 12 months    Last Lab Results:     Lab Results   Component Value Date    WBC 6.93 10/31/2023    HGB 8.0 (L) 10/31/2023    HCT 28.8 (L) 10/31/2023     10/31/2023     10/31/2023    K 4.3 10/31/2023     (H) 10/31/2023    CO2 23 10/31/2023    BUN 16 10/31/2023    CREATININE 0.9 10/31/2023    CALCIUM 9.8 10/31/2023    ALBUMIN 3.4 (L) 10/31/2023    AST 16 10/31/2023    ALT 14 10/31/2023    CHOL 211 (H) 10/31/2023    TRIG 165 (H) 10/31/2023    HDL 62 10/31/2023    LDLCALC 116.0 10/31/2023    HGBA1C 6.1 (H) 10/31/2023    TSH 3.624 10/31/2023         Assessment & Plan:       1. Prediabetes    Will monitor, blood work today.    2. Class 1 obesity due to excess calories with serious comorbidity and body mass index (BMI) of 32.0 to 32.9 in adult    We discussed weight issues and safe, effective ways of losing pounds, includin) diet:  low carbohydrate, low fat diet, stay away from fast food, fried and processed food, use whole grain, lot of fruits and vegetables, use healthy fat such as avocado, nuts and olive oil in reasonable quantity, stay away from sodas. Regular meals with lean proteins.  2) physical activity: ideally 150 min a week, with cardiovascular and resistance activity.  Patient was encouraged to set realistic attainable goals for weight loss, and we will follow up periodically.    3. Seasonal allergic rhinitis due to pollen  -     cetirizine (ZYRTEC) 10 MG tablet; Take 1 tablet (10 mg total) by mouth once daily.  Dispense: 90 tablet; Refill: 1  -     fluticasone propionate (FLONASE) 50 mcg/actuation nasal spray; " 1 spray (50 mcg total) by Each Nostril route once daily.  Dispense: 16 g; Refill: 0    Refill provided for antihistamine and Flonase, advised regarding saline nasal spray.    4. Macromastia  -     Ambulatory referral/consult to Plastic Surgery; Future; Expected date: 04/15/2024    Referral to plastic surgeon.    5. Iron deficiency anemia, unspecified iron deficiency anemia type    Follow-up OBGYN, will do iron studies.      6. Spina bifida of lumbosacral region without hydrocephalus    7. Need for influenza vaccination  -     Influenza - Quadrivalent *Preferred* (6 months+) (PF)       No follow-ups on file.    This note was created by combination of typed  and M-Modal dictation.  Transcription errors may be present.  If there are any questions, please contact me.

## 2024-04-22 DIAGNOSIS — E55.9 VITAMIN D DEFICIENCY: ICD-10-CM

## 2024-04-22 RX ORDER — ERGOCALCIFEROL 1.25 MG/1
50000 CAPSULE ORAL
Qty: 12 CAPSULE | Refills: 0 | Status: SHIPPED | OUTPATIENT
Start: 2024-04-22

## 2024-04-22 NOTE — TELEPHONE ENCOUNTER
No care due was identified.  [facility] Embedded Care Due Messages. Reference number: [id number].   4/22/2024 9:17:30 AM CDT

## 2024-05-05 DIAGNOSIS — J30.1 SEASONAL ALLERGIC RHINITIS DUE TO POLLEN: ICD-10-CM

## 2024-05-05 RX ORDER — FLUTICASONE PROPIONATE 50 MCG
SPRAY, SUSPENSION (ML) NASAL
Qty: 32 G | Refills: 2 | Status: SHIPPED | OUTPATIENT
Start: 2024-05-05

## 2024-05-05 NOTE — TELEPHONE ENCOUNTER
No care due was identified.  [facility] Embedded Care Due Messages. Reference number: [id number].   5/05/2024 4:05:58 AM CDT

## 2024-05-05 NOTE — TELEPHONE ENCOUNTER
Refill Routing Note   Medication(s) are not appropriate for processing by [facility] for the following reason(s):        New or recently adjusted medication    [facility] action(s):  Defer             Appointments  past 12m or future 3m with PCP    Date Provider   Last Visit   4/8/2024 Svitlana Becker MD   Next Visit   Visit date not found Svitlana Becker MD   ED visits in past 90 days: 0        Note composed:9:24 AM 05/05/2024

## 2024-05-15 ENCOUNTER — PATIENT MESSAGE (OUTPATIENT)
Dept: OBSTETRICS AND GYNECOLOGY | Facility: CLINIC | Age: 44
End: 2024-05-15
Payer: COMMERCIAL

## 2024-05-15 DIAGNOSIS — Z01.419 ENCOUNTER FOR GYNECOLOGICAL EXAMINATION WITHOUT ABNORMAL FINDING: ICD-10-CM

## 2024-05-15 RX ORDER — DROSPIRENONE AND ETHINYL ESTRADIOL 0.02-3(28)
1 KIT ORAL
Qty: 28 TABLET | Refills: 0 | Status: SHIPPED | OUTPATIENT
Start: 2024-05-15 | End: 2024-06-12

## 2024-05-30 DIAGNOSIS — D50.0 IRON DEFICIENCY ANEMIA DUE TO CHRONIC BLOOD LOSS: ICD-10-CM

## 2024-05-31 RX ORDER — FERROUS SULFATE TAB 325 MG (65 MG ELEMENTAL FE) 325 (65 FE) MG
TAB ORAL
Qty: 90 TABLET | Refills: 0 | Status: SHIPPED | OUTPATIENT
Start: 2024-05-31

## 2024-06-05 ENCOUNTER — OFFICE VISIT (OUTPATIENT)
Dept: PLASTIC SURGERY | Facility: CLINIC | Age: 44
End: 2024-06-05
Payer: COMMERCIAL

## 2024-06-05 VITALS
HEART RATE: 71 BPM | DIASTOLIC BLOOD PRESSURE: 64 MMHG | WEIGHT: 174 LBS | BODY MASS INDEX: 32.88 KG/M2 | SYSTOLIC BLOOD PRESSURE: 124 MMHG

## 2024-06-05 DIAGNOSIS — M25.511 CHRONIC PAIN IN RIGHT SHOULDER: ICD-10-CM

## 2024-06-05 DIAGNOSIS — M54.2 CERVICALGIA: ICD-10-CM

## 2024-06-05 DIAGNOSIS — G89.29 CHRONIC PAIN IN RIGHT SHOULDER: ICD-10-CM

## 2024-06-05 DIAGNOSIS — M25.512 CHRONIC PAIN IN LEFT SHOULDER: ICD-10-CM

## 2024-06-05 DIAGNOSIS — L30.4 ERYTHEMA INTERTRIGO: ICD-10-CM

## 2024-06-05 DIAGNOSIS — N62 HYPERTROPHY OF BREAST: Primary | ICD-10-CM

## 2024-06-05 DIAGNOSIS — G89.29 CHRONIC PAIN IN LEFT SHOULDER: ICD-10-CM

## 2024-06-05 DIAGNOSIS — N62 MACROMASTIA: ICD-10-CM

## 2024-06-05 PROCEDURE — 3074F SYST BP LT 130 MM HG: CPT | Mod: CPTII,S$GLB,, | Performed by: SURGERY

## 2024-06-05 PROCEDURE — 3008F BODY MASS INDEX DOCD: CPT | Mod: CPTII,S$GLB,, | Performed by: SURGERY

## 2024-06-05 PROCEDURE — 1159F MED LIST DOCD IN RCRD: CPT | Mod: CPTII,S$GLB,, | Performed by: SURGERY

## 2024-06-05 PROCEDURE — 3078F DIAST BP <80 MM HG: CPT | Mod: CPTII,S$GLB,, | Performed by: SURGERY

## 2024-06-05 PROCEDURE — 3044F HG A1C LEVEL LT 7.0%: CPT | Mod: CPTII,S$GLB,, | Performed by: SURGERY

## 2024-06-05 PROCEDURE — 99204 OFFICE O/P NEW MOD 45 MIN: CPT | Mod: S$GLB,,, | Performed by: SURGERY

## 2024-06-05 PROCEDURE — 99999 PR PBB SHADOW E&M-EST. PATIENT-LVL III: CPT | Mod: PBBFAC,,, | Performed by: SURGERY

## 2024-06-05 NOTE — PROGRESS NOTES
History & Physical    SUBJECTIVE:   Chief complaint: large breasts    History of Present Illness:    Umer Rodriguez presents to [facility] 2ND FLOOR on 2024 for evaluation for bilateral breast reduction secondary to symptomatic bilateral large pendulous breast. She has a chief complaint of chronic neck and back pain for 5 years. She has tried tylenol, ibuprofen, and physical therapy without alleviation of pain. She also complains of deep shoulder grooving from her bra straps as well as macerating rashes below each breast that have not significantly improved with application of medicated ointments and creams.  She currently reports a bra size of 38G. She is employed by [employer]. She denies smoking tobacco or the use of any nicotine containing products.       Past Medical History:   Diagnosis Date    Allergy     Fibroids     ARNOL (iron deficiency anemia) 2014       Past Surgical History:   Procedure Laterality Date     SECTION  2013    CYSTOSCOPY N/A 2019    Procedure: CYSTOSCOPY;  Surgeon: Pollo Park MD;  Location: [address];  Service: Urology;  Laterality: N/A;  30 min/2nd floor OR    FOOT SURGERY Right     INJECTION OF BOTULINUM TOXIN TYPE A N/A 2019    Procedure: INJECTION, BOTULINUM TOXIN, TYPE A;  Surgeon: Pollo Park MD;  Location: [facility] OR [address] [place] [address];  Service: Urology;  Laterality: N/A;       Family History   Problem Relation Name Age of Onset    Hypertension Mother      Breast cancer Neg Hx      Colon cancer Neg Hx      Ovarian cancer Neg Hx      Hyperlipidemia Neg Hx      Heart disease Neg Hx      Diabetes Neg Hx      Cancer Neg Hx      Arthritis Neg Hx         Social History     Socioeconomic History    Marital status: Single   Tobacco Use    Smoking status: Never    Smokeless tobacco: Never   Substance and Sexual Activity    Alcohol use: Yes     Comment: occasional    Drug use: No    Sexual activity: Yes     Partners: Male      Birth control/protection: None     Social Determinants of Health     Financial Resource Strain: Patient Declined (11/28/2023)    Overall Financial Resource Strain (CARDIA)     Difficulty of Paying Living Expenses: Patient declined   Food Insecurity: Patient Declined (11/28/2023)    Hunger Vital Sign     Worried About Running Out of Food in the Last Year: Patient declined     Ran Out of Food in the Last Year: Patient declined   Transportation Needs: Patient Declined (12/15/2023)    PRAPARE - Transportation     Lack of Transportation (Medical): Patient declined     Lack of Transportation (Non-Medical): Patient declined   Physical Activity: Unknown (12/15/2023)    Exercise Vital Sign     Days of Exercise per Week: Patient declined     Minutes of Exercise per Session: 60 min   Stress: Stress Concern Present (12/15/2023)    [employer] - Occupational Stress Questionnaire     Feeling of Stress : Very much   Housing Stability: Patient Declined (12/15/2023)    Housing Stability Vital Sign     Unable to Pay for Housing in the Last Year: Patient declined     Unstable Housing in the Last Year: Patient declined       Current Outpatient Medications   Medication Sig Dispense Refill    azelaic acid (AZELEX) 15 % gel Apply topically 2 (two) times daily.      cetirizine (ZYRTEC) 10 MG tablet Take 1 tablet (10 mg total) by mouth once daily. 90 tablet 1    drospirenone-ethinyl estradioL (PHU) 3-0.02 mg per tablet TAKE 1 TABLET BY MOUTH DAILY 28 tablet 0    ergocalciferol (ERGOCALCIFEROL) 50,000 unit Cap Take 1 capsule (50,000 Units total) by mouth every 7 days. 12 capsule 0    FEROSUL 325 mg (65 mg iron) Tab tablet TAKE 1 TABLET BY MOUTH DAILY WITH BREAKFAST. TAKE WITH VITAMIN-C(OJ) EACH DAY 90 tablet 0    fluticasone propionate (FLONASE) 50 mcg/actuation nasal spray SHAKE LIQUID AND USE 1 SPRAY(50 MCG) IN EACH NOSTRIL DAILY 32 g 2    medroxyPROGESTERone (PROVERA) 10 MG tablet Take 1 tablet (10 mg total)  by mouth once daily. 30 tablet 11    BINAXNOW COVID-19 AG SELF TEST Kit  (Patient not taking: Reported on 4/8/2024)      ibuprofen (ADVIL,MOTRIN) 800 MG tablet Take 800 mg by mouth 2 (two) times daily as needed. (Patient not taking: Reported on 4/8/2024)      mupirocin (BACTROBAN) 2 % ointment Apply topically. (Patient not taking: Reported on 4/8/2024)      nystatin (MYCOSTATIN) cream Apply topically 2 (two) times daily. (Patient not taking: Reported on 4/8/2024)       No current facility-administered medications for this visit.       Review of patient's allergies indicates:   Allergen Reactions    Oxycodone Anaphylaxis         Review of Systems:    Review of Systems   HENT: Positive for neck pain.    Musculoskeletal: Positive for back pain.   Neurological: Negative for headaches or dizziness      OBJECTIVE:     /64 (BP Location: Left arm, Patient Position: Sitting, BP Method: Medium (Automatic))   Pulse 71   Wt 78.9 kg (174 lb)   BMI 32.88 kg/m²       Physical Exam:    Physical Exam   Constitutional: She is oriented to person, place, and time. She appears well-developed and well-nourished.   Neck: Normal range of motion. Neck supple. No tracheal deviation present.   Cardiovascular: Normal rate, regular rhythm and normal heart sounds.    Pulmonary/Chest: Effort normal and breath sounds normal. bilaterally enlarged breasts, evidence of previous rashes, shoulder grooving, no palpable masses, nipple everted  Abdominal: Soft. Bowel sounds are normal.   Musculoskeletal: Normal range of motion.   Neurological: She is alert and oriented to person, place, and time.   Skin: Skin is warm.       Last MMG 2023    ASSESSMENT/PLAN:     1.Symptomatic Bilateral Macromastia  2. Chronic neck pain  3. Chronic back pain  4. Chronic breast rashes    PLAN:Plan    -Will need 700 gm reduction per side  -Will submit paper work for insurance approval  -Photos obtained  -Risk, benefits, and alternatives explained. She understands  that the risks include but are not limited to bleeding, scarring, infection, pain, numbness, asymmetry, deformity, open wound, skin necrosis, wound dehiscence, permanent or temporary loss of sensation to the nipple, partial or total nipple loss requiring removal, poor cosmetic outcome, hematoma, seroma and pulmonary emobolus.   - Patient would like to proceed with scheduling bilateral breast reduction pending insurance authorization    All questions were answered. The patient was advised to call the clinic with any questions or concerns prior to their next visit.     Lex Redd MD  Plastic Surgery Fellow  [phone] Office Phone number

## 2024-06-12 ENCOUNTER — PATIENT MESSAGE (OUTPATIENT)
Dept: OBSTETRICS AND GYNECOLOGY | Facility: CLINIC | Age: 44
End: 2024-06-12
Payer: COMMERCIAL

## 2024-06-12 DIAGNOSIS — Z01.419 ENCOUNTER FOR GYNECOLOGICAL EXAMINATION WITHOUT ABNORMAL FINDING: ICD-10-CM

## 2024-06-12 RX ORDER — DROSPIRENONE AND ETHINYL ESTRADIOL 0.02-3(28)
1 KIT ORAL
Qty: 28 TABLET | Refills: 0 | Status: SHIPPED | OUTPATIENT
Start: 2024-06-12

## 2024-06-13 DIAGNOSIS — Z12.11 COLON CANCER SCREENING: Primary | ICD-10-CM

## 2024-07-04 ENCOUNTER — PATIENT MESSAGE (OUTPATIENT)
Dept: PLASTIC SURGERY | Facility: CLINIC | Age: 44
End: 2024-07-04
Payer: COMMERCIAL

## 2024-08-05 ENCOUNTER — OFFICE VISIT (OUTPATIENT)
Dept: PODIATRY | Facility: CLINIC | Age: 44
End: 2024-08-05
Payer: COMMERCIAL

## 2024-08-05 VITALS
DIASTOLIC BLOOD PRESSURE: 74 MMHG | HEART RATE: 84 BPM | WEIGHT: 173.94 LBS | SYSTOLIC BLOOD PRESSURE: 116 MMHG | BODY MASS INDEX: 32.87 KG/M2

## 2024-08-05 DIAGNOSIS — M54.16 LUMBAR RADICULOPATHY, RIGHT: ICD-10-CM

## 2024-08-05 DIAGNOSIS — N62 MACROMASTIA: Primary | ICD-10-CM

## 2024-08-05 DIAGNOSIS — M25.512 CHRONIC PAIN IN LEFT SHOULDER: ICD-10-CM

## 2024-08-05 DIAGNOSIS — G89.29 CHRONIC PAIN IN RIGHT SHOULDER: ICD-10-CM

## 2024-08-05 DIAGNOSIS — M25.511 CHRONIC PAIN IN RIGHT SHOULDER: ICD-10-CM

## 2024-08-05 DIAGNOSIS — G89.29 CHRONIC PAIN IN LEFT SHOULDER: ICD-10-CM

## 2024-08-05 DIAGNOSIS — M79.672 LEFT FOOT PAIN: ICD-10-CM

## 2024-08-05 DIAGNOSIS — Z98.890 HISTORY OF FOOT SURGERY: Primary | ICD-10-CM

## 2024-08-05 PROCEDURE — 3044F HG A1C LEVEL LT 7.0%: CPT | Mod: CPTII,S$GLB,, | Performed by: PODIATRIST

## 2024-08-05 PROCEDURE — 3078F DIAST BP <80 MM HG: CPT | Mod: CPTII,S$GLB,, | Performed by: PODIATRIST

## 2024-08-05 PROCEDURE — 1160F RVW MEDS BY RX/DR IN RCRD: CPT | Mod: CPTII,S$GLB,, | Performed by: PODIATRIST

## 2024-08-05 PROCEDURE — 1159F MED LIST DOCD IN RCRD: CPT | Mod: CPTII,S$GLB,, | Performed by: PODIATRIST

## 2024-08-05 PROCEDURE — 99999 PR PBB SHADOW E&M-EST. PATIENT-LVL IV: CPT | Mod: PBBFAC,,, | Performed by: PODIATRIST

## 2024-08-05 PROCEDURE — 3074F SYST BP LT 130 MM HG: CPT | Mod: CPTII,S$GLB,, | Performed by: PODIATRIST

## 2024-08-05 PROCEDURE — 3008F BODY MASS INDEX DOCD: CPT | Mod: CPTII,S$GLB,, | Performed by: PODIATRIST

## 2024-08-05 PROCEDURE — 99203 OFFICE O/P NEW LOW 30 MIN: CPT | Mod: S$GLB,,, | Performed by: PODIATRIST

## 2024-08-09 ENCOUNTER — HOSPITAL ENCOUNTER (OUTPATIENT)
Dept: RADIOLOGY | Facility: HOSPITAL | Age: 44
Discharge: HOME | End: 2024-08-09
Attending: PODIATRIST
Payer: COMMERCIAL

## 2024-08-09 DIAGNOSIS — Z98.890 HISTORY OF FOOT SURGERY: ICD-10-CM

## 2024-08-09 PROCEDURE — 73630 X-RAY EXAM OF FOOT: CPT | Mod: 26,RT,, | Performed by: RADIOLOGY

## 2024-08-09 PROCEDURE — 73630 X-RAY EXAM OF FOOT: CPT | Mod: TC,RT

## 2024-08-12 ENCOUNTER — PATIENT MESSAGE (OUTPATIENT)
Dept: PODIATRY | Facility: CLINIC | Age: 44
End: 2024-08-12
Payer: COMMERCIAL

## 2024-08-22 ENCOUNTER — TELEPHONE (OUTPATIENT)
Dept: ENDOSCOPY | Facility: HOSPITAL | Age: 44
End: 2024-08-22

## 2024-08-22 ENCOUNTER — CLINICAL SUPPORT (OUTPATIENT)
Dept: ENDOSCOPY | Facility: HOSPITAL | Age: 44
End: 2024-08-22
Attending: INTERNAL MEDICINE
Payer: COMMERCIAL

## 2024-08-22 VITALS — WEIGHT: 173 LBS | HEIGHT: 61 IN | BODY MASS INDEX: 32.66 KG/M2

## 2024-08-22 DIAGNOSIS — Z12.11 COLON CANCER SCREENING: ICD-10-CM

## 2024-08-22 RX ORDER — SODIUM, POTASSIUM,MAG SULFATES 17.5-3.13G
1 SOLUTION, RECONSTITUTED, ORAL ORAL DAILY
Qty: 1 KIT | Refills: 0 | Status: SHIPPED | OUTPATIENT
Start: 2024-08-22 | End: 2024-08-24

## 2024-08-22 NOTE — TELEPHONE ENCOUNTER
Spoke to pt to schedule procedure(s) Colonoscopy       Physician to perform procedure(s) Dr. Andrade  Date of Procedure (s) 10/21/24  Arrival Time 12:30 PM  Time of Procedure(s) 1:30 PM   Location of Procedure(s) [address]   Type of Rx Prep sent to patient: Suprep  Instructions provided to patient via [facility]    Patient was informed on the following information and verbalized understanding. Screening questionnaire reviewed with patient and complete. If procedure requires anesthesia, a responsible adult needs to be present to accompany the patient home, patient cannot drive after receiving anesthesia. Appointment details are tentative, especially check-in time. Patient will receive a prep-op call 7 days prior to confirm check-in time for procedure. If applicable the patient should contact their pharmacy to verify Rx for procedure prep is ready for pick-up. Patient was advised to call the scheduling department at [phone] if pharmacy states no Rx is available. Patient was advised to call the endoscopy scheduling department if any questions or concerns arise.      [facility] Scheduling Department    Patient denies taking any anticoagulants, antiplatelets, GLP-1 medications, or Adipex (Phentermine).

## 2024-08-23 ENCOUNTER — OFFICE VISIT (OUTPATIENT)
Dept: OBSTETRICS AND GYNECOLOGY | Facility: CLINIC | Age: 44
End: 2024-08-23
Payer: COMMERCIAL

## 2024-08-23 VITALS
BODY MASS INDEX: 34.37 KG/M2 | SYSTOLIC BLOOD PRESSURE: 110 MMHG | WEIGHT: 181.88 LBS | DIASTOLIC BLOOD PRESSURE: 80 MMHG

## 2024-08-23 DIAGNOSIS — Z01.419 ENCOUNTER FOR GYNECOLOGICAL EXAMINATION WITHOUT ABNORMAL FINDING: Primary | ICD-10-CM

## 2024-08-23 DIAGNOSIS — Z12.31 BREAST CANCER SCREENING BY MAMMOGRAM: ICD-10-CM

## 2024-08-23 DIAGNOSIS — N93.9 ABNORMAL UTERINE BLEEDING (AUB): ICD-10-CM

## 2024-08-23 PROCEDURE — 99999 PR PBB SHADOW E&M-EST. PATIENT-LVL III: CPT | Mod: PBBFAC,,, | Performed by: OBSTETRICS & GYNECOLOGY

## 2024-08-23 RX ORDER — DROSPIRENONE AND ETHINYL ESTRADIOL 0.02-3(28)
1 KIT ORAL DAILY
Qty: 84 TABLET | Refills: 4 | Status: SHIPPED | OUTPATIENT
Start: 2024-08-23

## 2024-08-23 NOTE — PROGRESS NOTES
Subjective:       Patient ID: Umer Rodriguez is a 44 y.o. female.    Chief Complaint:  Well Woman      History of Present Illness  HPI  Annual Exam-Premenopausal  Patient presents for annual exam. The patient has no complaints today. The patient is sexually active. GYN screening history: last pap: was normal and last mammogram: was normal. The patient wears seatbelts: yes. The patient participates in regular exercise: yes. Has the patient ever been transfused or tattooed?: yes. The patient reports that there is not domestic violence in her life.    Has known history of uterine fibroids.  Still having irregular bleeding.  Reports that OCPs had helped previously and would like to try that again.    GYN & OB History  Patient's last menstrual period was 2024 (exact date).   Date of Last Pap: 2024    OB History    Para Term  AB Living   3 3 2 1   2   SAB IAB Ectopic Multiple Live Births           2      # Outcome Date GA Lbr Diogo/2nd Weight Sex Type Anes PTL Lv   3  13   2.495 kg (5 lb 8 oz) M CS-Unspec   VALERIE   2 Term 12/08/10 40w0d  3.856 kg (8 lb 8 oz) F CS-LTranv   VALERIE   1 Term              Past Medical History:  Past Medical History:   Diagnosis Date    Allergy     Fibroids     ARNOL (iron deficiency anemia) 2014       Past Surgical History:  Past Surgical History:   Procedure Laterality Date     SECTION  2013    CYSTOSCOPY N/A 2019    Procedure: CYSTOSCOPY;  Surgeon: Pollo Park MD;  Location: [address];  Service: Urology;  Laterality: N/A;  30 min/2nd floor OR    FOOT SURGERY Right     INJECTION OF BOTULINUM TOXIN TYPE A N/A 2019    Procedure: INJECTION, BOTULINUM TOXIN, TYPE A;  Surgeon: Pollo Park MD;  Location: [facility] OR [address] [place] [address];  Service: Urology;  Laterality: N/A;       Family History:  Family History   Problem Relation Name Age of Onset    Hypertension Mother      Breast cancer Neg Hx      Colon cancer Neg Hx      Ovarian  cancer Neg Hx      Hyperlipidemia Neg Hx      Heart disease Neg Hx      Diabetes Neg Hx      Cancer Neg Hx      Arthritis Neg Hx         Allergies:  Review of patient's allergies indicates:   Allergen Reactions    Oxycodone Anaphylaxis       Medications:  Current Outpatient Medications on File Prior to Visit   Medication Sig Dispense Refill    azelaic acid (AZELEX) 15 % gel Apply topically 2 (two) times daily.      cetirizine (ZYRTEC) 10 MG tablet Take 1 tablet (10 mg total) by mouth once daily. 90 tablet 1    ergocalciferol (ERGOCALCIFEROL) 50,000 unit Cap Take 1 capsule (50,000 Units total) by mouth every 7 days. 12 capsule 0    FEROSUL 325 mg (65 mg iron) Tab tablet TAKE 1 TABLET BY MOUTH DAILY WITH BREAKFAST. TAKE WITH VITAMIN-C(OJ) EACH DAY 90 tablet 0    fluticasone propionate (FLONASE) 50 mcg/actuation nasal spray SHAKE LIQUID AND USE 1 SPRAY(50 MCG) IN EACH NOSTRIL DAILY 32 g 2    ibuprofen (ADVIL,MOTRIN) 800 MG tablet Take 800 mg by mouth 2 (two) times daily as needed.      medroxyPROGESTERone (PROVERA) 10 MG tablet Take 1 tablet (10 mg total) by mouth once daily. 30 tablet 11    mupirocin (BACTROBAN) 2 % ointment Apply topically.      nystatin (MYCOSTATIN) cream Apply topically 2 (two) times daily.      sodium,potassium,mag sulfates (SUPREP BOWEL PREP KIT) 17.5-3.13-1.6 gram SolR Take 177 mLs by mouth once daily. for 2 days 1 kit 0    [DISCONTINUED] drospirenone-ethinyl estradioL (PHU) 3-0.02 mg per tablet TAKE 1 TABLET BY MOUTH DAILY 28 tablet 0     No current facility-administered medications on file prior to visit.       Social History:  Social History     Tobacco Use    Smoking status: Never    Smokeless tobacco: Never   Substance Use Topics    Alcohol use: Yes     Comment: occasional    Drug use: No            Review of Systems  Review of Systems   Constitutional: Negative.    HENT: Negative.     Eyes: Negative.    Respiratory: Negative.     Cardiovascular: Negative.    Gastrointestinal: Negative.     Endocrine: Negative.    Genitourinary: Negative.    Musculoskeletal: Negative.    Integumentary:  Negative.   Neurological: Negative.    Hematological: Negative.    Psychiatric/Behavioral: Negative.     Breast: negative.            Objective:    Physical Exam:   Constitutional: She is oriented to person, place, and time. She appears well-nourished.    HENT:   Head: Normocephalic and atraumatic.    Eyes: EOM are normal. Right eye exhibits normal extraocular motion. Left eye exhibits normal extraocular motion.    Neck: No thyromegaly present.    Cardiovascular:  Normal rate.             Pulmonary/Chest: Effort normal. No respiratory distress. Right breast exhibits no mass, no skin change and no tenderness. Left breast exhibits no mass, no skin change and no tenderness. Breasts are symmetrical.        Abdominal: Soft. She exhibits no distension and no mass. There is no abdominal tenderness.     Genitourinary:    Vagina, uterus, right adnexa and left adnexa normal.      Pelvic exam was performed with patient supine.   The external female genitalia was normal.   No external genitalia lesions identified,Genitalia hair distrobution normal .     Labial bartholins normal.There is no rash or lesion on the right labia. There is no rash or lesion on the left labia. Cervix is normal. Right adnexum displays no tenderness and no fullness. Left adnexum displays no tenderness and no fullness. No vaginal discharge or bleeding in the vagina.    No signs of injury in the vagina.   Vagina was moist.Cervix exhibits no motion tenderness and no friability. Uterus consistancy normal and Uerus contour normal  Uterus is not tender. Normal urethral meatus.Urethral Meatus exhibits: urethral lesionUrethra findings: no urethral mass, no tenderness and prolapsedBladder findings: no bladder distention and no bladder tenderness          Musculoskeletal: Normal range of motion.      Lymphadenopathy:     She has no cervical adenopathy.     Neurological: She is oriented to person, place, and time.   Cranial Nerves II-XII grossly intact.    Skin: No rash noted. No erythema.    Psychiatric: She has a normal mood and affect. Her behavior is normal.          Assessment:        1. Encounter for gynecological examination without abnormal finding    2. Breast cancer screening by mammogram    3. Abnormal uterine bleeding (AUB)                Plan:      1. Encounter for gynecological examination without abnormal finding  - Pap done today.  -   Screening tests as ordered.  - Diet and exercise encouraged.    Counseling: injury prevention: Driving under the influence of alcohol  Seatbelts  Stress management techniques  indications for and frequency of periodic gynecologic exam  reviewed current Pap guidelines. Explained new understanding of natural history of cervical disease and improved Paps. Recommended guideline concordant care.  - drospirenone-ethinyl estradioL (PHU) 3-0.02 mg per tablet; Take 1 tablet by mouth once daily.  Dispense: 84 tablet; Refill: 4    2. Breast cancer screening by mammogram  - Self breast exams encouraged  - Mammo Digital Screening Bilat w/ Lev; Future    3. Abnormal uterine bleeding (AUB)  - Pelvic ultrasound ordered.  Will call patient with results.  - Patient instructed to call office if she has not heard anything 2 wks after ultrasound.  - US Pelvis Comp with Transvag NON-OB (xpd; Future  - drospirenone-ethinyl estradioL (PHU) 3-0.02 mg per tablet; Take 1 tablet by mouth once daily.  Dispense: 84 tablet; Refill: 4

## 2024-08-25 ENCOUNTER — PATIENT MESSAGE (OUTPATIENT)
Dept: OBSTETRICS AND GYNECOLOGY | Facility: CLINIC | Age: 44
End: 2024-08-25
Payer: COMMERCIAL

## 2024-08-25 DIAGNOSIS — N94.6 DYSMENORRHEA: ICD-10-CM

## 2024-08-25 DIAGNOSIS — N93.9 ABNORMAL UTERINE BLEEDING (AUB): Primary | ICD-10-CM

## 2024-08-27 DIAGNOSIS — E55.9 VITAMIN D DEFICIENCY: ICD-10-CM

## 2024-08-27 RX ORDER — ERGOCALCIFEROL 1.25 MG/1
50000 CAPSULE ORAL
Qty: 12 CAPSULE | Refills: 0 | Status: SHIPPED | OUTPATIENT
Start: 2024-08-27

## 2024-08-27 NOTE — PROGRESS NOTES
Subjective:       Patient ID: Umer Rodriguez is a pleasant 44 y.o. Black or  female patient    Chief Complaint: Follow-up      Patient is a pt I saw last on  2024, see my last notes.    HPI:     Pt with PMH as per list of problems below coming today for a regular f-up visit.   From last encounter:  - 2024 ED for allergic rhinitis  - Plastic Surgery (Dr. Lugo) for macromastia.  - Podiatry  - Ob-Gyn  She is scheduled to have her colonoscopy on 10/21/2024 and mammoplasty, reduction, on 2024.  She comes today reporting that she may have to reschedule the breast surgery as she may warrant a hysterectomy to bleeding and cramping for 3 weeks.  She follows up with Dr. Timmons.  Is going to have a pelvic U.S..  She will then assess the situation.  She is still taking iron pills each day.    She is fine to do blood work today to assess evolution.    She endorses stressors mainly due to her dad being sick with cancer, on treatment for possible carcinoid cancer  She starts to cry talking about this.    She may take some break and go on vacation in [facility] in October.    She wonders about semaglutide for weight loss.  Reports not eating a lot.  Relates weight gain to stress.       Patient Active Problem List   Diagnosis    ARNOL (iron deficiency anemia)    Toe contracture    Painful orthopaedic hardware    Short toes    Spina bifida of lumbosacral region without hydrocephalus    Tethered spinal cord    Detrusor instability    Macromastia         PAST MEDICAL HISTORY  Past Medical History:   Diagnosis Date    Allergy     Fibroids     ARNOL (iron deficiency anemia) 2014        PAST SURGICAL HISTORY:  Past Surgical History:   Procedure Laterality Date     SECTION  2013    CYSTOSCOPY N/A 2019    Procedure: CYSTOSCOPY;  Surgeon: Pollo Park MD;  Location: [facility] OR [address] [place] [address];  Service: Urology;  Laterality: N/A;  30 min/2nd floor OR    FOOT SURGERY Right     INJECTION OF  BOTULINUM TOXIN TYPE A N/A 2/20/2019    Procedure: INJECTION, BOTULINUM TOXIN, TYPE A;  Surgeon: Pollo Park MD;  Location: [facility] OR [address] [place] [address];  Service: Urology;  Laterality: N/A;        SOCIAL HISTORY:   reports that she has never smoked. She has never used smokeless tobacco. She reports current alcohol use. She reports that she does not use drugs.     FAMILY HISTORY:  Family History   Problem Relation Name Age of Onset    Hypertension Mother      Breast cancer Neg Hx      Colon cancer Neg Hx      Ovarian cancer Neg Hx      Hyperlipidemia Neg Hx      Heart disease Neg Hx      Diabetes Neg Hx      Cancer Neg Hx      Arthritis Neg Hx          ALLERGIES:   Review of patient's allergies indicates:   Allergen Reactions    Oxycodone Anaphylaxis       MEDICATIONS:    Current Outpatient Medications:     cetirizine (ZYRTEC) 10 MG tablet, Take 1 tablet (10 mg total) by mouth once daily., Disp: 90 tablet, Rfl: 1    drospirenone-ethinyl estradioL (PHU) 3-0.02 mg per tablet, Take 1 tablet by mouth once daily., Disp: 84 tablet, Rfl: 4    ergocalciferol (ERGOCALCIFEROL) 50,000 unit Cap, Take 1 capsule (50,000 Units total) by mouth every 7 days., Disp: 12 capsule, Rfl: 0    fluticasone propionate (FLONASE) 50 mcg/actuation nasal spray, SHAKE LIQUID AND USE 1 SPRAY(50 MCG) IN EACH NOSTRIL DAILY, Disp: 32 g, Rfl: 2    ibuprofen (ADVIL,MOTRIN) 800 MG tablet, Take 800 mg by mouth 2 (two) times daily as needed., Disp: , Rfl:     ferrous sulfate (FEROSUL) 325 mg (65 mg iron) Tab tablet, Take 1 tablet (325 mg total) by mouth once daily., Disp: 90 tablet, Rfl: 0    Review of Systems   Constitutional:  Positive for unexpected weight change. Negative for activity change.   HENT:  Negative for hearing loss, rhinorrhea and trouble swallowing.    Eyes:  Negative for discharge and visual disturbance.   Respiratory:  Negative for chest tightness and wheezing.    Cardiovascular:  Negative for chest pain and palpitations.   Gastrointestinal:   Negative for blood in stool, constipation, diarrhea and vomiting.   Endocrine: Negative for polydipsia and polyuria.   Genitourinary:  Positive for menstrual problem and vaginal bleeding. Negative for difficulty urinating, dysuria and hematuria.   Musculoskeletal:  Negative for arthralgias, back pain, joint swelling and neck pain.   Neurological:  Negative for weakness and headaches.   Psychiatric/Behavioral:  Negative for confusion and dysphoric mood.    All other systems reviewed and are negative.      Objective:      Physical Exam  Vitals reviewed.   Constitutional:       Appearance: Normal appearance. She is well-developed.   HENT:      Right Ear: Tympanic membrane and external ear normal. There is no impacted cerumen.      Left Ear: Tympanic membrane and external ear normal. There is no impacted cerumen.   Eyes:      Conjunctiva/sclera: Conjunctivae normal.   Neck:      Thyroid: No thyromegaly.   Cardiovascular:      Rate and Rhythm: Normal rate and regular rhythm.      Pulses: Normal pulses.      Heart sounds: Normal heart sounds.   Pulmonary:      Effort: Pulmonary effort is normal. No respiratory distress.      Breath sounds: Normal breath sounds. No wheezing.   Abdominal:      General: Bowel sounds are normal.      Palpations: Abdomen is soft. There is no mass.      Tenderness: There is no abdominal tenderness.   Musculoskeletal:         General: Normal range of motion.      Cervical back: Normal range of motion and neck supple.   Lymphadenopathy:      Cervical: No cervical adenopathy.   Skin:     General: Skin is warm and dry.   Neurological:      Mental Status: She is alert and oriented to person, place, and time. Mental status is at baseline.   Psychiatric:         Mood and Affect: Mood normal.         Behavior: Behavior normal.         Thought Content: Thought content normal.         Judgment: Judgment normal.         Vitals:    08/28/24 1058   BP: 122/72   BP Location: Right arm   Patient Position:  Sitting   BP Method: Small (Manual)   Pulse: 95   Resp: 18   Temp: 97.9 °F (36.6 °C)   TempSrc: Oral   SpO2: 97%   Weight: 82.3 kg (181 lb 7 oz)   Height: 5' (1.524 m)     Body mass index is 35.43 kg/m².    RESULTS: Reviewed labs from last 12 months      Last Lab Results:     Lab Results   Component Value Date    WBC 9.68 04/08/2024    HGB 10.6 (L) 04/08/2024    HCT 35.9 (L) 04/08/2024     04/08/2024     04/08/2024    K 4.4 04/08/2024     (H) 04/08/2024    CO2 21 (L) 04/08/2024    BUN 18 04/08/2024    CREATININE 0.9 04/08/2024    CALCIUM 9.6 04/08/2024    ALBUMIN 3.4 (L) 04/08/2024    AST 15 04/08/2024    ALT 18 04/08/2024    CHOL 211 (H) 10/31/2023    TRIG 165 (H) 10/31/2023    HDL 62 10/31/2023    LDLCALC 116.0 10/31/2023    HGBA1C 5.3 04/08/2024    TSH 3.624 10/31/2023       Assessment & Plan:       1. Prediabetes  -     HEMOGLOBIN A1C; Future; Expected date: 08/28/2024    Will check A1c.  No treatment for now.    2. Class 2 obesity due to excess calories without serious comorbidity with body mass index (BMI) of 35.0 to 35.9 in adult  -     COMPREHENSIVE METABOLIC PANEL; Future; Expected date: 08/28/2024  -     CBC W/ AUTO DIFFERENTIAL; Future; Expected date: 08/28/2024    Patient interested in semaglutide.  Explained about this class of medication.  She may not qualify at this point.  Discussed lifestyle.  Also discussed that she may reassess this option after having her surgeries.    3. Iron deficiency anemia due to chronic blood loss  -     ferrous sulfate (FEROSUL) 325 mg (65 mg iron) Tab tablet; Take 1 tablet (325 mg total) by mouth once daily.  Dispense: 90 tablet; Refill: 0      -     Iron and TIBC; Future; Expected date: 08/28/2024  -     FERRITIN; Future; Expected date: 08/28/2024    Will go on with iron, will check iron studies.    5. Macromastia    Scheduled to have reduction surgery in November.    Patient will establish care with Dr. Traore as I am leaving [facility].  I wish her the  best.         No follow-ups on file.    Addendum: blood work showing severe iron deficiency anemia, cannot be cleared for her mammoplasty at this point. Informed pt, ob-gyn and plastic surgery team.    This note was created by combination of typed  and M-Modal dictation.  Transcription errors may be present.  If there are any questions, please contact me.

## 2024-08-27 NOTE — TELEPHONE ENCOUNTER
No care due was identified.  [facility] Embedded Care Due Messages. Reference number: [id number].   8/27/2024 8:45:31 AM CDT

## 2024-08-28 ENCOUNTER — OFFICE VISIT (OUTPATIENT)
Dept: FAMILY MEDICINE | Facility: CLINIC | Age: 44
End: 2024-08-28
Payer: COMMERCIAL

## 2024-08-28 ENCOUNTER — LAB VISIT (OUTPATIENT)
Dept: LAB | Facility: HOSPITAL | Age: 44
End: 2024-08-28
Attending: INTERNAL MEDICINE
Payer: COMMERCIAL

## 2024-08-28 VITALS
HEART RATE: 95 BPM | TEMPERATURE: 98 F | DIASTOLIC BLOOD PRESSURE: 72 MMHG | OXYGEN SATURATION: 97 % | SYSTOLIC BLOOD PRESSURE: 122 MMHG | RESPIRATION RATE: 18 BRPM | BODY MASS INDEX: 35.62 KG/M2 | WEIGHT: 181.44 LBS | HEIGHT: 60 IN

## 2024-08-28 DIAGNOSIS — E66.09 CLASS 1 OBESITY DUE TO EXCESS CALORIES WITH SERIOUS COMORBIDITY AND BODY MASS INDEX (BMI) OF 32.0 TO 32.9 IN ADULT: ICD-10-CM

## 2024-08-28 DIAGNOSIS — D50.0 IRON DEFICIENCY ANEMIA DUE TO CHRONIC BLOOD LOSS: ICD-10-CM

## 2024-08-28 DIAGNOSIS — E66.09 CLASS 2 OBESITY DUE TO EXCESS CALORIES WITHOUT SERIOUS COMORBIDITY WITH BODY MASS INDEX (BMI) OF 35.0 TO 35.9 IN ADULT: ICD-10-CM

## 2024-08-28 DIAGNOSIS — D50.9 IRON DEFICIENCY ANEMIA, UNSPECIFIED IRON DEFICIENCY ANEMIA TYPE: ICD-10-CM

## 2024-08-28 DIAGNOSIS — R73.03 PREDIABETES: Primary | ICD-10-CM

## 2024-08-28 DIAGNOSIS — R73.03 PREDIABETES: ICD-10-CM

## 2024-08-28 DIAGNOSIS — N62 MACROMASTIA: ICD-10-CM

## 2024-08-28 LAB
ALBUMIN SERPL BCP-MCNC: 3.4 G/DL (ref 3.5–5.2)
ALP SERPL-CCNC: 49 U/L (ref 55–135)
ALT SERPL W/O P-5'-P-CCNC: 13 U/L (ref 10–44)
ANION GAP SERPL CALC-SCNC: 7 MMOL/L (ref 8–16)
AST SERPL-CCNC: 18 U/L (ref 10–40)
BASOPHILS # BLD AUTO: 0.05 K/UL (ref 0–0.2)
BASOPHILS NFR BLD: 0.6 % (ref 0–1.9)
BILIRUB SERPL-MCNC: 0.5 MG/DL (ref 0.1–1)
BUN SERPL-MCNC: 12 MG/DL (ref 6–20)
CALCIUM SERPL-MCNC: 8.9 MG/DL (ref 8.7–10.5)
CHLORIDE SERPL-SCNC: 112 MMOL/L (ref 95–110)
CO2 SERPL-SCNC: 21 MMOL/L (ref 23–29)
CREAT SERPL-MCNC: 0.8 MG/DL (ref 0.5–1.4)
DIFFERENTIAL METHOD BLD: ABNORMAL
EOSINOPHIL # BLD AUTO: 0.1 K/UL (ref 0–0.5)
EOSINOPHIL NFR BLD: 1.3 % (ref 0–8)
ERYTHROCYTE [DISTWIDTH] IN BLOOD BY AUTOMATED COUNT: 18.1 % (ref 11.5–14.5)
EST. GFR  (NO RACE VARIABLE): >60 ML/MIN/1.73 M^2
ESTIMATED AVG GLUCOSE: 85 MG/DL (ref 68–131)
FERRITIN SERPL-MCNC: 11 NG/ML (ref 20–300)
GLUCOSE SERPL-MCNC: 93 MG/DL (ref 70–110)
HBA1C MFR BLD: 4.6 % (ref 4–5.6)
HCT VFR BLD AUTO: 27.5 % (ref 37–48.5)
HGB BLD-MCNC: 7.7 G/DL (ref 12–16)
IMM GRANULOCYTES # BLD AUTO: 0.04 K/UL (ref 0–0.04)
IMM GRANULOCYTES NFR BLD AUTO: 0.4 % (ref 0–0.5)
IRON SERPL-MCNC: 373 UG/DL (ref 30–160)
LYMPHOCYTES # BLD AUTO: 2.2 K/UL (ref 1–4.8)
LYMPHOCYTES NFR BLD: 24.1 % (ref 18–48)
MCH RBC QN AUTO: 25.2 PG (ref 27–31)
MCHC RBC AUTO-ENTMCNC: 28 G/DL (ref 32–36)
MCV RBC AUTO: 90 FL (ref 82–98)
MONOCYTES # BLD AUTO: 0.7 K/UL (ref 0.3–1)
MONOCYTES NFR BLD: 8 % (ref 4–15)
NEUTROPHILS # BLD AUTO: 5.9 K/UL (ref 1.8–7.7)
NEUTROPHILS NFR BLD: 65.6 % (ref 38–73)
NRBC BLD-RTO: 0 /100 WBC
PLATELET # BLD AUTO: 386 K/UL (ref 150–450)
PMV BLD AUTO: 10.5 FL (ref 9.2–12.9)
POTASSIUM SERPL-SCNC: 3.9 MMOL/L (ref 3.5–5.1)
PROT SERPL-MCNC: 6.6 G/DL (ref 6–8.4)
RBC # BLD AUTO: 3.06 M/UL (ref 4–5.4)
SATURATED IRON: 93 % (ref 20–50)
SODIUM SERPL-SCNC: 140 MMOL/L (ref 136–145)
TOTAL IRON BINDING CAPACITY: 403 UG/DL (ref 250–450)
TRANSFERRIN SERPL-MCNC: 272 MG/DL (ref 200–375)
WBC # BLD AUTO: 8.99 K/UL (ref 3.9–12.7)

## 2024-08-28 PROCEDURE — 83036 HEMOGLOBIN GLYCOSYLATED A1C: CPT | Performed by: INTERNAL MEDICINE

## 2024-08-28 PROCEDURE — 83540 ASSAY OF IRON: CPT | Performed by: INTERNAL MEDICINE

## 2024-08-28 PROCEDURE — 85025 COMPLETE CBC W/AUTO DIFF WBC: CPT | Performed by: INTERNAL MEDICINE

## 2024-08-28 PROCEDURE — 36415 COLL VENOUS BLD VENIPUNCTURE: CPT | Mod: PO | Performed by: INTERNAL MEDICINE

## 2024-08-28 PROCEDURE — 80053 COMPREHEN METABOLIC PANEL: CPT | Performed by: INTERNAL MEDICINE

## 2024-08-28 PROCEDURE — 3044F HG A1C LEVEL LT 7.0%: CPT | Mod: CPTII,S$GLB,, | Performed by: INTERNAL MEDICINE

## 2024-08-28 PROCEDURE — 3008F BODY MASS INDEX DOCD: CPT | Mod: CPTII,S$GLB,, | Performed by: INTERNAL MEDICINE

## 2024-08-28 PROCEDURE — 3078F DIAST BP <80 MM HG: CPT | Mod: CPTII,S$GLB,, | Performed by: INTERNAL MEDICINE

## 2024-08-28 PROCEDURE — 82728 ASSAY OF FERRITIN: CPT | Performed by: INTERNAL MEDICINE

## 2024-08-28 PROCEDURE — 99214 OFFICE O/P EST MOD 30 MIN: CPT | Mod: S$GLB,,, | Performed by: INTERNAL MEDICINE

## 2024-08-28 PROCEDURE — 1159F MED LIST DOCD IN RCRD: CPT | Mod: CPTII,S$GLB,, | Performed by: INTERNAL MEDICINE

## 2024-08-28 PROCEDURE — 1160F RVW MEDS BY RX/DR IN RCRD: CPT | Mod: CPTII,S$GLB,, | Performed by: INTERNAL MEDICINE

## 2024-08-28 PROCEDURE — 99999 PR PBB SHADOW E&M-EST. PATIENT-LVL IV: CPT | Mod: PBBFAC,,, | Performed by: INTERNAL MEDICINE

## 2024-08-28 PROCEDURE — 3074F SYST BP LT 130 MM HG: CPT | Mod: CPTII,S$GLB,, | Performed by: INTERNAL MEDICINE

## 2024-08-28 RX ORDER — METHYLPREDNISOLONE 4 MG/1
TABLET ORAL
COMMUNITY
Start: 2024-04-12 | End: 2024-08-28

## 2024-08-28 RX ORDER — FERROUS SULFATE 325(65) MG
325 TABLET ORAL DAILY
Qty: 90 TABLET | Refills: 0 | Status: SHIPPED | OUTPATIENT
Start: 2024-08-28

## 2024-08-28 RX ORDER — NEOMYCIN SULFATE, POLYMYXIN B SULFATE AND DEXAMETHASONE 3.5; 10000; 1 MG/ML; [USP'U]/ML; MG/ML
SUSPENSION/ DROPS OPHTHALMIC
COMMUNITY
Start: 2024-05-06 | End: 2024-08-28

## 2024-08-28 RX ORDER — GENTAMICIN SULFATE 3 MG/ML
SOLUTION/ DROPS OPHTHALMIC
COMMUNITY
Start: 2024-03-24 | End: 2024-08-28

## 2024-08-28 NOTE — PROGRESS NOTES
Health Maintenance Due   Topic Not offered at this Facility    COVID-19 Vaccine (4 - 2023-24 season) Not offered at this Facility    Colorectal Cancer Screening  Scheduled     Mammogram  scheduled

## 2024-08-28 NOTE — TELEPHONE ENCOUNTER
Spoke with pt informed her that her results are not in yet and that she would be notified after the dr has reviewed the labs. Informed pt that surgy are dimitry by dr only and that she will contact her before dimitry

## 2024-08-29 ENCOUNTER — TELEPHONE (OUTPATIENT)
Dept: FAMILY MEDICINE | Facility: CLINIC | Age: 44
End: 2024-08-29
Payer: COMMERCIAL

## 2024-08-29 ENCOUNTER — OFFICE VISIT (OUTPATIENT)
Dept: NEUROLOGY | Facility: CLINIC | Age: 44
End: 2024-08-29
Payer: COMMERCIAL

## 2024-08-29 ENCOUNTER — TELEPHONE (OUTPATIENT)
Dept: SURGERY | Facility: CLINIC | Age: 44
End: 2024-08-29
Payer: COMMERCIAL

## 2024-08-29 VITALS
BODY MASS INDEX: 35.18 KG/M2 | SYSTOLIC BLOOD PRESSURE: 114 MMHG | DIASTOLIC BLOOD PRESSURE: 81 MMHG | HEART RATE: 80 BPM | WEIGHT: 180.13 LBS

## 2024-08-29 DIAGNOSIS — G25.81 RESTLESS LEG SYNDROME DUE TO IRON DEFICIENCY ANEMIA: Primary | ICD-10-CM

## 2024-08-29 DIAGNOSIS — M54.16 LUMBAR RADICULOPATHY, RIGHT: ICD-10-CM

## 2024-08-29 DIAGNOSIS — D50.9 RESTLESS LEG SYNDROME DUE TO IRON DEFICIENCY ANEMIA: Primary | ICD-10-CM

## 2024-08-29 PROCEDURE — 3008F BODY MASS INDEX DOCD: CPT | Mod: CPTII,S$GLB,, | Performed by: STUDENT IN AN ORGANIZED HEALTH CARE EDUCATION/TRAINING PROGRAM

## 2024-08-29 PROCEDURE — 3044F HG A1C LEVEL LT 7.0%: CPT | Mod: CPTII,S$GLB,, | Performed by: STUDENT IN AN ORGANIZED HEALTH CARE EDUCATION/TRAINING PROGRAM

## 2024-08-29 PROCEDURE — 3074F SYST BP LT 130 MM HG: CPT | Mod: CPTII,S$GLB,, | Performed by: STUDENT IN AN ORGANIZED HEALTH CARE EDUCATION/TRAINING PROGRAM

## 2024-08-29 PROCEDURE — 99203 OFFICE O/P NEW LOW 30 MIN: CPT | Mod: S$GLB,,, | Performed by: STUDENT IN AN ORGANIZED HEALTH CARE EDUCATION/TRAINING PROGRAM

## 2024-08-29 PROCEDURE — 1159F MED LIST DOCD IN RCRD: CPT | Mod: CPTII,S$GLB,, | Performed by: STUDENT IN AN ORGANIZED HEALTH CARE EDUCATION/TRAINING PROGRAM

## 2024-08-29 PROCEDURE — 99999 PR PBB SHADOW E&M-EST. PATIENT-LVL III: CPT | Mod: PBBFAC,,, | Performed by: STUDENT IN AN ORGANIZED HEALTH CARE EDUCATION/TRAINING PROGRAM

## 2024-08-29 PROCEDURE — 3079F DIAST BP 80-89 MM HG: CPT | Mod: CPTII,S$GLB,, | Performed by: STUDENT IN AN ORGANIZED HEALTH CARE EDUCATION/TRAINING PROGRAM

## 2024-08-29 NOTE — TELEPHONE ENCOUNTER
----- Message from Magdalene Kenny RN sent at 8/29/2024  1:06 PM CDT -----  Agus Becker,       Your patient indicated above requires Pre-Op Clearance/ Risk Stratification for a Bilateral mammoplasty reduction with Dr. Lugo on 11/12/2024 (General anesthesia, 225 minutes).  Anesthesia review is in progress.     If a chart review is appropriate for clearance, please indicate in a note in the EMR.       If not, please advise timely, so that your clinic may schedule an appointment.        Thank you,   Magdalene Kenny RN   [facility] [place]

## 2024-08-29 NOTE — PROGRESS NOTES
[facility]  Clinic Note    Date of Service: 8/29/2024  Patient seen at the request of: Dirk Cummings DPM    Reason for Consultation  Discomfort in the right foot    Assessment:  Umer Rodriguez is a 44 y.o. female who presents with discomfort in the right foot that is responsive to moving the foot.    Patient presents with an 8 month history of discomfort that she feels maximally at the right lateral ankle.  This feeling occurs most often while she is in bed going to sleep.  Resolution of the feeling with movement of the leg.  On exam, patient has no focal weakness or focal sensory change.  Reflexes are normal.  No evidence of a neuropathy.    Patient's presentation is consistent with restless leg syndrome.  Most likely cause is low iron.  There is a history of significant menorrhagia for the last 3 years.    Patient reports that she can currently manage off of medications and would prefer to continue as such for now.    Serum ferritin drawn yesterday was 11.    Plan:    - continue iron supplemention per primary and gynecologist  - continue follow up with primary and gynecologist for workup and treatment of menorrhagia (she is planning on hysterectomy in November)      - RTC in 5 months      Signed:    Pedro Sawant MD  Neurology/Epilepsy  08/29/2024 11:34 AM      HPI:  Umer Rodriguez is a 44 y.o. female with   Past Medical History:   Diagnosis Date    Allergy     Fibroids     ARNOL (iron deficiency anemia) 1/21/2014     Patient reports discomfort in the right foot that started at the beginning of 2024.  It is just in the lateral part of the ankle.  She denies shooting or tingling pain.  It feels like a wave of discomfort.  No progression of the discomfort.  She feels it mainly at night in bed but sometimes while driving.  She can sometimes feel a vibration in the foot.  No weakness in the foot.    Patient finds that it goes away when she flutters her feet or adjusts herself in bed.  It  then goes away all night.  Does not occur every night.      No transient vision changes.  No other weakness or numbness in the extremities.      Patient sees podiatry and has a history of hammer toe and bunion surgery in  on the right foot.      Patient has a history of iron deficiency.  Iron was low on 2024 and she is currently supplementing iron.  Patient reports that her cycle lasts weeks (currently at 3 weeks).  This started about 3 years ago.    She is planning on a hysterectomy in 2024.      This is the extent of the patient's complaints at this time.    TSH   Date Value Ref Range Status   10/31/2023 3.624 0.400 - 4.000 uIU/mL Final   10/30/2018 1.531 0.400 - 4.000 uIU/mL Final         Review of Systems:  ROS negative unless noted in HPI    Past Surgical History:  Past Surgical History:   Procedure Laterality Date     SECTION  2013    CYSTOSCOPY N/A 2019    Procedure: CYSTOSCOPY;  Surgeon: Pollo Park MD;  Location: [facility] OR [address] [place] [address];  Service: Urology;  Laterality: N/A;  30 min/2nd floor OR    FOOT SURGERY Right     INJECTION OF BOTULINUM TOXIN TYPE A N/A 2019    Procedure: INJECTION, BOTULINUM TOXIN, TYPE A;  Surgeon: Pollo Park MD;  Location: [facility] OR [address] [place] [address];  Service: Urology;  Laterality: N/A;       Family History:  Family History   Problem Relation Name Age of Onset    Hypertension Mother      Breast cancer Neg Hx      Colon cancer Neg Hx      Ovarian cancer Neg Hx      Hyperlipidemia Neg Hx      Heart disease Neg Hx      Diabetes Neg Hx      Cancer Neg Hx      Arthritis Neg Hx         Social History:  Social History     Tobacco Use    Smoking status: Never    Smokeless tobacco: Never   Substance Use Topics    Alcohol use: Yes     Comment: occasional    Drug use: No       Allergies:  Oxycodone    Outpatient Medications:  Prior to Admission medications    Medication Sig Start Date End Date Taking? Authorizing Provider   cetirizine (ZYRTEC) 10 MG tablet Take 1  tablet (10 mg total) by mouth once daily. 4/8/24   Svitlana Becker MD   drospirenone-ethinyl estradioL (PHU) 3-0.02 mg per tablet Take 1 tablet by mouth once daily. 8/23/24   Nanette Timmons MD   ergocalciferol (ERGOCALCIFEROL) 50,000 unit Cap Take 1 capsule (50,000 Units total) by mouth every 7 days. 8/27/24   Svitlana Becker MD   ferrous sulfate (FEROSUL) 325 mg (65 mg iron) Tab tablet Take 1 tablet (325 mg total) by mouth once daily. 8/28/24   Svitlana Becker MD   fluticasone propionate (FLONASE) 50 mcg/actuation nasal spray SHAKE LIQUID AND USE 1 SPRAY(50 MCG) IN EACH NOSTRIL DAILY 5/5/24   Svitlana Becker MD   ibuprofen (ADVIL,MOTRIN) 800 MG tablet Take 800 mg by mouth 2 (two) times daily as needed. 9/22/22   Provider, Historical       Physical exam:    Vitals: /81 (BP Location: Left arm, Patient Position: Sitting, BP Method: Medium (Automatic))   Pulse 80   Wt 81.7 kg (180 lb 1.9 oz)   LMP 08/13/2024 (Exact Date)   BMI 35.18 kg/m²     General:   Sitting in chair, in no distress, well-nourished, well-developed, appears stated age.  Head/Neck:   Normocephalic,atraumatic  Pulm:  Non-labored breathing     Mental Status: Alert and oriented to person, time, place, situation. Speech spontaneous and fluent without paraphasias; no dysarthria  CN:  II: visual fields full  III, IV, VI: EOM intact without nystagmus or diplopia.   V: Sensation to light touch full and symmetric in V1-3. [place] contraction full bilaterally.   VII: Facial movement full and symmetric.   VIII: Hearing grossly normal to conversation.  IX, X: Palate midline with symmetric elevation.    XI: SCM and trapezius: 5/5 bilaterally.   XII: Tongue midline without fasciculations.  Motor: Normal bulk and tone throughout all four extremities.   RUE: D: 5/5; B: 5/5; T:  5/5; WF:5/5; WE:  5/5; IO: 5/5   LUE: D: 5/5; B: 5/5; T:  5/5; WF: 5/5; WE:  5/5; IO: 5/5   RLE: HF: 5/5, KE: 5/5, KF: 5/5, DF: 5/5, PF: 5/5  LLE: HF: 5/5, KE:  5/5, KF: 5/5, DF: 5/5, PF: 5/5  No tremors   Sensory: Intact and symmetric to light touch throughout.  Reflexes: RUE: Triceps 2+, biceps 2+, brachioradialis 2+  LUE: Triceps 2+, biceps 2+ brachioradialis 2+  RLE: Knee 2+, ankle 2+  LLE: Knee 2+, ankle 2+  Coordination:  Intact and symmetric to finger-to-nose and heel-to-shin.  Gait:  Intact to casual gait.    Imaging:  All pertinent imaging was personally reviewed.

## 2024-08-29 NOTE — TELEPHONE ENCOUNTER
Left message on voicemail informing patient they will need to schedule a surgery clearance appointment with their PCP.

## 2024-08-29 NOTE — TELEPHONE ENCOUNTER
Magdalene Kenny RN/ [facility] [place] to find out if patient can be cleared through chart for surgery clearance or if an office visit is needed.  Please advise.

## 2024-08-30 ENCOUNTER — PATIENT MESSAGE (OUTPATIENT)
Dept: PLASTIC SURGERY | Facility: CLINIC | Age: 44
End: 2024-08-30
Payer: COMMERCIAL

## 2024-08-30 ENCOUNTER — TELEPHONE (OUTPATIENT)
Dept: PREADMISSION TESTING | Facility: HOSPITAL | Age: 44
End: 2024-08-30
Payer: COMMERCIAL

## 2024-08-30 NOTE — TELEPHONE ENCOUNTER
Phone call to patient regarding PCP clearance for surgery 11/12/2024.  After using 2 patient identifiers, explained to pt her PCP is leaving [facility], offered an appointment with [facility] for Medical clearance and anesthesia evaluation.  Message sent to schedulers.

## 2024-09-05 ENCOUNTER — TELEPHONE (OUTPATIENT)
Dept: FAMILY MEDICINE | Facility: CLINIC | Age: 44
End: 2024-09-05
Payer: COMMERCIAL

## 2024-09-05 ENCOUNTER — PATIENT MESSAGE (OUTPATIENT)
Dept: FAMILY MEDICINE | Facility: CLINIC | Age: 44
End: 2024-09-05
Payer: COMMERCIAL

## 2024-09-05 NOTE — TELEPHONE ENCOUNTER
----- Message from Svitlana Becker MD sent at 9/5/2024  6:58 AM CDT -----  Regarding: follow-up  Please see my previous messages I sent both to pt and team. Pt needed to contact us for f-up as not cleared for upcoming surgeries.    I don't see any f-up messages nor that pt answered me/called. Can you please see what is going on?    Thanks!

## 2024-09-10 ENCOUNTER — PATIENT MESSAGE (OUTPATIENT)
Dept: OBSTETRICS AND GYNECOLOGY | Facility: CLINIC | Age: 44
End: 2024-09-10
Payer: COMMERCIAL

## 2024-09-16 ENCOUNTER — HOSPITAL ENCOUNTER (OUTPATIENT)
Dept: RADIOLOGY | Facility: HOSPITAL | Age: 44
Discharge: HOME | End: 2024-09-16
Attending: OBSTETRICS & GYNECOLOGY
Payer: COMMERCIAL

## 2024-09-16 ENCOUNTER — TELEPHONE (OUTPATIENT)
Dept: FAMILY MEDICINE | Facility: CLINIC | Age: 44
End: 2024-09-16
Payer: COMMERCIAL

## 2024-09-16 DIAGNOSIS — N93.9 ABNORMAL UTERINE BLEEDING (AUB): ICD-10-CM

## 2024-09-16 PROCEDURE — 76830 TRANSVAGINAL US NON-OB: CPT | Mod: 26,,, | Performed by: RADIOLOGY

## 2024-09-16 PROCEDURE — 76856 US EXAM PELVIC COMPLETE: CPT | Mod: 26,,, | Performed by: RADIOLOGY

## 2024-09-16 PROCEDURE — 76830 TRANSVAGINAL US NON-OB: CPT | Mod: TC

## 2024-09-24 ENCOUNTER — TELEPHONE (OUTPATIENT)
Dept: OBSTETRICS AND GYNECOLOGY | Facility: CLINIC | Age: 44
End: 2024-09-24
Payer: COMMERCIAL

## 2024-09-24 NOTE — TELEPHONE ENCOUNTER
----- Message from Maral Wilson sent at 9/24/2024 10:07 AM CDT -----  Type:  Patient Returning Call    Who Called: pt     Who Left Message for Patient: doesn't know    Does the patient know what this is regarding?:no    Would the patient rather a call back or a response via [facility]? call    Best Call Back Number:[phone] ([place])       Additional Information:   Stable.

## 2024-10-01 ENCOUNTER — PATIENT MESSAGE (OUTPATIENT)
Dept: OBSTETRICS AND GYNECOLOGY | Facility: CLINIC | Age: 44
End: 2024-10-01
Payer: COMMERCIAL

## 2024-10-01 ENCOUNTER — TELEPHONE (OUTPATIENT)
Dept: OBSTETRICS AND GYNECOLOGY | Facility: CLINIC | Age: 44
End: 2024-10-01
Payer: COMMERCIAL

## 2024-10-14 ENCOUNTER — TELEPHONE (OUTPATIENT)
Dept: ENDOSCOPY | Facility: HOSPITAL | Age: 44
End: 2024-10-14
Payer: COMMERCIAL

## 2024-10-14 ENCOUNTER — ANESTHESIA EVENT (OUTPATIENT)
Dept: SURGERY | Facility: HOSPITAL | Age: 44
End: 2024-10-14
Payer: COMMERCIAL

## 2024-10-14 NOTE — TELEPHONE ENCOUNTER
Left voicemail and sent portal message for patient to call Endoscopy Scheduling to review instructions and confirm appointment for Colonoscopy on 10/21/24.

## 2024-10-17 ENCOUNTER — TELEPHONE (OUTPATIENT)
Dept: ENDOSCOPY | Facility: HOSPITAL | Age: 44
End: 2024-10-17
Payer: COMMERCIAL

## 2024-10-17 NOTE — TELEPHONE ENCOUNTER
Spoke to patient for pre-call to confirm scheduled Colonoscopy and patient verbalized understanding of the following:       Date & arrival time of procedure(s) verified 10/21/24, 12:15 PM.  Location of procedure(s) [address]  verified.  NPO status reinforced. Ok to continue clear liquids until 11:15 AM.   Patient denies use of blood thinners, GLP-1 medications, and weight loss medications.  Patient confirmed receipt of prep instructions and Rx prep.  Instructions provided to patient via [facility].  Patient confirmed ride home after procedure if procedure requires anesthesia.   Pre-call screening questionnaire reviewed and completed with patient.   Appointment details are tentative, including check-in time.  If the patient begins taking any blood thinning medications, injectable weight loss/diabetes medications (other than insulin), or Adipex (phentermine) patient was instructed to contact the endoscopy scheduling department as soon as possible.  Patient was advised to call the endoscopy scheduling department if any questions or concerns arise.     [facility] Endoscopy Scheduling Department

## 2024-10-18 ENCOUNTER — TELEPHONE (OUTPATIENT)
Dept: ENDOSCOPY | Facility: HOSPITAL | Age: 44
End: 2024-10-18
Payer: COMMERCIAL

## 2024-10-18 ENCOUNTER — PATIENT MESSAGE (OUTPATIENT)
Dept: OBSTETRICS AND GYNECOLOGY | Facility: CLINIC | Age: 44
End: 2024-10-18
Payer: COMMERCIAL

## 2024-10-18 NOTE — TELEPHONE ENCOUNTER
Referral for procedure from PAT appointment    Spoke to patient to reschedule Colonoscopy       Physician to perform procedure(s) Dr. LINWOOD Seo   Date of Procedure (s) 10/23/24  Arrival Time 11:30 AM  Time of Procedure(s) 12:30 PM   Location of Procedure(s) [facility]   Type of Rx Prep patient already received from pharmacy: Suprep  Instructions provided to patient via [facility]    Patient denies use of blood thinners, GLP-1 medications, and weight loss medications.  The following information was discussed with patient, and patient verbalized understanding:  Screening questionnaire reviewed with patient and complete. If procedure requires anesthesia, a responsible adult needs to be present to accompany the patient home. Appointment details are tentative, especially check-in time. Patient will receive a pre-op call 7 days prior to appointment to confirm check-in time for procedure. If applicable the patient should contact their pharmacy to verify Rx for procedure prep is ready for pick-up. Patient was instructed to call the scheduling department at [phone] if pharmacy states no Rx is available. Patient was also advised to call the endoscopy scheduling department if any questions or concerns arise.       Endoscopy Scheduling Department

## 2024-10-22 ENCOUNTER — TELEPHONE (OUTPATIENT)
Dept: ENDOSCOPY | Facility: HOSPITAL | Age: 44
End: 2024-10-22
Payer: COMMERCIAL

## 2024-10-22 NOTE — H&P
Short Stay Endoscopy History and Physical    PCP - Nidia Traore MD    Procedure - Colonoscopy  ASA - per anesthesia  Mallampati - per anesthesia  History of Anesthesia problems - no  Family history Anesthesia problems - no   Plan of anesthesia - General    HPI:  This is a 44 y.o. female here for evaluation of : asymptomatic screening exam      ROS:  Constitutional: No fevers, chills, No weight loss  CV: No chest pain  Pulm: No cough, No shortness of breath  GI: see HPI  Derm: No rash    Medical History:  has a past medical history of Allergy, Fibroids, and ARNOL (iron deficiency anemia) (2014).    Surgical History:  has a past surgical history that includes  section (, ); Foot surgery (Right); Cystoscopy (N/A, 2019); and Injection of botulinum toxin type A (N/A, 2019).    Family History: family history includes Hypertension in her mother.. Otherwise no colon cancer, inflammatory bowel disease, or GI malignancies.    Social History:  reports that she has never smoked. She has never used smokeless tobacco. She reports current alcohol use. She reports that she does not use drugs.    Review of patient's allergies indicates:   Allergen Reactions    Oxycodone Anaphylaxis       Medications:   No medications prior to admission.         Physical Exam:    Vital Signs: There were no vitals filed for this visit.    Gen: NAD, lying comfortably  HENT: NCAT, oropharynx clear  Eyes: anicteric sclerae, EOMI grossly  Neck: supple, no visible masses/goiter  Cardiac: RRR  Lungs: non-labored breathing  Abd: soft, NT/ND, normoactive BS  Ext: no LE edema, warm, well perfused  Skin: skin intact on exposed body parts, no visible rashes, lesions  Neuro: A&Ox4, neuro exam grossly intact, moves all extremities  Psych: appropriate mood, affect        Labs:  Lab Results   Component Value Date    WBC 8.99 2024    HGB 7.7 (L) 2024    HCT 27.5 (L) 2024     2024    CHOL 211 (H)  10/31/2023    TRIG 165 (H) 10/31/2023    HDL 62 10/31/2023    ALT 13 08/28/2024    AST 18 08/28/2024     08/28/2024    K 3.9 08/28/2024     (H) 08/28/2024    CREATININE 0.8 08/28/2024    BUN 12 08/28/2024    CO2 21 (L) 08/28/2024    TSH 3.624 10/31/2023    INR 1.0 10/28/2011    HGBA1C 4.6 08/28/2024       Plan:  Colonoscopy for colon cancer screening.    I have explained the risks and benefits of endoscopy procedures to the patient including but not limited to bleeding, perforation, infection, and death.  The patient was asked if they understand and allowed to ask any further questions to their satisfaction.    Keila Seo MD

## 2024-10-22 NOTE — TELEPHONE ENCOUNTER
Spoke to pt for pre-call to confirm scheduled Colonoscopy and patient verbalized understanding of the following:       Date of Procedure (s)  verified 10/23/24  Arrival Time 11:30 AM verified.  Location of Procedure(s) [address]  verified.  NPO status reinforced. Ok to continue clear liquids up until 2 hours prior to the Endoscopy procedure.   Pt confirmed receipt of prep instructions and Rx prep (if applicable).  Instructions provided to patient via [facility]  Pt confirmed ride home after procedure if procedure requires anesthesia.   Pre-call screening questionnaire reviewed and completed with patient.   Appointment details are tentative, including check-in time.  If the patient begins taking any blood thinning medications, injectable weight loss/diabetes medications (other than insulin), or Adipex (phentermine) patient was instructed to contact the endoscopy scheduling department as soon as possible.  Patient was advised to call the endoscopy scheduling department if any questions or concerns arise.       [facility] Endoscopy Scheduling Department

## 2024-10-23 ENCOUNTER — ANESTHESIA EVENT (OUTPATIENT)
Dept: ENDOSCOPY | Facility: HOSPITAL | Age: 44
End: 2024-10-23
Payer: COMMERCIAL

## 2024-10-23 ENCOUNTER — HOSPITAL ENCOUNTER (OUTPATIENT)
Facility: HOSPITAL | Age: 44
Discharge: HOME | End: 2024-10-23
Attending: INTERNAL MEDICINE | Admitting: INTERNAL MEDICINE
Payer: COMMERCIAL

## 2024-10-23 ENCOUNTER — ANESTHESIA (OUTPATIENT)
Dept: ENDOSCOPY | Facility: HOSPITAL | Age: 44
End: 2024-10-23
Payer: COMMERCIAL

## 2024-10-23 VITALS
HEART RATE: 69 BPM | OXYGEN SATURATION: 100 % | TEMPERATURE: 98 F | RESPIRATION RATE: 20 BRPM | SYSTOLIC BLOOD PRESSURE: 97 MMHG | DIASTOLIC BLOOD PRESSURE: 63 MMHG

## 2024-10-23 DIAGNOSIS — Z12.11 COLON CANCER SCREENING: ICD-10-CM

## 2024-10-23 LAB
B-HCG UR QL: NEGATIVE
CTP QC/QA: YES

## 2024-10-23 PROCEDURE — 37000009 HC ANESTHESIA EA ADD 15 MINS: Performed by: INTERNAL MEDICINE

## 2024-10-23 PROCEDURE — 37000008 HC ANESTHESIA 1ST 15 MINUTES: Performed by: INTERNAL MEDICINE

## 2024-10-23 PROCEDURE — G0121 COLON CA SCRN NOT HI RSK IND: HCPCS | Performed by: INTERNAL MEDICINE

## 2024-10-23 PROCEDURE — 25000003 PHARM REV CODE 250: Performed by: NURSE ANESTHETIST, CERTIFIED REGISTERED

## 2024-10-23 PROCEDURE — 63600175 PHARM REV CODE 636 W HCPCS: Performed by: NURSE ANESTHETIST, CERTIFIED REGISTERED

## 2024-10-23 PROCEDURE — 81025 URINE PREGNANCY TEST: CPT | Performed by: INTERNAL MEDICINE

## 2024-10-23 PROCEDURE — G0121 COLON CA SCRN NOT HI RSK IND: HCPCS | Mod: ,,, | Performed by: INTERNAL MEDICINE

## 2024-10-23 RX ORDER — LIDOCAINE HYDROCHLORIDE 20 MG/ML
INJECTION INTRAVENOUS
Status: DISCONTINUED | OUTPATIENT
Start: 2024-10-23 | End: 2024-10-23

## 2024-10-23 RX ORDER — PROPOFOL 10 MG/ML
VIAL (ML) INTRAVENOUS
Status: DISCONTINUED | OUTPATIENT
Start: 2024-10-23 | End: 2024-10-23

## 2024-10-23 RX ADMIN — LIDOCAINE HYDROCHLORIDE 100 MG: 20 INJECTION, SOLUTION INTRAVENOUS at 01:10

## 2024-10-23 RX ADMIN — PROPOFOL 80 MG: 10 INJECTION, EMULSION INTRAVENOUS at 01:10

## 2024-10-23 RX ADMIN — PROPOFOL 60 MG: 10 INJECTION, EMULSION INTRAVENOUS at 01:10

## 2024-10-23 RX ADMIN — PROPOFOL 40 MG: 10 INJECTION, EMULSION INTRAVENOUS at 01:10

## 2024-10-23 RX ADMIN — SODIUM CHLORIDE: 0.9 INJECTION, SOLUTION INTRAVENOUS at 01:10

## 2024-10-23 NOTE — TRANSFER OF CARE
Anesthesia Transfer of Care Note    Patient: Umer Rodriguez    Procedure(s) Performed: Procedure(s) (LRB):  COLONOSCOPY (N/A)    Patient location: GI    Anesthesia Type: general    Transport from OR: Transported from OR on room air with adequate spontaneous ventilation    Post pain: adequate analgesia    Post assessment: no apparent anesthetic complications and tolerated procedure well    Post vital signs: stable    Level of consciousness: sedated    Nausea/Vomiting: no nausea/vomiting    Complications: none    Transfer of care protocol was followed      Last vitals: Visit Vitals  /68 (BP Location: Left arm, Patient Position: Lying)   Pulse 97   Temp 36.5 °C (97.7 °F) (Skin)   Resp 16   SpO2 100%   Breastfeeding No

## 2024-10-23 NOTE — PROVATION PATIENT INSTRUCTIONS
Discharge Summary/Instructions after an Endoscopic Procedure  Patient Name: Umer Rodriguez  Patient MRN: [id number]  Patient YOB: 1980 Wednesday, October 23, 2024  Keila Seo MD  Dear patient,  As a result of recent federal legislation (The Federal Cures Act), you may   receive lab or pathology results from your procedure in your [facility]   account before your physician is able to contact you. Your physician or   their representative will relay the results to you with their   recommendations at their soonest availability.  Thank you,  RESTRICTIONS:  During your procedure today, you received medications for sedation.  These   medications may affect your judgment, balance and coordination.  Therefore,   for 24 hours, you have the following restrictions:   - DO NOT drive a car, operate machinery, make legal/financial decisions,   sign important papers or drink alcohol.    ACTIVITY:  Today: no heavy lifting, straining or running due to procedural   sedation/anesthesia.  The following day: return to full activity including work.  DIET:  Eat and drink normally unless instructed otherwise.     TREATMENT FOR COMMON SIDE EFFECTS:  - Mild abdominal pain, nausea, belching, bloating or excessive gas:  rest,   eat lightly and use a heating pad.  - Sore Throat: treat with throat lozenges and/or gargle with warm salt   water.  - Because air was used during the procedure, expelling large amounts of air   from your rectum or belching is normal.  - If a bowel prep was taken, you may not have a bowel movement for 1-3 days.    This is normal.  SYMPTOMS TO WATCH FOR AND REPORT TO YOUR PHYSICIAN:  1. Abdominal pain or bloating, other than gas cramps.  2. Chest pain.  3. Back pain.  4. Signs of infection such as: chills or fever occurring within 24 hours   after the procedure.  5. Rectal bleeding, which would show as bright red, maroon, or black stools.   (A tablespoon of blood from the rectum is not serious, especially  if   hemorrhoids are present.)  6. Vomiting.  7. Weakness or dizziness.  GO DIRECTLY TO THE NEAREST EMERGENCY ROOM IF YOU HAVE ANY OF THE FOLLOWING:      Difficulty breathing              Chills and/or fever over 101 F   Persistent vomiting and/or vomiting blood   Severe abdominal pain   Severe chest pain   Black, tarry stools   Bleeding- more than one tablespoon   Any other symptom or condition that you feel may need urgent attention  Your doctor recommends these additional instructions:  If any biopsies were taken, your doctors clinic will contact you in 1 to 2   weeks with any results.  - Discharge patient to home.   - Resume previous diet.   - Continue present medications.   - Repeat colonoscopy in 10 years for screening purposes.   - Patient has a contact number available for emergencies.  The signs and   symptoms of potential delayed complications were discussed with the   patient.  Return to normal activities tomorrow.  Written discharge   instructions were provided to the patient.  For questions, problems or results please call your physician - Keila Seo MD at Work:  [phone].  [facility] can be reached at [phone]     IF A COMPLICATION OR EMERGENCY SITUATION ARISES AND YOU ARE UNABLE TO REACH   YOUR PHYSICIAN - GO DIRECTLY TO THE EMERGENCY ROOM.  Keila Seo MD  10/23/2024 2:00:36 PM  This report has been verified and signed electronically.  Dear patient,  As a result of recent federal legislation (The Federal Cures Act), you may   receive lab or pathology results from your procedure in your [facility]   account before your physician is able to contact you. Your physician or   their representative will relay the results to you with their   recommendations at their soonest availability.  Thank you,  PROVATION   No - the patient is unable to be screened due to medical condition

## 2024-10-23 NOTE — PLAN OF CARE
Procedure and recovery complete. Pt awake and alert. MD at bedside, procedure findings and suggestions discussed. Discharge instructions given, pt verbalized understanding of instruction. Gait steady, able to ambulate without assistance. Pt walked out accompanied by mom.

## 2024-10-23 NOTE — ANESTHESIA POSTPROCEDURE EVALUATION
Anesthesia Post Evaluation    Patient: Umer Rodriguez    Procedure(s) Performed: Procedure(s) (LRB):  COLONOSCOPY (N/A)    Final Anesthesia Type: general      Patient location during evaluation: GI PACU  Patient participation: Yes- Able to Participate  Level of consciousness: awake and alert  Post-procedure vital signs: reviewed and stable  Airway patency: patent    PONV status at discharge: No PONV  Anesthetic complications: no      Cardiovascular status: blood pressure returned to baseline and hemodynamically stable  Respiratory status: unassisted, spontaneous ventilation and room air  Hydration status: euvolemic  Follow-up not needed.              Vitals Value Taken Time   BP 97/63 10/23/24 1428   Temp 36.5 °C (97.7 °F) 10/23/24 1358   Pulse 69 10/23/24 1428   Resp 20 10/23/24 1428   SpO2 100 % 10/23/24 1428         No case tracking events are documented in the log.      Pain/Sissy Score: Sissy Score: 10 (10/23/2024  2:28 PM)

## 2024-10-23 NOTE — ANESTHESIA PREPROCEDURE EVALUATION
10/23/2024  Umer Rodriguez is a 44 y.o., female.      Pre-op Assessment    I have reviewed the Patient Summary Reports.     I have reviewed the Nursing Notes. I have reviewed the NPO Status.   I have reviewed the Medications.     Review of Systems  Anesthesia Hx:  No problems with previous Anesthesia             Denies Family Hx of Anesthesia complications.    Denies Personal Hx of Anesthesia complications.                    Social:  Social Alcohol Use, Non-Smoker       Hematology/Oncology:  Hematology Normal                                     EENT/Dental:  EENT/Dental Normal           Cardiovascular:  Cardiovascular Normal                                              Renal/:  Renal/ Normal                 Hepatic/GI:  Hepatic/GI Normal                    Musculoskeletal:  Musculoskeletal Normal                Neurological:           Spina bifida                            Endocrine:  Endocrine Normal            Psych:  Psychiatric Normal                    Physical Exam  General: Oriented, Alert and Cooperative    Airway:  Mallampati: I   Mouth Opening: Normal  TM Distance: Normal  Tongue: Normal  Neck ROM: Normal ROM    Dental:  Intact        Anesthesia Plan  Type of Anesthesia, risks & benefits discussed:    Anesthesia Type: Gen Natural Airway  Intra-op Monitoring Plan: Standard ASA Monitors  Induction:  IV  Informed Consent: Informed consent signed with the Patient and all parties understand the risks and agree with anesthesia plan.  All questions answered. Patient consented to blood products? No  ASA Score: 1    Ready For Surgery From Anesthesia Perspective.     .

## 2024-10-24 ENCOUNTER — OFFICE VISIT (OUTPATIENT)
Dept: FAMILY MEDICINE | Facility: CLINIC | Age: 44
End: 2024-10-24
Payer: COMMERCIAL

## 2024-10-24 ENCOUNTER — HOSPITAL ENCOUNTER (OUTPATIENT)
Dept: RADIOLOGY | Facility: HOSPITAL | Age: 44
Discharge: HOME | End: 2024-10-24
Attending: OBSTETRICS & GYNECOLOGY
Payer: COMMERCIAL

## 2024-10-24 VITALS
HEIGHT: 60 IN | BODY MASS INDEX: 34.97 KG/M2 | SYSTOLIC BLOOD PRESSURE: 136 MMHG | TEMPERATURE: 98 F | RESPIRATION RATE: 18 BRPM | WEIGHT: 178.13 LBS | OXYGEN SATURATION: 99 % | HEART RATE: 71 BPM | DIASTOLIC BLOOD PRESSURE: 74 MMHG

## 2024-10-24 VITALS — BODY MASS INDEX: 35.34 KG/M2 | WEIGHT: 180 LBS | HEIGHT: 60 IN

## 2024-10-24 DIAGNOSIS — Q05.7 SPINA BIFIDA OF LUMBOSACRAL REGION WITHOUT HYDROCEPHALUS: ICD-10-CM

## 2024-10-24 DIAGNOSIS — N62 MACROMASTIA: ICD-10-CM

## 2024-10-24 DIAGNOSIS — D50.0 IRON DEFICIENCY ANEMIA DUE TO CHRONIC BLOOD LOSS: Primary | ICD-10-CM

## 2024-10-24 DIAGNOSIS — R79.9 ABNORMAL FINDING OF BLOOD CHEMISTRY, UNSPECIFIED: ICD-10-CM

## 2024-10-24 DIAGNOSIS — Z12.31 BREAST CANCER SCREENING BY MAMMOGRAM: ICD-10-CM

## 2024-10-24 DIAGNOSIS — E55.9 VITAMIN D DEFICIENCY: ICD-10-CM

## 2024-10-24 DIAGNOSIS — R73.03 PREDIABETES: ICD-10-CM

## 2024-10-24 DIAGNOSIS — J30.1 SEASONAL ALLERGIC RHINITIS DUE TO POLLEN: ICD-10-CM

## 2024-10-24 DIAGNOSIS — N92.0 MENORRHAGIA WITH REGULAR CYCLE: ICD-10-CM

## 2024-10-24 PROCEDURE — 3075F SYST BP GE 130 - 139MM HG: CPT | Mod: CPTII,S$GLB,, | Performed by: INTERNAL MEDICINE

## 2024-10-24 PROCEDURE — 99214 OFFICE O/P EST MOD 30 MIN: CPT | Mod: S$GLB,,, | Performed by: INTERNAL MEDICINE

## 2024-10-24 PROCEDURE — 1160F RVW MEDS BY RX/DR IN RCRD: CPT | Mod: CPTII,S$GLB,, | Performed by: INTERNAL MEDICINE

## 2024-10-24 PROCEDURE — 3008F BODY MASS INDEX DOCD: CPT | Mod: CPTII,S$GLB,, | Performed by: INTERNAL MEDICINE

## 2024-10-24 PROCEDURE — 77067 SCR MAMMO BI INCL CAD: CPT | Mod: TC

## 2024-10-24 PROCEDURE — 99999 PR PBB SHADOW E&M-EST. PATIENT-LVL IV: CPT | Mod: PBBFAC,,, | Performed by: INTERNAL MEDICINE

## 2024-10-24 PROCEDURE — 1159F MED LIST DOCD IN RCRD: CPT | Mod: CPTII,S$GLB,, | Performed by: INTERNAL MEDICINE

## 2024-10-24 PROCEDURE — 3078F DIAST BP <80 MM HG: CPT | Mod: CPTII,S$GLB,, | Performed by: INTERNAL MEDICINE

## 2024-10-24 PROCEDURE — 3044F HG A1C LEVEL LT 7.0%: CPT | Mod: CPTII,S$GLB,, | Performed by: INTERNAL MEDICINE

## 2024-10-24 RX ORDER — CETIRIZINE HYDROCHLORIDE 10 MG/1
10 TABLET ORAL DAILY
Qty: 90 TABLET | Refills: 3 | Status: SHIPPED | OUTPATIENT
Start: 2024-10-24 | End: 2025-10-24

## 2024-10-24 NOTE — PROGRESS NOTES
Health Maintenance Due   Topic     Influenza Vaccine (1) Pt decline    COVID-19 Vaccine (4 - 2024-25 season) Pt decline    Mammogram  Scheduled for today

## 2024-10-24 NOTE — PROGRESS NOTES
Chief Complaint: Establish Care      Umer Rodriguez  is a 44 y.o. year old patient who presents today for follow up     Patient's father passed away from rectal cancer is September. She reports that she is still grieiving but that she's feeling better day by day.   She decided to postpone her breast reduction surgery and get a hysterectomy (partial) first. She has fibroids which cause menorrhagia.   Her iron levels have improved with the supplements and birth control.   Has spina bifida (minor) with occasional leakage robi when she coughs.     Past Medical History:   Diagnosis Date    Allergy     Fibroids     ARNOL (iron deficiency anemia) 2014    Painful orthopaedic hardware 2016    Short toes 2016    Toe contracture 2016       Past Surgical History:   Procedure Laterality Date     SECTION  2013    COLONOSCOPY N/A 10/23/2024    Procedure: COLONOSCOPY;  Surgeon: Keila Seo MD;  Location: [facility];  Service: Endoscopy;  Laterality: N/A;  Referral:  Svitlana Becker MD / suprep / inst portal - LW  10/14/24- case adj. 15 min, lvm/portal for pc. DBM  10/17/24- pc complete. DBM  10/18/24- r/s, instr to portal. DB  10/22 pt confirmed SW    CYSTOSCOPY N/A 2019    Procedure: CYSTOSCOPY;  Surgeon: Pollo Park MD;  Location: [address];  Service: Urology;  Laterality: N/A;  30 min/2nd floor OR    FOOT SURGERY Right     INJECTION OF BOTULINUM TOXIN TYPE A N/A 2019    Procedure: INJECTION, BOTULINUM TOXIN, TYPE A;  Surgeon: Pollo Park MD;  Location: [facility] OR [address] [place] [address];  Service: Urology;  Laterality: N/A;        Family History   Problem Relation Name Age of Onset    Hypertension Mother Umer Rodriguez     Rectal cancer Father Natan Rodriguez     No Known Problems Sister      Breast cancer Neg Hx      Colon cancer Neg Hx      Ovarian cancer Neg Hx      Hyperlipidemia Neg Hx      Heart disease Neg Hx      Diabetes Neg Hx      Arthritis Neg Hx          Social  History     Socioeconomic History    Marital status: Single    Number of children: 2   Tobacco Use    Smoking status: Never    Smokeless tobacco: Never   Substance and Sexual Activity    Alcohol use: Not Currently     Comment: occasional    Drug use: No    Sexual activity: Not Currently     Partners: Male     Birth control/protection: Abstinence, None     Social Drivers of Health     Financial Resource Strain: Patient Declined (11/28/2023)    Overall Financial Resource Strain (CARDIA)     Difficulty of Paying Living Expenses: Patient declined   Food Insecurity: Patient Declined (11/28/2023)    Hunger Vital Sign     Worried About Running Out of Food in the Last Year: Patient declined     Ran Out of Food in the Last Year: Patient declined   Transportation Needs: Patient Declined (12/15/2023)    PRAPARE - Transportation     Lack of Transportation (Medical): Patient declined     Lack of Transportation (Non-Medical): Patient declined   Physical Activity: Unknown (12/15/2023)    Exercise Vital Sign     Days of Exercise per Week: Patient declined     Minutes of Exercise per Session: 60 min   Stress: Stress Concern Present (12/15/2023)    [employer] [place] [employer] - Occupational Stress Questionnaire     Feeling of Stress : Very much   Housing Stability: Patient Declined (12/15/2023)    Housing Stability Vital Sign     Unable to Pay for Housing in the Last Year: Patient declined     Unstable Housing in the Last Year: Patient declined         Current Outpatient Medications:     cetirizine (ZYRTEC) 10 MG tablet, Take 1 tablet (10 mg total) by mouth once daily., Disp: 90 tablet, Rfl: 3    drospirenone-ethinyl estradioL (PHU) 3-0.02 mg per tablet, Take 1 tablet by mouth once daily., Disp: 84 tablet, Rfl: 4    ergocalciferol (ERGOCALCIFEROL) 50,000 unit Cap, Take 1 capsule (50,000 Units total) by mouth every 7 days., Disp: 12 capsule, Rfl: 0    ferrous sulfate (FEROSUL) 325 mg (65 mg iron) Tab tablet, Take 1 tablet  (325 mg total) by mouth once daily., Disp: 90 tablet, Rfl: 0    fluticasone propionate (FLONASE) 50 mcg/actuation nasal spray, SHAKE LIQUID AND USE 1 SPRAY(50 MCG) IN EACH NOSTRIL DAILY, Disp: 32 g, Rfl: 2    ibuprofen (ADVIL,MOTRIN) 800 MG tablet, Take 800 mg by mouth 2 (two) times daily as needed., Disp: , Rfl:      Review of Systems   HENT:  Negative for congestion.    Eyes:  Negative for blurred vision.   Respiratory:  Negative for cough and shortness of breath.    Cardiovascular:  Negative for chest pain and leg swelling.   Gastrointestinal:  Negative for abdominal pain, blood in stool, constipation, diarrhea, melena and nausea.   Genitourinary:  Negative for dysuria.        Menorrhagia   Musculoskeletal:  Negative for joint pain.   Neurological:  Negative for headaches.   Psychiatric/Behavioral:  Negative for depression. The patient is not nervous/anxious.         Objective:      Vitals:    10/24/24 1358   BP: 136/74   Pulse: 71   Resp: 18   Temp: 98.3 °F (36.8 °C)       Physical Exam  Constitutional:       Appearance: Normal appearance.   HENT:      Head: Normocephalic and atraumatic.   Skin:     General: Skin is warm and dry.   Neurological:      General: No focal deficit present.      Mental Status: She is alert and oriented to person, place, and time.   Psychiatric:         Mood and Affect: Mood normal.         Behavior: Behavior normal.          Assessment:       1. Iron deficiency anemia due to chronic blood loss    2. Seasonal allergic rhinitis due to pollen    3. Prediabetes    4. Vitamin D deficiency    5. Spina bifida of lumbosacral region without hydrocephalus    6. Macromastia    7. Menorrhagia with regular cycle    8. Abnormal finding of blood chemistry, unspecified          Plan:   1. Iron deficiency anemia due to chronic blood loss  Assessment & Plan:  2/2 menorrhagia   Improving   - cont iron supplements    Orders:  -     Ferritin; Future; Expected date: 10/24/2025  -     Iron and TIBC; Future;  Expected date: 10/24/2025  -     Comprehensive Metabolic Panel; Future; Expected date: 10/24/2025  -     CBC Auto Differential; Future; Expected date: 10/24/2025    2. Seasonal allergic rhinitis due to pollen  Assessment & Plan:  Worsening due to seasonal change   - refilled zyrtec    Orders:  -     cetirizine (ZYRTEC) 10 MG tablet; Take 1 tablet (10 mg total) by mouth once daily.  Dispense: 90 tablet; Refill: 3    3. Prediabetes  -     Hemoglobin A1C; Future; Expected date: 10/24/2025    4. Vitamin D deficiency  Assessment & Plan:  Improving   - cont vit d 50,000 units weekly    Orders:  -     Vitamin D; Future; Expected date: 10/24/2025    5. Spina bifida of lumbosacral region without hydrocephalus  Assessment & Plan:  Has spina bifida (minor) since birth with occasional leakage robi when she coughs.       6. Macromastia  Assessment & Plan:  She decided to postpone her breast reduction surgery and get a hysterectomy (partial) first.      7. Menorrhagia with regular cycle  Assessment & Plan:  2/2 fibroids   Planned for partial hysterectomy   - cont Melissa      8. Abnormal finding of blood chemistry, unspecified  -     Lipid Panel; Future; Expected date: 10/24/2024         Follow up in about 6 months (around 4/24/2025) for annual.

## 2024-10-29 ENCOUNTER — PATIENT MESSAGE (OUTPATIENT)
Dept: OBSTETRICS AND GYNECOLOGY | Facility: CLINIC | Age: 44
End: 2024-10-29
Payer: COMMERCIAL

## 2024-10-31 ENCOUNTER — PATIENT MESSAGE (OUTPATIENT)
Dept: OBSTETRICS AND GYNECOLOGY | Facility: CLINIC | Age: 44
End: 2024-10-31
Payer: COMMERCIAL

## 2024-11-07 ENCOUNTER — OFFICE VISIT (OUTPATIENT)
Dept: OBSTETRICS AND GYNECOLOGY | Facility: CLINIC | Age: 44
End: 2024-11-07
Payer: COMMERCIAL

## 2024-11-07 ENCOUNTER — HOSPITAL ENCOUNTER (OUTPATIENT)
Dept: PREADMISSION TESTING | Facility: HOSPITAL | Age: 44
Discharge: HOME | End: 2024-11-07
Attending: OBSTETRICS & GYNECOLOGY
Payer: COMMERCIAL

## 2024-11-07 VITALS
SYSTOLIC BLOOD PRESSURE: 105 MMHG | OXYGEN SATURATION: 99 % | HEIGHT: 62 IN | BODY MASS INDEX: 35.31 KG/M2 | HEART RATE: 74 BPM | DIASTOLIC BLOOD PRESSURE: 71 MMHG | DIASTOLIC BLOOD PRESSURE: 70 MMHG | TEMPERATURE: 97 F | SYSTOLIC BLOOD PRESSURE: 110 MMHG | RESPIRATION RATE: 18 BRPM | WEIGHT: 180.75 LBS | BODY MASS INDEX: 33.26 KG/M2 | WEIGHT: 180.75 LBS

## 2024-11-07 DIAGNOSIS — N94.6 DYSMENORRHEA: ICD-10-CM

## 2024-11-07 DIAGNOSIS — N93.9 ABNORMAL UTERINE BLEEDING (AUB): Primary | ICD-10-CM

## 2024-11-07 DIAGNOSIS — N93.9 ABNORMAL UTERINE BLEEDING (AUB): ICD-10-CM

## 2024-11-07 LAB
ALBUMIN SERPL BCP-MCNC: 3 G/DL (ref 3.5–5.2)
ALP SERPL-CCNC: 57 U/L (ref 40–150)
ALT SERPL W/O P-5'-P-CCNC: 18 U/L (ref 10–44)
ANION GAP SERPL CALC-SCNC: 10 MMOL/L (ref 8–16)
AST SERPL-CCNC: 17 U/L (ref 10–40)
BASOPHILS # BLD AUTO: 0.04 K/UL (ref 0–0.2)
BASOPHILS NFR BLD: 0.6 % (ref 0–1.9)
BILIRUB SERPL-MCNC: 0.2 MG/DL (ref 0.1–1)
BUN SERPL-MCNC: 12 MG/DL (ref 6–20)
CALCIUM SERPL-MCNC: 9.3 MG/DL (ref 8.7–10.5)
CHLORIDE SERPL-SCNC: 108 MMOL/L (ref 95–110)
CO2 SERPL-SCNC: 22 MMOL/L (ref 23–29)
CREAT SERPL-MCNC: 0.8 MG/DL (ref 0.5–1.4)
DIFFERENTIAL METHOD BLD: ABNORMAL
EOSINOPHIL # BLD AUTO: 0.2 K/UL (ref 0–0.5)
EOSINOPHIL NFR BLD: 2.3 % (ref 0–8)
ERYTHROCYTE [DISTWIDTH] IN BLOOD BY AUTOMATED COUNT: 18.2 % (ref 11.5–14.5)
EST. GFR  (NO RACE VARIABLE): >60 ML/MIN/1.73 M^2
GLUCOSE SERPL-MCNC: 87 MG/DL (ref 70–110)
HCT VFR BLD AUTO: 40.5 % (ref 37–48.5)
HGB BLD-MCNC: 12.7 G/DL (ref 12–16)
IMM GRANULOCYTES # BLD AUTO: 0.02 K/UL (ref 0–0.04)
IMM GRANULOCYTES NFR BLD AUTO: 0.3 % (ref 0–0.5)
LYMPHOCYTES # BLD AUTO: 1.6 K/UL (ref 1–4.8)
LYMPHOCYTES NFR BLD: 22.4 % (ref 18–48)
MCH RBC QN AUTO: 27 PG (ref 27–31)
MCHC RBC AUTO-ENTMCNC: 31.4 G/DL (ref 32–36)
MCV RBC AUTO: 86 FL (ref 82–98)
MONOCYTES # BLD AUTO: 0.5 K/UL (ref 0.3–1)
MONOCYTES NFR BLD: 7.8 % (ref 4–15)
NEUTROPHILS # BLD AUTO: 4.6 K/UL (ref 1.8–7.7)
NEUTROPHILS NFR BLD: 66.6 % (ref 38–73)
NRBC BLD-RTO: 0 /100 WBC
PLATELET # BLD AUTO: 237 K/UL (ref 150–450)
PMV BLD AUTO: 10.8 FL (ref 9.2–12.9)
POTASSIUM SERPL-SCNC: 4.2 MMOL/L (ref 3.5–5.1)
PROT SERPL-MCNC: 7.1 G/DL (ref 6–8.4)
RBC # BLD AUTO: 4.71 M/UL (ref 4–5.4)
SODIUM SERPL-SCNC: 140 MMOL/L (ref 136–145)
WBC # BLD AUTO: 6.96 K/UL (ref 3.9–12.7)

## 2024-11-07 PROCEDURE — 80053 COMPREHEN METABOLIC PANEL: CPT | Performed by: OBSTETRICS & GYNECOLOGY

## 2024-11-07 PROCEDURE — 99499 UNLISTED E&M SERVICE: CPT | Mod: S$GLB,,, | Performed by: OBSTETRICS & GYNECOLOGY

## 2024-11-07 PROCEDURE — 85025 COMPLETE CBC W/AUTO DIFF WBC: CPT | Performed by: OBSTETRICS & GYNECOLOGY

## 2024-11-07 PROCEDURE — 99999 PR PBB SHADOW E&M-EST. PATIENT-LVL III: CPT | Mod: PBBFAC,,, | Performed by: OBSTETRICS & GYNECOLOGY

## 2024-11-07 RX ORDER — CEFAZOLIN SODIUM 2 G/50ML
2 SOLUTION INTRAVENOUS
OUTPATIENT
Start: 2024-11-07

## 2024-11-07 RX ORDER — MUPIROCIN 20 MG/G
OINTMENT TOPICAL
OUTPATIENT
Start: 2024-11-07

## 2024-11-07 RX ORDER — FAMOTIDINE 20 MG/1
20 TABLET, FILM COATED ORAL
OUTPATIENT
Start: 2024-11-07

## 2024-11-07 RX ORDER — SODIUM CHLORIDE, SODIUM LACTATE, POTASSIUM CHLORIDE, CALCIUM CHLORIDE 600; 310; 30; 20 MG/100ML; MG/100ML; MG/100ML; MG/100ML
INJECTION, SOLUTION INTRAVENOUS CONTINUOUS
OUTPATIENT
Start: 2024-11-07

## 2024-11-07 NOTE — H&P
Subjective     Patient ID: Umer Rodriguez is a 44 y.o. female.    Chief Complaint:  Pre-op Exam      History of Present Illness  HPI  Has known uterine fibroids and has abnormal bleeding that has caused anemia.  Has tried OCP, but that has not worked.  Would like to proceed with definitive surgical management with [facility].    GYN & OB History  Patient's last menstrual period was 10/21/2024 (approximate).   Date of Last Pap: 2024    OB History    Para Term  AB Living   3 3 2 1   2   SAB IAB Ectopic Multiple Live Births           2      # Outcome Date GA Lbr Diogo/2nd Weight Sex Type Anes PTL Lv   3  13   2.495 kg (5 lb 8 oz) M CS-Unspec   VALERIE   2 Term 12/08/10 40w0d  3.856 kg (8 lb 8 oz) F CS-LTranv   VALERIE   1 Term              Past Medical History:  Past Medical History:   Diagnosis Date    Allergy     Fibroids     ARNOL (iron deficiency anemia) 2014    Painful orthopaedic hardware 2016    Short toes 2016    Toe contracture 2016       Past Surgical History:  Past Surgical History:   Procedure Laterality Date     SECTION  ,     COLONOSCOPY N/A 10/23/2024    Procedure: COLONOSCOPY;  Surgeon: Keila Seo MD;  Location: [facility];  Service: Endoscopy;  Laterality: N/A;  Referral:  Svitlana Becker MD / suprep / [facility] portal - LW  10/14/24- case adj. 15 min, lvm/portal for pc. DBM  10/17/24- pc complete. DBM  10/18/24- r/s, instr to portal. DB  10/22 pt confirmed SW    CYSTOSCOPY N/A 2019    Procedure: CYSTOSCOPY;  Surgeon: Pollo Park MD;  Location: [facility] OR [address] [place] [address];  Service: Urology;  Laterality: N/A;  30 min/2nd floor OR    FOOT SURGERY Right     INJECTION OF BOTULINUM TOXIN TYPE A N/A 2019    Procedure: INJECTION, BOTULINUM TOXIN, TYPE A;  Surgeon: Pollo Park MD;  Location: [facility] OR [address] [place] [address];  Service: Urology;  Laterality: N/A;       Family History:  Family History   Problem Relation Name Age of Onset    Hypertension  Mother Umer Rodriguez     Rectal cancer Father Natan Rodriguez     No Known Problems Sister      Breast cancer Neg Hx      Colon cancer Neg Hx      Ovarian cancer Neg Hx      Hyperlipidemia Neg Hx      Heart disease Neg Hx      Diabetes Neg Hx      Arthritis Neg Hx         Allergies:  Review of patient's allergies indicates:   Allergen Reactions    Oxycodone Anaphylaxis       Medications:  Current Outpatient Medications on File Prior to Visit   Medication Sig Dispense Refill    cetirizine (ZYRTEC) 10 MG tablet Take 1 tablet (10 mg total) by mouth once daily. 90 tablet 3    drospirenone-ethinyl estradioL (PHU) 3-0.02 mg per tablet Take 1 tablet by mouth once daily. 84 tablet 4    ergocalciferol (ERGOCALCIFEROL) 50,000 unit Cap Take 1 capsule (50,000 Units total) by mouth every 7 days. 12 capsule 0    ferrous sulfate (FEROSUL) 325 mg (65 mg iron) Tab tablet Take 1 tablet (325 mg total) by mouth once daily. 90 tablet 0    fluticasone propionate (FLONASE) 50 mcg/actuation nasal spray SHAKE LIQUID AND USE 1 SPRAY(50 MCG) IN EACH NOSTRIL DAILY 32 g 2    ibuprofen (ADVIL,MOTRIN) 800 MG tablet Take 800 mg by mouth 2 (two) times daily as needed.       No current facility-administered medications on file prior to visit.       Social History:  Social History     Tobacco Use    Smoking status: Never     Passive exposure: Never    Smokeless tobacco: Never   Substance Use Topics    Alcohol use: Not Currently     Comment: occasional    Drug use: No             Review of Systems  Review of Systems   Constitutional: Negative.    HENT: Negative.     Eyes: Negative.    Respiratory: Negative.     Cardiovascular: Negative.    Gastrointestinal: Negative.    Endocrine: Negative.    Genitourinary:  Positive for menorrhagia and menstrual problem.   Musculoskeletal: Negative.    Integumentary:  Negative.   Neurological: Negative.    Hematological: Negative.    Psychiatric/Behavioral: Negative.     Breast: negative.           Objective    Physical Exam:   Constitutional: She is oriented to person, place, and time. She appears well-developed.    HENT:   Head: Normocephalic and atraumatic.    Eyes: EOM are normal.     Cardiovascular:  Normal rate.             Pulmonary/Chest: Effort normal.        Abdominal: Soft. There is no abdominal tenderness.             Musculoskeletal: Normal range of motion.       Neurological: She is oriented to person, place, and time.    Skin: Skin is warm.    Psychiatric: She has a normal mood and affect.      Results for orders placed during the hospital encounter of 09/16/24    US Pelvis Comp with Transvag NON-OB (xpd    Narrative  EXAMINATION:  US PELVIS COMP WITH TRANSVAG NON-OB (XPD)    CLINICAL HISTORY:  Abnormal uterine and vaginal bleeding, unspecified    TECHNIQUE:  Transabdominal sonography of the pelvis was performed, followed by transvaginal sonography to better evaluate the uterus and ovaries.    COMPARISON:  Pelvic ultrasound 10/16/2023, 10/15/2021.    FINDINGS:  Uterus:    Size: 9.1 x 4.9 x 5.7 cm    Masses: 3 uterine fibroids, measuring up to 3.4 cm, 1.7 cm, 1.2 cm respectively.  The largest fibroid appears stable compared to prior.  The smaller 2 fibroids appear new compared to prior.    Endometrium: Normal in this pre menopausal patient, measuring 8 mm.    Right ovary:    Size: 2.0 x 0.7 x 2.1 cm    Appearance: Normal    Vascular flow: Normal.  Resistive index measures 0.72.    Left ovary:    Size: 2.1 x 1.2 x 2.2 cm    Appearance: Normal    Vascular Flow: Normal.  This is appendix measures 0.68    Free Fluid:    None.    Impression  Multi fibroid uterus as detailed above.    Electronically signed by resident: Juan Alberto Andrew  Date:    09/16/2024  Time:    11:52    Electronically signed by: Andrew Marc MD  Date:    09/16/2024  Time:    12:21              Assessment and Plan     1. Abnormal uterine bleeding (AUB)    2. Dysmenorrhea             Plan:  1. Abnormal uterine bleeding (AUB) (Primary)  2.  Dysmenorrhea  - Risks, benefits, and alternatives of TLH reviewed with patient.  She voiced understanding.  All questions answered.  Consents are signed and on the chart.   She declines blood transfusion.    Medicaid hysterectomy acknowledgement form signed.      - Place in Outpatient; Standing  - Full code; Standing  - Vital signs; Standing  - Insert peripheral IV; Standing  - Eller to Gravity; Standing  - POCT glucose; Standing  - Notify physician if BS > 180 for hysterectomy patients; Standing  - Chlorhexidine (CHG) 2% Wipes; Standing  - Notify Physician/Vital Signs Parameters Urine output less than 0.5mL/kg/hr (with indwelling catheter) or 30 mL/hr (without indwelling catheter) or blood glucose greater than 200 mg/dL; Standing  - Notify physician; Standing  - Notify Physician - Potential Need of Opioid Reversal; Standing  - Diet NPO; Standing  - Place sequential compression device; Standing  - Chlorohexidine Gluconate Bath; Standing  - Comprehensive metabolic panel; Standing  - Pregnancy, urine rapid; Standing  - Type & Screen Pre Op; Standing  - Diet NPO; Standing  - CBC auto differential; Standing

## 2024-11-07 NOTE — DISCHARGE INSTRUCTIONS
YOUR PROCEDURE WILL BE AT [facility] at [address] [place] [address]                 Enter through the Main Entrance facing Nighat [address].                 Report to the Same Day Surgery Registration Desk in the hallway.(Just beside the Same Day Surgery Unit)      Your procedure  is scheduled for __11/14/2024________.    Call [phone] between 2pm and 5pm on __11/13/2024_____to find out your arrival time for the day of surgery.    You may have two visitors.  No children under 12 years old.     You will be going to the Same Day Surgery Unit on the 2nd floor of the hospital.    Important instructions:  Do not eat anything after midnight.  You may have plain water, non carbonated.  You may also have Gatorade or Powerade after midnight.    Stop all fluids 2 hours before your surgery.    It is okay to brush your teeth.  Do not have gum, candy or mints.    SEE MEDICATION SHEET.   TAKE MEDICATIONS AS DIRECTED.      All GLP-1 weekly diabetic/weight loss medications must not be taken for one week before your surgery, or your surgery could be canceled.      STOP taking for 7 days before surgery:    Aspirin           Voltaren (Diclofenac)  Ibuprofen  (Advil, Motrin)                Indomethocin  Mobic (meloxicam, celebrex)      Etodolac   Aleve (naproxen)          Toradol (ketoralac)  Fish oil, Krill oil and Vitamin E  Headache Powders (BC Powder, Goody's Powder, [place])                           You may take Tylenol if needed which is not a blood thinner.    Please shower the night before and the morning of your surgery.      Use Chlorhexidine soap as instructed by your pre op nurse.   Please place clean linens on your bed the night before surgery. Please wear fresh clean clothing after each shower.    No shaving of procedural area at least 4-5 days before surgery due to increased risk of skin irritation and/or possible infection.    Female patients may be asked for a urine  specimen on the morning of the surgery.  Please check with your nurse before using the restroom.    Contact lenses and removable denture work may not be worn during your procedure.    You may wear deodorant only. If you are having breast surgery, do not wear deodorant on the operative side.    Do not wear powder, body lotion, perfume/cologne or make-up.    Do not wear any jewelry or have any metal on your body.    You will be asked to remove any dentures or partials for the procedure.    If you are going home on the same day of surgery, you must arrange for a family member or a friend to drive you home.  Public transportation is prohibited.  You will not be able to drive home if you were given anesthesia or sedation.    Patients who want to have their Post-op prescriptions filled from our in-house [facility], bring a Credit/Debit Card or cash with you. A co-pay may be required.  The pharmacy closes at 5:30 pm.    Wear loose fitting clothes allowing for bandages.    Please leave money and valuables home.      You may bring your cell phone.    Call the doctor if fever or illness should occur before your surgery.    Call [phone] to contact us here if needed.                            CLOTHES ON DAY OF SURGERY    SHOULDER surgery:  you must have a very oversized shirt.  Very, Very large.  You will probably have a large sling on with your arm strapped to your chest.  You will not be able to put the arm of the operated shoulder into a sleeve.  You can put the arm of the un-operated shoulder into the sleeve, but the shirt will need to be draped over the operated shoulder.       ARM or HAND surgery:  make sure that your sleeves are large and loose enough to pass over large dressings or cast.      BREAST or UNDERARM surgery:  wear a loose, button down shirt so that you can dress without raising your arms over your head.    ABDOMINAL surgery:  wear loose, comfortable clothing.  Nothing tight around the abdomen.  NO  JEANS    PENIS or SCROTAL surgery:  loose comfortable clothing.  Large sweat pants, pajama pants or a robe.  ABSOLUTELY NO JEANS      LEG or FOOT surgery:  wear large loose pants that are able to pass over any large dressings or casts.  You could also wear loose shorts or a skirt.

## 2024-11-07 NOTE — ANESTHESIA PREPROCEDURE EVALUATION
2024  Umer Rodriguez is a 44 y.o., female scheduled for  HYSTERECTOMY,TOTAL,LAPAROSCOPIC,WITH SALPINGECTOMY (Uterus) on 2024.    Past Medical History:   Diagnosis Date    Allergy     Fibroids     ARNOL (iron deficiency anemia) 2014    Painful orthopaedic hardware 2016    Short toes 2016    Toe contracture 2016       Past Surgical History:   Procedure Laterality Date     SECTION  ,     COLONOSCOPY N/A 10/23/2024    Procedure: COLONOSCOPY;  Surgeon: Keila Seo MD;  Location: [facility];  Service: Endoscopy;  Laterality: N/A;  Referral:  Svitlana Becker MD / suprep / inst portal - LW  10/14/24- case adj. 15 min, lvm/portal for pc. DBM  10/17/24- pc complete. DB  10/18/24- r/s, instr to portal. [facility]  10/22 pt confirmed SW    CYSTOSCOPY N/A 2019    Procedure: CYSTOSCOPY;  Surgeon: Pollo Park MD;  Location: [address];  Service: Urology;  Laterality: N/A;  30 min/2nd floor OR    FOOT SURGERY Right     INJECTION OF BOTULINUM TOXIN TYPE A N/A 2019    Procedure: INJECTION, BOTULINUM TOXIN, TYPE A;  Surgeon: Pollo Park MD;  Location: [address];  Service: Urology;  Laterality: N/A;           Pre-op Assessment    I have reviewed the Patient Summary Reports.     I have reviewed the Nursing Notes. I have reviewed the NPO Status.   I have reviewed the Medications.     Review of Systems  Anesthesia Hx:  No problems with previous Anesthesia             Denies Family Hx of Anesthesia complications.    Denies Personal Hx of Anesthesia complications.                    Social:  Non-Smoker, Social Alcohol Use       Hematology/Oncology:  Hematology Normal   Oncology Normal                                   EENT/Dental:  EENT/Dental Normal           Cardiovascular:  Exercise tolerance: good                     Functional Capacity good  / => 4 METS                         Pulmonary:  Pulmonary Normal                       Renal/:  Renal/ Normal                 Hepatic/GI:  Hepatic/GI Normal                    Musculoskeletal:  Musculoskeletal Normal                Neurological:  Neurology Normal                                      Endocrine:        Obesity / BMI > 30  Dermatological:  Skin Normal    Psych:  Psychiatric Normal                       Anesthesia Plan  Type of Anesthesia, risks & benefits discussed:    Anesthesia Type: Gen ETT  Intra-op Monitoring Plan: Standard ASA Monitors  Post Op Pain Control Plan: multimodal analgesia  Induction:  IV  Airway Plan: Direct and Video, Post-Induction  Informed Consent: Informed consent signed with the Patient and all parties understand the risks and agree with anesthesia plan.  All questions answered.   ASA Score: 1  Day of Surgery Review of History & Physical: H&P Update referred to the surgeon/provider.    Ready For Surgery From Anesthesia Perspective.     .

## 2024-11-07 NOTE — H&P (VIEW-ONLY)
Subjective     Patient ID: Umer Rodriguez is a 44 y.o. female.    Chief Complaint:  Pre-op Exam      History of Present Illness  HPI  Has known uterine fibroids and has abnormal bleeding that has caused anemia.  Has tried OCP, but that has not worked.  Would like to proceed with definitive surgical management with [facility].    GYN & OB History  Patient's last menstrual period was 10/21/2024 (approximate).   Date of Last Pap: 2024    OB History    Para Term  AB Living   3 3 2 1   2   SAB IAB Ectopic Multiple Live Births           2      # Outcome Date GA Lbr Diogo/2nd Weight Sex Type Anes PTL Lv   3  13   2.495 kg (5 lb 8 oz) M CS-Unspec   VALERIE   2 Term 12/08/10 40w0d  3.856 kg (8 lb 8 oz) F CS-LTranv   VALERIE   1 Term              Past Medical History:  Past Medical History:   Diagnosis Date    Allergy     Fibroids     ARNOL (iron deficiency anemia) 2014    Painful orthopaedic hardware 2016    Short toes 2016    Toe contracture 2016       Past Surgical History:  Past Surgical History:   Procedure Laterality Date     SECTION  ,     COLONOSCOPY N/A 10/23/2024    Procedure: COLONOSCOPY;  Surgeon: Keila Seo MD;  Location: [facility];  Service: Endoscopy;  Laterality: N/A;  Referral:  Svitlana Becker MD / suprep / [facility] portal - LW  10/14/24- case adj. 15 min, lvm/portal for pc. DBM  10/17/24- pc complete. DBM  10/18/24- r/s, instr to portal. DB  10/22 pt confirmed SW    CYSTOSCOPY N/A 2019    Procedure: CYSTOSCOPY;  Surgeon: Pollo Park MD;  Location: [facility] OR [address] [place] [address];  Service: Urology;  Laterality: N/A;  30 min/2nd floor OR    FOOT SURGERY Right     INJECTION OF BOTULINUM TOXIN TYPE A N/A 2019    Procedure: INJECTION, BOTULINUM TOXIN, TYPE A;  Surgeon: Pollo Park MD;  Location: [facility] OR [address] [place] [address];  Service: Urology;  Laterality: N/A;       Family History:  Family History   Problem Relation Name Age of Onset    Hypertension  Mother Umer Rodriguez     Rectal cancer Father Natan Rodriguez     No Known Problems Sister      Breast cancer Neg Hx      Colon cancer Neg Hx      Ovarian cancer Neg Hx      Hyperlipidemia Neg Hx      Heart disease Neg Hx      Diabetes Neg Hx      Arthritis Neg Hx         Allergies:  Review of patient's allergies indicates:   Allergen Reactions    Oxycodone Anaphylaxis       Medications:  Current Outpatient Medications on File Prior to Visit   Medication Sig Dispense Refill    cetirizine (ZYRTEC) 10 MG tablet Take 1 tablet (10 mg total) by mouth once daily. 90 tablet 3    drospirenone-ethinyl estradioL (PHU) 3-0.02 mg per tablet Take 1 tablet by mouth once daily. 84 tablet 4    ergocalciferol (ERGOCALCIFEROL) 50,000 unit Cap Take 1 capsule (50,000 Units total) by mouth every 7 days. 12 capsule 0    ferrous sulfate (FEROSUL) 325 mg (65 mg iron) Tab tablet Take 1 tablet (325 mg total) by mouth once daily. 90 tablet 0    fluticasone propionate (FLONASE) 50 mcg/actuation nasal spray SHAKE LIQUID AND USE 1 SPRAY(50 MCG) IN EACH NOSTRIL DAILY 32 g 2    ibuprofen (ADVIL,MOTRIN) 800 MG tablet Take 800 mg by mouth 2 (two) times daily as needed.       No current facility-administered medications on file prior to visit.       Social History:  Social History     Tobacco Use    Smoking status: Never     Passive exposure: Never    Smokeless tobacco: Never   Substance Use Topics    Alcohol use: Not Currently     Comment: occasional    Drug use: No             Review of Systems  Review of Systems   Constitutional: Negative.    HENT: Negative.     Eyes: Negative.    Respiratory: Negative.     Cardiovascular: Negative.    Gastrointestinal: Negative.    Endocrine: Negative.    Genitourinary:  Positive for menorrhagia and menstrual problem.   Musculoskeletal: Negative.    Integumentary:  Negative.   Neurological: Negative.    Hematological: Negative.    Psychiatric/Behavioral: Negative.     Breast: negative.           Objective    Physical Exam:   Constitutional: She is oriented to person, place, and time. She appears well-developed.    HENT:   Head: Normocephalic and atraumatic.    Eyes: EOM are normal.     Cardiovascular:  Normal rate.             Pulmonary/Chest: Effort normal.        Abdominal: Soft. There is no abdominal tenderness.             Musculoskeletal: Normal range of motion.       Neurological: She is oriented to person, place, and time.    Skin: Skin is warm.    Psychiatric: She has a normal mood and affect.      Results for orders placed during the hospital encounter of 09/16/24    US Pelvis Comp with Transvag NON-OB (xpd    Narrative  EXAMINATION:  US PELVIS COMP WITH TRANSVAG NON-OB (XPD)    CLINICAL HISTORY:  Abnormal uterine and vaginal bleeding, unspecified    TECHNIQUE:  Transabdominal sonography of the pelvis was performed, followed by transvaginal sonography to better evaluate the uterus and ovaries.    COMPARISON:  Pelvic ultrasound 10/16/2023, 10/15/2021.    FINDINGS:  Uterus:    Size: 9.1 x 4.9 x 5.7 cm    Masses: 3 uterine fibroids, measuring up to 3.4 cm, 1.7 cm, 1.2 cm respectively.  The largest fibroid appears stable compared to prior.  The smaller 2 fibroids appear new compared to prior.    Endometrium: Normal in this pre menopausal patient, measuring 8 mm.    Right ovary:    Size: 2.0 x 0.7 x 2.1 cm    Appearance: Normal    Vascular flow: Normal.  Resistive index measures 0.72.    Left ovary:    Size: 2.1 x 1.2 x 2.2 cm    Appearance: Normal    Vascular Flow: Normal.  This is appendix measures 0.68    Free Fluid:    None.    Impression  Multi fibroid uterus as detailed above.    Electronically signed by resident: Juan Alberto Andrew  Date:    09/16/2024  Time:    11:52    Electronically signed by: Andrew Marc MD  Date:    09/16/2024  Time:    12:21              Assessment and Plan     1. Abnormal uterine bleeding (AUB)    2. Dysmenorrhea             Plan:  1. Abnormal uterine bleeding (AUB) (Primary)  2.  Dysmenorrhea  - Risks, benefits, and alternatives of TLH reviewed with patient.  She voiced understanding.  All questions answered.  Consents are signed and on the chart.   She declines blood transfusion.    Medicaid hysterectomy acknowledgement form signed.      - Place in Outpatient; Standing  - Full code; Standing  - Vital signs; Standing  - Insert peripheral IV; Standing  - Eller to Gravity; Standing  - POCT glucose; Standing  - Notify physician if BS > 180 for hysterectomy patients; Standing  - Chlorhexidine (CHG) 2% Wipes; Standing  - Notify Physician/Vital Signs Parameters Urine output less than 0.5mL/kg/hr (with indwelling catheter) or 30 mL/hr (without indwelling catheter) or blood glucose greater than 200 mg/dL; Standing  - Notify physician; Standing  - Notify Physician - Potential Need of Opioid Reversal; Standing  - Diet NPO; Standing  - Place sequential compression device; Standing  - Chlorohexidine Gluconate Bath; Standing  - Comprehensive metabolic panel; Standing  - Pregnancy, urine rapid; Standing  - Type & Screen Pre Op; Standing  - Diet NPO; Standing  - CBC auto differential; Standing

## 2024-11-12 ENCOUNTER — ANESTHESIA (OUTPATIENT)
Dept: SURGERY | Facility: HOSPITAL | Age: 44
End: 2024-11-12
Payer: COMMERCIAL

## 2024-11-13 ENCOUNTER — PATIENT MESSAGE (OUTPATIENT)
Dept: PLASTIC SURGERY | Facility: CLINIC | Age: 44
End: 2024-11-13
Payer: COMMERCIAL

## 2024-11-13 ENCOUNTER — PATIENT MESSAGE (OUTPATIENT)
Dept: OBSTETRICS AND GYNECOLOGY | Facility: CLINIC | Age: 44
End: 2024-11-13
Payer: COMMERCIAL

## 2024-11-14 ENCOUNTER — HOSPITAL ENCOUNTER (OUTPATIENT)
Facility: HOSPITAL | Age: 44
Discharge: HOME | End: 2024-11-14
Attending: OBSTETRICS & GYNECOLOGY | Admitting: OBSTETRICS & GYNECOLOGY
Payer: COMMERCIAL

## 2024-11-14 VITALS
WEIGHT: 180.13 LBS | OXYGEN SATURATION: 96 % | RESPIRATION RATE: 16 BRPM | DIASTOLIC BLOOD PRESSURE: 68 MMHG | TEMPERATURE: 98 F | HEART RATE: 76 BPM | SYSTOLIC BLOOD PRESSURE: 111 MMHG | BODY MASS INDEX: 32.94 KG/M2

## 2024-11-14 DIAGNOSIS — Z90.710 S/P LAPAROSCOPIC HYSTERECTOMY: Primary | ICD-10-CM

## 2024-11-14 DIAGNOSIS — N94.6 DYSMENORRHEA: ICD-10-CM

## 2024-11-14 DIAGNOSIS — N93.9 ABNORMAL UTERINE BLEEDING (AUB): ICD-10-CM

## 2024-11-14 LAB
B-HCG UR QL: NEGATIVE
CTP QC/QA: YES
POCT GLUCOSE: 99 MG/DL (ref 70–110)

## 2024-11-14 PROCEDURE — 37000008 HC ANESTHESIA 1ST 15 MINUTES: Performed by: OBSTETRICS & GYNECOLOGY

## 2024-11-14 PROCEDURE — 25000003 PHARM REV CODE 250: Performed by: STUDENT IN AN ORGANIZED HEALTH CARE EDUCATION/TRAINING PROGRAM

## 2024-11-14 PROCEDURE — 58571 TLH W/T/O 250 G OR LESS: CPT | Mod: 22,,, | Performed by: OBSTETRICS & GYNECOLOGY

## 2024-11-14 PROCEDURE — 71000039 HC RECOVERY, EACH ADD'L HOUR: Performed by: OBSTETRICS & GYNECOLOGY

## 2024-11-14 PROCEDURE — 63600175 PHARM REV CODE 636 W HCPCS: Performed by: ANESTHESIOLOGY

## 2024-11-14 PROCEDURE — 63600175 PHARM REV CODE 636 W HCPCS: Performed by: OBSTETRICS & GYNECOLOGY

## 2024-11-14 PROCEDURE — 71000015 HC POSTOP RECOV 1ST HR: Performed by: OBSTETRICS & GYNECOLOGY

## 2024-11-14 PROCEDURE — 27201423 OPTIME MED/SURG SUP & DEVICES STERILE SUPPLY: Performed by: OBSTETRICS & GYNECOLOGY

## 2024-11-14 PROCEDURE — 36000711: Performed by: OBSTETRICS & GYNECOLOGY

## 2024-11-14 PROCEDURE — 63600175 PHARM REV CODE 636 W HCPCS: Performed by: STUDENT IN AN ORGANIZED HEALTH CARE EDUCATION/TRAINING PROGRAM

## 2024-11-14 PROCEDURE — 58571 TLH W/T/O 250 G OR LESS: CPT | Mod: 80,22,, | Performed by: STUDENT IN AN ORGANIZED HEALTH CARE EDUCATION/TRAINING PROGRAM

## 2024-11-14 PROCEDURE — 25000003 PHARM REV CODE 250: Performed by: ANESTHESIOLOGY

## 2024-11-14 PROCEDURE — 37000009 HC ANESTHESIA EA ADD 15 MINS: Performed by: OBSTETRICS & GYNECOLOGY

## 2024-11-14 PROCEDURE — 88307 TISSUE EXAM BY PATHOLOGIST: CPT | Performed by: PATHOLOGY

## 2024-11-14 PROCEDURE — 36000710: Performed by: OBSTETRICS & GYNECOLOGY

## 2024-11-14 PROCEDURE — 63600175 PHARM REV CODE 636 W HCPCS: Mod: JZ,JG | Performed by: OBSTETRICS & GYNECOLOGY

## 2024-11-14 PROCEDURE — 82962 GLUCOSE BLOOD TEST: CPT | Performed by: OBSTETRICS & GYNECOLOGY

## 2024-11-14 PROCEDURE — 81025 URINE PREGNANCY TEST: CPT | Performed by: OBSTETRICS & GYNECOLOGY

## 2024-11-14 PROCEDURE — C2628 CATHETER, OCCLUSION: HCPCS | Performed by: OBSTETRICS & GYNECOLOGY

## 2024-11-14 PROCEDURE — 25000003 PHARM REV CODE 250: Performed by: OBSTETRICS & GYNECOLOGY

## 2024-11-14 PROCEDURE — 71000033 HC RECOVERY, INTIAL HOUR: Performed by: OBSTETRICS & GYNECOLOGY

## 2024-11-14 RX ORDER — DIPHENHYDRAMINE HCL 25 MG
25 CAPSULE ORAL EVERY 4 HOURS PRN
Status: DISCONTINUED | OUTPATIENT
Start: 2024-11-14 | End: 2024-11-14 | Stop reason: HOSPADM

## 2024-11-14 RX ORDER — DOCUSATE SODIUM 100 MG/1
100 CAPSULE, LIQUID FILLED ORAL 2 TIMES DAILY
Status: DISCONTINUED | OUTPATIENT
Start: 2024-11-14 | End: 2024-11-14 | Stop reason: HOSPADM

## 2024-11-14 RX ORDER — LORAZEPAM 2 MG/ML
0.25 INJECTION INTRAMUSCULAR ONCE AS NEEDED
Status: DISCONTINUED | OUTPATIENT
Start: 2024-11-14 | End: 2024-11-14 | Stop reason: HOSPADM

## 2024-11-14 RX ORDER — ACETAMINOPHEN 500 MG
1000 TABLET ORAL
Status: COMPLETED | OUTPATIENT
Start: 2024-11-14 | End: 2024-11-14

## 2024-11-14 RX ORDER — FAMOTIDINE 20 MG/1
20 TABLET, FILM COATED ORAL
Status: COMPLETED | OUTPATIENT
Start: 2024-11-14 | End: 2024-11-14

## 2024-11-14 RX ORDER — ACETAMINOPHEN 500 MG
1000 TABLET ORAL EVERY 6 HOURS PRN
Qty: 100 TABLET | Refills: 2 | Status: SHIPPED | OUTPATIENT
Start: 2024-11-14 | End: 2025-11-14

## 2024-11-14 RX ORDER — MIDAZOLAM HYDROCHLORIDE 1 MG/ML
INJECTION INTRAMUSCULAR; INTRAVENOUS
Status: DISCONTINUED | OUTPATIENT
Start: 2024-11-14 | End: 2024-11-14

## 2024-11-14 RX ORDER — HYDROMORPHONE HYDROCHLORIDE 2 MG/ML
1 INJECTION, SOLUTION INTRAMUSCULAR; INTRAVENOUS; SUBCUTANEOUS EVERY 6 HOURS PRN
Status: DISCONTINUED | OUTPATIENT
Start: 2024-11-14 | End: 2024-11-14 | Stop reason: HOSPADM

## 2024-11-14 RX ORDER — SCOLOPAMINE TRANSDERMAL SYSTEM 1 MG/1
PATCH, EXTENDED RELEASE TRANSDERMAL
Status: DISCONTINUED | OUTPATIENT
Start: 2024-11-14 | End: 2024-11-14

## 2024-11-14 RX ORDER — DIPHENHYDRAMINE HYDROCHLORIDE 50 MG/ML
25 INJECTION INTRAMUSCULAR; INTRAVENOUS EVERY 4 HOURS PRN
Status: DISCONTINUED | OUTPATIENT
Start: 2024-11-14 | End: 2024-11-14 | Stop reason: HOSPADM

## 2024-11-14 RX ORDER — SODIUM CHLORIDE, SODIUM LACTATE, POTASSIUM CHLORIDE, CALCIUM CHLORIDE 600; 310; 30; 20 MG/100ML; MG/100ML; MG/100ML; MG/100ML
INJECTION, SOLUTION INTRAVENOUS CONTINUOUS
Status: DISCONTINUED | OUTPATIENT
Start: 2024-11-14 | End: 2024-11-14 | Stop reason: HOSPADM

## 2024-11-14 RX ORDER — LIDOCAINE HYDROCHLORIDE 20 MG/ML
INJECTION INTRAVENOUS
Status: DISCONTINUED | OUTPATIENT
Start: 2024-11-14 | End: 2024-11-14

## 2024-11-14 RX ORDER — CEFAZOLIN 2 G/1
2 INJECTION, POWDER, FOR SOLUTION INTRAMUSCULAR; INTRAVENOUS
Status: COMPLETED | OUTPATIENT
Start: 2024-11-14 | End: 2024-11-14

## 2024-11-14 RX ORDER — HYDROMORPHONE HYDROCHLORIDE 2 MG/ML
1 INJECTION, SOLUTION INTRAMUSCULAR; INTRAVENOUS; SUBCUTANEOUS EVERY 4 HOURS PRN
Status: DISCONTINUED | OUTPATIENT
Start: 2024-11-14 | End: 2024-11-14 | Stop reason: HOSPADM

## 2024-11-14 RX ORDER — DOCUSATE SODIUM 100 MG/1
100 CAPSULE, LIQUID FILLED ORAL 2 TIMES DAILY
Qty: 60 CAPSULE | Refills: 1 | Status: SHIPPED | OUTPATIENT
Start: 2024-11-14 | End: 2025-11-14

## 2024-11-14 RX ORDER — DIPHENHYDRAMINE HYDROCHLORIDE 50 MG/ML
25 INJECTION INTRAMUSCULAR; INTRAVENOUS EVERY 6 HOURS PRN
Status: DISCONTINUED | OUTPATIENT
Start: 2024-11-14 | End: 2024-11-14 | Stop reason: HOSPADM

## 2024-11-14 RX ORDER — IBUPROFEN 800 MG/1
800 TABLET ORAL 2 TIMES DAILY PRN
Qty: 60 TABLET | Refills: 2 | Status: SHIPPED | OUTPATIENT
Start: 2024-11-14

## 2024-11-14 RX ORDER — HYDROMORPHONE HYDROCHLORIDE 2 MG/1
2 TABLET ORAL EVERY 4 HOURS PRN
Qty: 20 TABLET | Refills: 0 | Status: SHIPPED | OUTPATIENT
Start: 2024-11-14

## 2024-11-14 RX ORDER — FENTANYL CITRATE 50 UG/ML
INJECTION, SOLUTION INTRAMUSCULAR; INTRAVENOUS
Status: DISCONTINUED | OUTPATIENT
Start: 2024-11-14 | End: 2024-11-14

## 2024-11-14 RX ORDER — HYDROMORPHONE HYDROCHLORIDE 2 MG/ML
0.2 INJECTION, SOLUTION INTRAMUSCULAR; INTRAVENOUS; SUBCUTANEOUS EVERY 5 MIN PRN
Status: DISCONTINUED | OUTPATIENT
Start: 2024-11-14 | End: 2024-11-14 | Stop reason: HOSPADM

## 2024-11-14 RX ORDER — HYDROMORPHONE HYDROCHLORIDE 2 MG/1
2 TABLET ORAL ONCE
Status: COMPLETED | OUTPATIENT
Start: 2024-11-14 | End: 2024-11-14

## 2024-11-14 RX ORDER — PHENYLEPHRINE HYDROCHLORIDE 10 MG/ML
INJECTION INTRAVENOUS
Status: DISCONTINUED | OUTPATIENT
Start: 2024-11-14 | End: 2024-11-14

## 2024-11-14 RX ORDER — HYDROMORPHONE HYDROCHLORIDE 2 MG/ML
0.5 INJECTION, SOLUTION INTRAMUSCULAR; INTRAVENOUS; SUBCUTANEOUS
Status: DISCONTINUED | OUTPATIENT
Start: 2024-11-14 | End: 2024-11-14 | Stop reason: HOSPADM

## 2024-11-14 RX ORDER — PROPOFOL 10 MG/ML
VIAL (ML) INTRAVENOUS
Status: DISCONTINUED | OUTPATIENT
Start: 2024-11-14 | End: 2024-11-14

## 2024-11-14 RX ORDER — IBUPROFEN 600 MG/1
600 TABLET ORAL EVERY 6 HOURS PRN
Status: DISCONTINUED | OUTPATIENT
Start: 2024-11-14 | End: 2024-11-14 | Stop reason: HOSPADM

## 2024-11-14 RX ORDER — MUPIROCIN 20 MG/G
OINTMENT TOPICAL
Status: DISCONTINUED | OUTPATIENT
Start: 2024-11-14 | End: 2024-11-14 | Stop reason: HOSPADM

## 2024-11-14 RX ORDER — ROCURONIUM BROMIDE 10 MG/ML
INJECTION, SOLUTION INTRAVENOUS
Status: DISCONTINUED | OUTPATIENT
Start: 2024-11-14 | End: 2024-11-14

## 2024-11-14 RX ORDER — DEXAMETHASONE SODIUM PHOSPHATE 4 MG/ML
INJECTION, SOLUTION INTRA-ARTICULAR; INTRALESIONAL; INTRAMUSCULAR; INTRAVENOUS; SOFT TISSUE
Status: DISCONTINUED | OUTPATIENT
Start: 2024-11-14 | End: 2024-11-14

## 2024-11-14 RX ORDER — ONDANSETRON 4 MG/1
8 TABLET, ORALLY DISINTEGRATING ORAL EVERY 8 HOURS PRN
Status: DISCONTINUED | OUTPATIENT
Start: 2024-11-14 | End: 2024-11-14 | Stop reason: HOSPADM

## 2024-11-14 RX ORDER — MEPERIDINE HYDROCHLORIDE 25 MG/ML
12.5 INJECTION INTRAMUSCULAR; INTRAVENOUS; SUBCUTANEOUS EVERY 10 MIN PRN
Status: DISCONTINUED | OUTPATIENT
Start: 2024-11-14 | End: 2024-11-14 | Stop reason: HOSPADM

## 2024-11-14 RX ORDER — BUPIVACAINE HYDROCHLORIDE 2.5 MG/ML
INJECTION, SOLUTION EPIDURAL; INFILTRATION; INTRACAUDAL
Status: DISCONTINUED | OUTPATIENT
Start: 2024-11-14 | End: 2024-11-14 | Stop reason: HOSPADM

## 2024-11-14 RX ORDER — ONDANSETRON HYDROCHLORIDE 2 MG/ML
INJECTION, SOLUTION INTRAVENOUS
Status: DISCONTINUED | OUTPATIENT
Start: 2024-11-14 | End: 2024-11-14

## 2024-11-14 RX ADMIN — MIDAZOLAM HYDROCHLORIDE 2 MG: 1 INJECTION INTRAMUSCULAR; INTRAVENOUS at 12:11

## 2024-11-14 RX ADMIN — LIDOCAINE HYDROCHLORIDE 100 MG: 20 INJECTION, SOLUTION INTRAVENOUS at 12:11

## 2024-11-14 RX ADMIN — HYDROMORPHONE HYDROCHLORIDE 0.2 MG: 2 INJECTION, SOLUTION INTRAMUSCULAR; INTRAVENOUS; SUBCUTANEOUS at 02:11

## 2024-11-14 RX ADMIN — ROCURONIUM BROMIDE 20 MG: 10 INJECTION, SOLUTION INTRAVENOUS at 01:11

## 2024-11-14 RX ADMIN — FENTANYL CITRATE 50 MCG: 50 INJECTION, SOLUTION INTRAMUSCULAR; INTRAVENOUS at 02:11

## 2024-11-14 RX ADMIN — SUGAMMADEX 200 MG: 100 INJECTION, SOLUTION INTRAVENOUS at 01:11

## 2024-11-14 RX ADMIN — ACETAMINOPHEN 1000 MG: 500 TABLET ORAL at 09:11

## 2024-11-14 RX ADMIN — FENTANYL CITRATE 50 MCG: 50 INJECTION, SOLUTION INTRAMUSCULAR; INTRAVENOUS at 01:11

## 2024-11-14 RX ADMIN — SCOPALAMINE 1 PATCH: 1 PATCH, EXTENDED RELEASE TRANSDERMAL at 12:11

## 2024-11-14 RX ADMIN — HYDROMORPHONE HYDROCHLORIDE 0.2 MG: 2 INJECTION, SOLUTION INTRAMUSCULAR; INTRAVENOUS; SUBCUTANEOUS at 03:11

## 2024-11-14 RX ADMIN — CEFAZOLIN 2 G: 2 INJECTION, POWDER, FOR SOLUTION INTRAMUSCULAR; INTRAVENOUS at 12:11

## 2024-11-14 RX ADMIN — PROPOFOL 160 MG: 10 INJECTION, EMULSION INTRAVENOUS at 12:11

## 2024-11-14 RX ADMIN — MUPIROCIN: 20 OINTMENT TOPICAL at 09:11

## 2024-11-14 RX ADMIN — FAMOTIDINE 20 MG: 20 TABLET ORAL at 09:11

## 2024-11-14 RX ADMIN — ONDANSETRON 4 MG: 2 INJECTION, SOLUTION INTRAMUSCULAR; INTRAVENOUS at 01:11

## 2024-11-14 RX ADMIN — SODIUM CHLORIDE, SODIUM LACTATE, POTASSIUM CHLORIDE, AND CALCIUM CHLORIDE: .6; .31; .03; .02 INJECTION, SOLUTION INTRAVENOUS at 12:11

## 2024-11-14 RX ADMIN — FENTANYL CITRATE 50 MCG: 50 INJECTION, SOLUTION INTRAMUSCULAR; INTRAVENOUS at 12:11

## 2024-11-14 RX ADMIN — DEXAMETHASONE SODIUM PHOSPHATE 4 MG: 4 INJECTION, SOLUTION INTRAMUSCULAR; INTRAVENOUS at 12:11

## 2024-11-14 RX ADMIN — HYDROMORPHONE HYDROCHLORIDE 2 MG: 2 TABLET ORAL at 04:11

## 2024-11-14 RX ADMIN — PHENYLEPHRINE HYDROCHLORIDE 100 MCG: 10 INJECTION INTRAVENOUS at 01:11

## 2024-11-14 RX ADMIN — IBUPROFEN 600 MG: 600 TABLET ORAL at 03:11

## 2024-11-14 RX ADMIN — ROCURONIUM BROMIDE 50 MG: 10 INJECTION, SOLUTION INTRAVENOUS at 12:11

## 2024-11-14 NOTE — BRIEF OP NOTE
[facility] - Surgery  OBGYN  Brief Operative Note    SUMMARY     Date of Procedure: 11/14/2024     Procedure: Procedure(s) (LRB):  HYSTERECTOMY,TOTAL,LAPAROSCOPIC,WITH SALPINGECTOMY (N/A)       Surgeons and Role:     * Nanette Timmons MD - Primary     * Yusef Guzman III, MD - Co-Surgeon    Assisting Surgeon: None    Pre-Operative Diagnosis: Abnormal uterine bleeding (AUB) [N93.9]  Dysmenorrhea [N94.6]    Post-Operative Diagnosis: Post-Op Diagnosis Codes:     * Abnormal uterine bleeding (AUB) [N93.9]     * Dysmenorrhea [N94.6]    Anesthesia: General    Description of the Findings of the Procedure:   - dense adhesion of uterus to anterior abdominal wall.  - small uterine fibroids.  - Normal fallopian tubes, and ovaries  - No suture material in the bladder  - strong jets of urine from both ureteral orifices were noted bilaterally     Complications: No    Estimated Blood Loss (EBL): 50 mL           Implants: * No implants in log *    Specimens:   Specimen (24h ago, onward)       Start     Ordered    11/14/24 1338  Specimen to Pathology, Surgery Gynecology and Obstetrics  Once        Comments: Pre-op Diagnosis: Abnormal uterine bleeding (AUB) [N93.9]Dysmenorrhea [N94.6]Procedure(s):HYSTERECTOMY,TOTAL,LAPAROSCOPIC,WITH SALPINGECTOMY Number of specimens: 1Name of specimens: uterus, cervix, bilateral fallopian tubes     References:    Click here for ordering Quick Tip   Question Answer Comment   Procedure Type: Gynecology and Obstetrics    Specimen Class: Routine/Screening    Release to patient Immediate        11/14/24 1337                            Condition: Good    Disposition: PACU - hemodynamically stable.    Attestation: I was present and scrubbed for the entire procedure.

## 2024-11-14 NOTE — ANESTHESIA PROCEDURE NOTES
Intubation    Date/Time: 11/14/2024 12:25 PM    Performed by: Amy Lott CRNA  Authorized by: Renny Palacios II, MD    Intubation:     Induction:  Intravenous    Intubated:  Postinduction    Mask Ventilation:  Easy mask    Attempts:  1    Attempted By:  Student    Method of Intubation:  Video laryngoscopy    Blade:  August 3    Laryngeal View Grade: Grade IIA - cords partially seen      Difficult Airway Encountered?: No      Complications:  None    Airway Device:  Oral endotracheal tube    Airway Device Size:  7.0    Style/Cuff Inflation:  Cuffed (inflated to minimal occlusive pressure)    Tube secured:  21    Secured at:  The lips    Placement Verified By:  Capnometry    Complicating Factors:  None    Findings Post-Intubation:  BS equal bilateral and atraumatic/condition of teeth unchanged

## 2024-11-14 NOTE — TRANSFER OF CARE
Anesthesia Transfer of Care Note    Patient: Umer Rodriugez    Procedure(s) Performed: Procedure(s) (LRB):  HYSTERECTOMY,TOTAL,LAPAROSCOPIC,WITH SALPINGECTOMY (N/A)    Patient location: PACU    Anesthesia Type: general    Transport from OR: Transported from OR on 6-10 L/min O2 by face mask with adequate spontaneous ventilation    Post pain: adequate analgesia    Post assessment: no apparent anesthetic complications    Post vital signs: stable    Level of consciousness: awake    Nausea/Vomiting: no nausea/vomiting    Complications: none    Transfer of care protocol was followed      Last vitals: Visit Vitals  BP (!) 109/58 (BP Location: Left arm, Patient Position: Sitting)   Pulse 81   Temp 36.4 °C (97.6 °F) (Temporal)   Resp 16   Wt 81.7 kg (180 lb 1.9 oz)   LMP 11/14/2024 (Approximate)   SpO2 99%   Breastfeeding No   BMI 32.94 kg/m²

## 2024-11-14 NOTE — OP NOTE
[facility]  OBGYN  Operative Note    SUMMARY     Date of Procedure: 11/14/2024     Procedure: Procedure(s) (LRB):  HYSTERECTOMY,TOTAL,LAPAROSCOPIC,WITH SALPINGECTOMY (N/A)     Taking down the dense adhesion to the anterior abdominal wall made this case exceedingly difficult and it took 50% longer than a typical hysterectomy.      Surgeons and Role:     * Nanette Timmons MD - Primary     * Yusef Guzman III, MD - Assistant        Pre-Operative Diagnosis: Abnormal uterine bleeding (AUB) [N93.9]  Dysmenorrhea [N94.6]    Post-Operative Diagnosis: Post-Op Diagnosis Codes:     * Abnormal uterine bleeding (AUB) [N93.9]     * Dysmenorrhea [N94.6]    Anesthesia: General    Technical Procedures Used:   The patient was taken to the operating room and placed first in the supine position. Calf high pneumatic compression boots were placed for DVT prophylaxis. She received 2 gms of Cefazolin IV as antibiotic prophylaxis. She then underwent uneventful general endotracheal anesthesia. She was placed in the semi-dorsolithotomy position using padded Jamie stirrups. She was then prepped and draped in the usual sterile fashion. A surgical time-out was performed.     A Eller catheter was placed in the usual fashion. The cervix was visualized and grasped with a tenaculum for retraction.  Using 0 Vicryl suture, 2 stay sutures were placed at 3 o'clock and 9 o'clock on the cervix.  The uterus was sounded to 10 cm.  The MARCELLE uterine manipulator with a KOH cup was then inserted without difficulty.    Gloves were changed and attention was turned to the abdomen.     All trocar sites were pretreated with 0.25% plain Marcaine. A 10 mm incision was made in the base of the umbilicus and grasped with towel clips and the Veress needle was passed through the base of the incision into the abdomen with an opening pressure of 5 mmHG. The abdomen was insufflated to a pressure of 15, the Veress needle was removed, and a 11 mm trocar was placed  under optical guidance. There was no evidence of injury below the level of insertion. The upper abdomen was inspected and was noted to be normal. The pelvis was inspected with the below findings. A 5 mm right lateral and 5 mm left lateral trocar, each lateral to their respective epigastric vessels in the lower quadrants were placed under direct visualization.     Both fallopian tubes were identified and were normal.  Both ovaries were normal.  Cul-de-sac was normal.  The uterus had a dense adhesion from the anterior aspect of the uterus to the anterior abdominal wall.  Taking down the dense adhesion to the anterior abdominal wall made this case exceedingly difficult and it took 50% longer than a typical hysterectomy.       The procedure was performed with a Ligasure and Harmonic Hook.    Attention was turned to the right-hand side.  Using the Ligasure, the right fallopian tube was  from the underlying ovary by transecting the mesosalpinx.   The right utero-ovarian ligament was cauterized and transected to the level of the round ligament.  The round ligament was cauterized and transected.      Attention was turned to the adhesion of the uterus to the anterior abdominal wall.  Using the Ligasure and the harmonic hook, this was carefully taken down. Once the dense part of the adhesion was taken down there was a filmy portion that was taken down bluntly.      The anterior and posterior leaves of the broad ligament were then dissected open. The bladder was dissected down below the level of the KOH cup anteriorly.     The uterine vessels were then skeletonized, cauterized, transected and lateralized.       The same procedures were repeated on the left hand side. The harmonic hook was then used to make an anterior colpotomy. Using the KOH cup as a guide, a circumferential incision was made around the cervico-uterine junction to free the specimen. The specimen was then delivered through the vagina and intact.        The vagina was then closed laparoscopically using the Endostitch and a V-lock suture in a running fashion.       Irrigation was performed and there was noted to be excellent hemostasis on low pressure.     A limited diagnostic cystoscopy was then performed. The Eller was removed and a 30 degree cystoscope was placed. 200 cc of sterile saline were instilled into the bladder. The dome of the bladder was intact-there was no suture material visible. Both ureteral orifices were visualized and strong jets of urine were noted bilaterally. The cystoscope was then removed and the bladder emptied of saline.      Gloves were changed and attention was then turned the abdomen to close the port incisions.  The abdomen was desufflated and ports were removed.  The fascia of the umbilical port incision was closed with 0-Vicryl suture.  The skin of the port incisions were closed with 4-0 Monocryl and Dermabond.    The patient tolerated the procedure well.      Sponge, lap, and needle counts were correct     The patient was taken to the recovery room in stable condition.        Description of the Findings of the Procedure:   - dense adhesion of uterus to anterior abdominal wall.  - this caused the case to take 50% longer than normal.  - small uterine fibroids.  - Normal fallopian tubes, and ovaries  - No suture material in the bladder  - strong jets of urine from both ureteral orifices were noted bilaterally     Complications: No    Estimated Blood Loss (EBL): 50 mL           Implants: * No implants in log *    Specimens:   Specimen (24h ago, onward)       Start     Ordered    11/14/24 2358  Specimen to Pathology, Surgery Gynecology and Obstetrics  Once        Comments: Pre-op Diagnosis: Abnormal uterine bleeding (AUB) [N93.9]Dysmenorrhea [N94.6]Procedure(s):HYSTERECTOMY,TOTAL,LAPAROSCOPIC,WITH SALPINGECTOMY Number of specimens: 1Name of specimens: uterus, cervix, bilateral fallopian tubes     References:    Click here for ordering  Quick Tip   Question Answer Comment   Procedure Type: Gynecology and Obstetrics    Specimen Class: Routine/Screening    Release to patient Immediate        11/14/24 7065                            Condition: Good    Disposition: PACU - hemodynamically stable.    Attestation: I was present and scrubbed for the entire procedure.

## 2024-11-15 NOTE — ANESTHESIA POSTPROCEDURE EVALUATION
Anesthesia Post Evaluation    Patient: Umer Rodriguez    Procedure(s) Performed: Procedure(s) (LRB):  HYSTERECTOMY,TOTAL,LAPAROSCOPIC,WITH SALPINGECTOMY (N/A)    Final Anesthesia Type: general      Patient location during evaluation: PACU  Patient participation: Yes- Able to Participate  Level of consciousness: awake and alert  Post-procedure vital signs: reviewed and stable  Pain management: adequate  Airway patency: patent    PONV status at discharge: No PONV  Anesthetic complications: no      Respiratory status: spontaneous ventilation and room air  Hydration status: euvolemic  Follow-up not needed.              Vitals Value Taken Time   /68 11/14/24 1753   Temp 36.6 °C (97.9 °F) 11/14/24 1753   Pulse 76 11/14/24 1753   Resp 16 11/14/24 1753   SpO2 96 % 11/14/24 1753         Event Time   Out of Recovery 17:48:00         Pain/Sissy Score: Pain Rating Prior to Med Admin: 6 (11/14/2024  4:57 PM)  Pain Rating Post Med Admin: 4 (11/14/2024  5:42 PM)  Sissy Score: 10 (11/14/2024  5:50 PM)

## 2024-11-18 NOTE — DISCHARGE SUMMARY
"[facility]  Obstetrics & Gynecology  Discharge Summary    Patient Name: Umer Rodriguez  MRN: [id number]  Admission Date: 11/14/2024  Hospital Length of Stay: 0 days  Discharge Date and Time: 11/14/2024  6:46 PM  Attending Physician: No att. providers found   Discharging Provider: Nanette Timmons MD  Primary Care Provider: Nidia Traore MD    HPI: Patient admitted for scheduled total laparoscopic hysterectomy with bilateral salpingectomy.      Hospital Course: Routine post operative course.     Procedure(s) (LRB):  HYSTERECTOMY,TOTAL,LAPAROSCOPIC,WITH SALPINGECTOMY (N/A)     Consults:     Significant Diagnostic Studies: Labs: CBC No results for input(s): "WBC", "HGB", "HCT", "PLT" in the last 48 hours.    Pending Diagnostic Studies:       Procedure Component Value Units Date/Time    Specimen to Pathology, Surgery Gynecology and Obstetrics [[id number]] Collected: 11/14/24 1338    Order Status: Sent Lab Status: In process Updated: 11/15/24 0807    Specimen: Tissue           Final Active Diagnoses:    Diagnosis Date Noted POA    S/P laparoscopic hysterectomy [Z90.710] 11/14/2024 Yes    Abnormal uterine bleeding (AUB) [N93.9] 11/14/2024 Yes    Dysmenorrhea [N94.6] 11/14/2024 Yes      Problems Resolved During this Admission:        Discharged Condition: good    Disposition: Home or Self Care    Follow Up:   Follow-up Information       Nanette Timmons MD. Go in 2 week(s).    Specialty: Obstetrics and Gynecology  Why: For wound re-check  Contact information:  [address]  [address] [place] [id number] [phone]                           Patient Instructions:      Diet general     Pelvic Rest     Lifting restrictions     No dressing needed     Call MD for:  temperature >100.4     Call MD for:  persistent nausea and vomiting     Call MD for:  severe uncontrolled pain     Call MD for:  difficulty breathing, headache or visual disturbances     Call MD for:  redness, tenderness, or signs of infection (pain, " swelling, redness, odor or green/yellow discharge around incision site)     Call MD for:  hives     Call MD for:  persistent dizziness or light-headedness     Call MD for:  extreme fatigue     Medications:  Reconciled Home Medications:      Medication List        START taking these medications      acetaminophen 500 MG tablet  Commonly known as: TYLENOL EXTRA STRENGTH  Take 2 tablets (1,000 mg total) by mouth every 6 (six) hours as needed for Pain.     docusate sodium 100 MG capsule  Commonly known as: COLACE  Take 1 capsule (100 mg total) by mouth 2 (two) times daily.     HYDROmorphone 2 MG tablet  Commonly known as: DILAUDID  Take 1 tablet (2 mg total) by mouth every 4 (four) hours as needed for Pain.            CHANGE how you take these medications      ibuprofen 800 MG tablet  Commonly known as: ADVIL,MOTRIN  Take 1 tablet (800 mg total) by mouth 2 (two) times daily as needed for Pain.  What changed: reasons to take this            CONTINUE taking these medications      cetirizine 10 MG tablet  Commonly known as: ZYRTEC  Take 1 tablet (10 mg total) by mouth once daily.     drospirenone-ethinyl estradioL 3-0.02 mg per tablet  Commonly known as: PHU  Take 1 tablet by mouth once daily.     ergocalciferol 50,000 unit Cap  Commonly known as: ERGOCALCIFEROL  Take 1 capsule (50,000 Units total) by mouth every 7 days.     ferrous sulfate 325 mg (65 mg iron) Tab tablet  Commonly known as: FeroSuL  Take 1 tablet (325 mg total) by mouth once daily.     fluticasone propionate 50 mcg/actuation nasal spray  Commonly known as: FLONASE  SHAKE LIQUID AND USE 1 SPRAY(50 MCG) IN EACH NOSTRIL DAILY              Nanette Timmons MD  Obstetrics & Gynecology  [facility]

## 2024-11-20 ENCOUNTER — PATIENT MESSAGE (OUTPATIENT)
Dept: OBSTETRICS AND GYNECOLOGY | Facility: CLINIC | Age: 44
End: 2024-11-20
Payer: COMMERCIAL

## 2024-11-20 LAB
FINAL PATHOLOGIC DIAGNOSIS: NORMAL
GROSS: NORMAL
Lab: NORMAL

## 2024-11-29 ENCOUNTER — OFFICE VISIT (OUTPATIENT)
Dept: OBSTETRICS AND GYNECOLOGY | Facility: CLINIC | Age: 44
End: 2024-11-29
Payer: COMMERCIAL

## 2024-11-29 VITALS — DIASTOLIC BLOOD PRESSURE: 70 MMHG | SYSTOLIC BLOOD PRESSURE: 130 MMHG | BODY MASS INDEX: 34.11 KG/M2 | WEIGHT: 186.5 LBS

## 2024-11-29 DIAGNOSIS — Z90.710 S/P LAPAROSCOPIC HYSTERECTOMY: Primary | ICD-10-CM

## 2024-11-29 PROCEDURE — 99999 PR PBB SHADOW E&M-EST. PATIENT-LVL III: CPT | Mod: PBBFAC,,, | Performed by: OBSTETRICS & GYNECOLOGY

## 2024-11-29 NOTE — PROGRESS NOTES
Subjective     Patient ID: Umer Rodriguez is a 44 y.o. female.    Chief Complaint:  Post-op Evaluation      History of Present Illness  HPI  Postoperative Follow-up  Patient presents to the clinic 2 weeks status post total laparoscopic hysterectomy for abnormal uterine bleeding, fibroids, and pelvic pain. Eating a regular diet without difficulty. Bowel movements are normal. The patient is not having any pain.    GYN & OB History  Patient's last menstrual period was 10/21/2024 (approximate).   Date of Last Pap: 2024    OB History    Para Term  AB Living   3 3 2 1   2   SAB IAB Ectopic Multiple Live Births           2      # Outcome Date GA Lbr Diogo/2nd Weight Sex Type Anes PTL Lv   3  13   2.495 kg (5 lb 8 oz) M CS-Unspec   VALERIE   2 Term 12/08/10 40w0d  3.856 kg (8 lb 8 oz) F CS-LTranv   VALERIE   1 Term                Review of Systems  Review of Systems   Constitutional: Negative.    HENT: Negative.     Eyes: Negative.    Respiratory: Negative.     Cardiovascular: Negative.    Gastrointestinal: Negative.    Endocrine: Negative.    Genitourinary: Negative.    Musculoskeletal: Negative.    Integumentary:  Negative.   Neurological: Negative.    Hematological: Negative.    Psychiatric/Behavioral: Negative.     Breast: negative.           Objective   Physical Exam:   Constitutional: She is oriented to person, place, and time. She appears well-developed.    HENT:   Head: Normocephalic and atraumatic.    Eyes: EOM are normal.     Cardiovascular:  Normal rate.             Pulmonary/Chest: Effort normal.        Abdominal: Soft. There is no abdominal tenderness.             Musculoskeletal: Normal range of motion.       Neurological: She is oriented to person, place, and time.    Skin: Skin is warm.    Psychiatric: She has a normal mood and affect.            Assessment and Plan     1. S/P laparoscopic hysterectomy             Plan:  - Continue pelvic rest.  - follow up in 4 wks.

## 2024-12-05 ENCOUNTER — PATIENT MESSAGE (OUTPATIENT)
Dept: OBSTETRICS AND GYNECOLOGY | Facility: CLINIC | Age: 44
End: 2024-12-05
Payer: COMMERCIAL

## 2024-12-10 ENCOUNTER — PATIENT MESSAGE (OUTPATIENT)
Dept: FAMILY MEDICINE | Facility: CLINIC | Age: 44
End: 2024-12-10
Payer: COMMERCIAL

## 2024-12-10 ENCOUNTER — PATIENT MESSAGE (OUTPATIENT)
Dept: PLASTIC SURGERY | Facility: CLINIC | Age: 44
End: 2024-12-10
Payer: COMMERCIAL

## 2024-12-10 DIAGNOSIS — E55.9 VITAMIN D DEFICIENCY: ICD-10-CM

## 2024-12-12 NOTE — TELEPHONE ENCOUNTER
No care due was identified.  [facility] Embedded Care Due Messages. Reference number: [id number].   12/12/2024 8:49:39 AM CST

## 2024-12-13 RX ORDER — ERGOCALCIFEROL 1.25 MG/1
50000 CAPSULE ORAL
Qty: 12 CAPSULE | Refills: 0 | Status: SHIPPED | OUTPATIENT
Start: 2024-12-13

## 2024-12-16 NOTE — TELEPHONE ENCOUNTER
\  Informed pt that we had not received any paperwork from  [employer] gave her fax number to have forms sent to

## 2024-12-20 ENCOUNTER — PATIENT MESSAGE (OUTPATIENT)
Dept: OBSTETRICS AND GYNECOLOGY | Facility: CLINIC | Age: 44
End: 2024-12-20
Payer: COMMERCIAL

## 2024-12-26 ENCOUNTER — OFFICE VISIT (OUTPATIENT)
Dept: OBSTETRICS AND GYNECOLOGY | Facility: CLINIC | Age: 44
End: 2024-12-26
Payer: COMMERCIAL

## 2024-12-26 VITALS — WEIGHT: 186.5 LBS | SYSTOLIC BLOOD PRESSURE: 120 MMHG | DIASTOLIC BLOOD PRESSURE: 70 MMHG | BODY MASS INDEX: 34.11 KG/M2

## 2024-12-26 DIAGNOSIS — Z90.710 S/P LAPAROSCOPIC HYSTERECTOMY: Primary | ICD-10-CM

## 2024-12-26 PROCEDURE — 99024 POSTOP FOLLOW-UP VISIT: CPT | Mod: S$GLB,,, | Performed by: OBSTETRICS & GYNECOLOGY

## 2024-12-26 PROCEDURE — 1159F MED LIST DOCD IN RCRD: CPT | Mod: CPTII,S$GLB,, | Performed by: OBSTETRICS & GYNECOLOGY

## 2024-12-26 PROCEDURE — 3078F DIAST BP <80 MM HG: CPT | Mod: CPTII,S$GLB,, | Performed by: OBSTETRICS & GYNECOLOGY

## 2024-12-26 PROCEDURE — 3044F HG A1C LEVEL LT 7.0%: CPT | Mod: CPTII,S$GLB,, | Performed by: OBSTETRICS & GYNECOLOGY

## 2024-12-26 PROCEDURE — 3074F SYST BP LT 130 MM HG: CPT | Mod: CPTII,S$GLB,, | Performed by: OBSTETRICS & GYNECOLOGY

## 2024-12-26 PROCEDURE — 99999 PR PBB SHADOW E&M-EST. PATIENT-LVL III: CPT | Mod: PBBFAC,,, | Performed by: OBSTETRICS & GYNECOLOGY

## 2024-12-26 NOTE — PROGRESS NOTES
Subjective     Patient ID: Umer Rodriguez is a 44 y.o. female.    Chief Complaint:  No chief complaint on file.      History of Present Illness  HPI  Postoperative Follow-up  Patient presents to the clinic 6 weeks status post total laparoscopic hysterectomy for abnormal uterine bleeding, fibroids, and pelvic pain. Eating a regular diet without difficulty. Bowel movements are normal. The patient is not having any pain.    GYN & OB History  Patient's last menstrual period was 10/21/2024 (approximate).   Date of Last Pap: 2024    OB History    Para Term  AB Living   3 3 2 1   2   SAB IAB Ectopic Multiple Live Births           2      # Outcome Date GA Lbr Diogo/2nd Weight Sex Type Anes PTL Lv   3  13   2.495 kg (5 lb 8 oz) M CS-Unspec   VALERIE   2 Term 12/08/10 40w0d  3.856 kg (8 lb 8 oz) F CS-LTranv   VALERIE   1 Term                Review of Systems  Review of Systems   Constitutional: Negative.    HENT: Negative.     Eyes: Negative.    Respiratory: Negative.     Cardiovascular: Negative.    Gastrointestinal: Negative.    Endocrine: Negative.    Genitourinary: Negative.    Musculoskeletal: Negative.    Integumentary:  Negative.   Neurological: Negative.    Hematological: Negative.    Psychiatric/Behavioral: Negative.     Breast: negative.           Objective   Physical Exam:   Constitutional: She is oriented to person, place, and time. She appears well-developed.    HENT:   Head: Normocephalic and atraumatic.    Eyes: EOM are normal.     Cardiovascular:  Normal rate.             Pulmonary/Chest: Effort normal.        Abdominal: Soft. There is no abdominal tenderness.     Genitourinary:    Vagina, right adnexa and left adnexa normal.      Pelvic exam was performed with patient supine.   The external female genitalia was normal.   No external genitalia lesions identified,Genitalia hair distrobution normal .     Labial bartholins normal.There is no rash, tenderness, lesion or injury on the  right labia. There is no rash, tenderness, lesion or injury on the left labia. Right adnexum displays no tenderness and no fullness. Left adnexum displays no tenderness and no fullness. No erythema, vaginal discharge, bleeding or prolapse of vaginal walls in the vagina. Vagina was moist.Cervix is absent.Uterus is absent. Normal urethral meatus.Urethral Meatus exhibits: no urethral lesionUrethra findings: no urethral mass, no tenderness and no prolapsedBladder findings: no bladder distention and no bladder tenderness          Musculoskeletal: Normal range of motion.       Neurological: She is oriented to person, place, and time.    Skin: Skin is warm.    Psychiatric: She has a normal mood and affect.            Assessment and Plan     1. S/P laparoscopic hysterectomy             Plan:  - well recovered.    - follow up prn or for annual exam.

## 2025-01-08 ENCOUNTER — PATIENT MESSAGE (OUTPATIENT)
Dept: PLASTIC SURGERY | Facility: CLINIC | Age: 45
End: 2025-01-08
Payer: COMMERCIAL

## 2025-02-08 ENCOUNTER — PATIENT MESSAGE (OUTPATIENT)
Dept: OBSTETRICS AND GYNECOLOGY | Facility: CLINIC | Age: 45
End: 2025-02-08
Payer: COMMERCIAL

## 2025-02-12 ENCOUNTER — OFFICE VISIT (OUTPATIENT)
Dept: PLASTIC SURGERY | Facility: CLINIC | Age: 45
End: 2025-02-12
Payer: COMMERCIAL

## 2025-02-12 VITALS — HEART RATE: 79 BPM | DIASTOLIC BLOOD PRESSURE: 78 MMHG | SYSTOLIC BLOOD PRESSURE: 115 MMHG

## 2025-02-12 DIAGNOSIS — N62 HYPERTROPHY OF BREAST: Primary | ICD-10-CM

## 2025-02-12 NOTE — PROGRESS NOTES
Subjective:      Umer Rodriguez is a 44 y.o. year old female who presents to the [facility] Surgery [facility] on 02/12/2025 for consultation regarding the addition of an abdominoplasty with liposuction to the abdomen flanks to her already approved bilateral breast reduction surgery.  The patient understands that this procedure will not be covered by insurance.  Discussed the risks benefits and outcomes associated with the abdominoplasty with liposuction.  The patient is a good candidate for this procedure. Denies fever, chills, nausea, vomiting, or other systemic signs of infection.    Vitals:    02/12/25 0856   BP: 115/78   Pulse: 79        Review of patient's allergies indicates:   Allergen Reactions    Oxycodone Anaphylaxis       Current Outpatient Medications on File Prior to Visit   Medication Sig Dispense Refill    acetaminophen (TYLENOL EXTRA STRENGTH) 500 MG tablet Take 2 tablets (1,000 mg total) by mouth every 6 (six) hours as needed for Pain. 100 tablet 2    cetirizine (ZYRTEC) 10 MG tablet Take 1 tablet (10 mg total) by mouth once daily. 90 tablet 3    docusate sodium (COLACE) 100 MG capsule Take 1 capsule (100 mg total) by mouth 2 (two) times daily. 60 capsule 1    drospirenone-ethinyl estradioL (PHU) 3-0.02 mg per tablet Take 1 tablet by mouth once daily. 84 tablet 4    ergocalciferol (ERGOCALCIFEROL) 50,000 unit Cap Take 1 capsule (50,000 Units total) by mouth every 7 days. 12 capsule 0    ferrous sulfate (FEROSUL) 325 mg (65 mg iron) Tab tablet Take 1 tablet (325 mg total) by mouth once daily. 90 tablet 0    fluticasone propionate (FLONASE) 50 mcg/actuation nasal spray SHAKE LIQUID AND USE 1 SPRAY(50 MCG) IN EACH NOSTRIL DAILY 32 g 2    HYDROmorphone (DILAUDID) 2 MG tablet Take 1 tablet (2 mg total) by mouth every 4 (four) hours as needed for Pain. 20 tablet 0    ibuprofen (ADVIL,MOTRIN) 800 MG tablet Take 1 tablet (800 mg total) by mouth 2 (two) times daily as needed for Pain. 60 tablet 2     neomycin-polymyxin-dexamethasone (MAXITROL) 3.5mg/mL-10,000 unit/mL-0.1 % DrpS Place into both eyes.       No current facility-administered medications on file prior to visit.       Patient Active Problem List   Diagnosis    ARNOL (iron deficiency anemia)    Spina bifida of lumbosacral region without hydrocephalus    Tethered spinal cord    Detrusor instability    Macromastia    Seasonal allergic rhinitis due to pollen    Vitamin D deficiency    Menorrhagia with regular cycle    S/P laparoscopic hysterectomy    Abnormal uterine bleeding (AUB)    Dysmenorrhea       Past Surgical History:   Procedure Laterality Date     SECTION  2013    COLONOSCOPY N/A 10/23/2024    Procedure: COLONOSCOPY;  Surgeon: Keila Seo MD;  Location: [facility] ENDO;  Service: Endoscopy;  Laterality: N/A;  Referral:  Svitlana Becker MD / suprep / yuly portal - LW  10/14/24- case adj. 15 min, lvm/portal for pc. DBM  10/17/24- pc complete. DBM  10/18/24- r/s, instr to portal. [facility]  10/22 pt confirmed SW    CYSTOSCOPY N/A 2019    Procedure: CYSTOSCOPY;  Surgeon: Pollo Park MD;  Location: [facility] OR [address] [place] [address];  Service: Urology;  Laterality: N/A;  30 min/2nd floor OR    FOOT SURGERY Right     HYSTERECTOMY, TOTAL, LAPAROSCOPIC, WITH SALPINGECTOMY N/A 2024    Procedure: HYSTERECTOMY,TOTAL,LAPAROSCOPIC,WITH SALPINGECTOMY;  Surgeon: Nanette Timmons MD;  Location: [facility] OR;  Service: OB/GYN;  Laterality: N/A;  RN PREOP 2024   BLOOD REFUSAL SIGNED----UPT ON ARRIVAL    INJECTION OF BOTULINUM TOXIN TYPE A N/A 2019    Procedure: INJECTION, BOTULINUM TOXIN, TYPE A;  Surgeon: Pollo Park MD;  Location: [facility] OR [address] [place] [address];  Service: Urology;  Laterality: N/A;       Social History     Socioeconomic History    Marital status: Single    Number of children: 2   Tobacco Use    Smoking status: Never     Passive exposure: Never    Smokeless tobacco: Never   Substance and Sexual Activity    Alcohol use: Not Currently     Comment:  occasional    Drug use: No    Sexual activity: Not Currently     Partners: Male     Birth control/protection: Abstinence, None     Social Drivers of Health     Financial Resource Strain: Low Risk  (1/1/2025)    Overall Financial Resource Strain (CARDIA)     Difficulty of Paying Living Expenses: Not hard at all   Food Insecurity: No Food Insecurity (1/1/2025)    Hunger Vital Sign     Worried About Running Out of Food in the Last Year: Never true     Ran Out of Food in the Last Year: Never true   Transportation Needs: Patient Declined (12/15/2023)    PRAPARE - Transportation     Lack of Transportation (Medical): Patient declined     Lack of Transportation (Non-Medical): Patient declined   Physical Activity: Unknown (1/1/2025)    Exercise Vital Sign     Days of Exercise per Week: 3 days   Stress: No Stress Concern Present (1/1/2025)    [employer] - Occupational Stress Questionnaire     Feeling of Stress : Not at all   Housing Stability: Unknown (1/1/2025)    Housing Stability Vital Sign     Unable to Pay for Housing in the Last Year: No           Review of Systems:   Constitutional: Denies fever/chills  Eyes: Denies change in vision  ENT: Denies sore throat or rhinorrhea   Respiratory: Denies shortness of breath or cough  Cardiovascular: Denies chest pain or palpitations  Gastrointestinal: Denies abdominal pain, nausea, or vomiting  Genitourinary: Denies dysuria and flank pain. Skin: Denies new rash or skin lesions.   Allergic/Immunologic: Denies adverse reactions to current medications  Neurological: Denies headaches or dizziness  Musculoskeletal: Denies arthralgias.     Objective:     Physical Exam:  Vitals:    02/12/25 0856   BP: 115/78   Pulse: 79       WD WN NAD  VSS  Normal resp effort  Excess abdominal skin of the abdomen and flanks        Assessment:       No diagnosis found.    Plan:   44 y.o. female with excess abdominal skin  - Patient was seen and evaluated by myself and   Hermes Lugo    - plan is for a full abdominoplasty with liposuction of the abdomen and flanks in addition to the patient's already approved bilateral breast reduction.  - Risks, benefits and alternatives to surgery were discussed.  We will plan to quote the patient  - Office staff to coordinate surgery date once insurance authorization obtained      All questions were answered. The patient was advised to call the clinic with any questions or concerns prior to their next visit.           Lex Redd MD- Fellow  Plastic and Reconstruction Surgery Department  [phone] office

## 2025-02-13 ENCOUNTER — OFFICE VISIT (OUTPATIENT)
Dept: OBSTETRICS AND GYNECOLOGY | Facility: CLINIC | Age: 45
End: 2025-02-13
Payer: COMMERCIAL

## 2025-02-13 VITALS
DIASTOLIC BLOOD PRESSURE: 80 MMHG | SYSTOLIC BLOOD PRESSURE: 120 MMHG | WEIGHT: 186.94 LBS | BODY MASS INDEX: 34.19 KG/M2

## 2025-02-13 DIAGNOSIS — R10.2 PELVIC PAIN: Primary | ICD-10-CM

## 2025-02-13 PROCEDURE — 3074F SYST BP LT 130 MM HG: CPT | Mod: CPTII,S$GLB,, | Performed by: OBSTETRICS & GYNECOLOGY

## 2025-02-13 PROCEDURE — 3008F BODY MASS INDEX DOCD: CPT | Mod: CPTII,S$GLB,, | Performed by: OBSTETRICS & GYNECOLOGY

## 2025-02-13 PROCEDURE — 3079F DIAST BP 80-89 MM HG: CPT | Mod: CPTII,S$GLB,, | Performed by: OBSTETRICS & GYNECOLOGY

## 2025-02-13 PROCEDURE — 87086 URINE CULTURE/COLONY COUNT: CPT | Performed by: OBSTETRICS & GYNECOLOGY

## 2025-02-13 PROCEDURE — 1159F MED LIST DOCD IN RCRD: CPT | Mod: CPTII,S$GLB,, | Performed by: OBSTETRICS & GYNECOLOGY

## 2025-02-13 PROCEDURE — 99999 PR PBB SHADOW E&M-EST. PATIENT-LVL III: CPT | Mod: PBBFAC,,, | Performed by: OBSTETRICS & GYNECOLOGY

## 2025-02-13 PROCEDURE — 87186 SC STD MICRODIL/AGAR DIL: CPT | Performed by: OBSTETRICS & GYNECOLOGY

## 2025-02-13 PROCEDURE — 87088 URINE BACTERIA CULTURE: CPT | Performed by: OBSTETRICS & GYNECOLOGY

## 2025-02-13 PROCEDURE — 99213 OFFICE O/P EST LOW 20 MIN: CPT | Mod: S$GLB,,, | Performed by: OBSTETRICS & GYNECOLOGY

## 2025-02-13 NOTE — PROGRESS NOTES
Subjective     Patient ID: Umer Rodriguez is a 44 y.o. female.    Chief Complaint:  PELVIC PRESSURE      History of Present Illness  HPI  She is s/p TLH on 2024.  Starting approximately 2 wks ago, she began having pelvic pain her her left side.  Sometimes has pain when she urinates.    GYN & OB History  Patient's last menstrual period was 10/21/2024 (approximate).   Date of Last Pap: 2024    OB History    Para Term  AB Living   3 3 2 1   2   SAB IAB Ectopic Multiple Live Births           2      # Outcome Date GA Lbr Diogo/2nd Weight Sex Type Anes PTL Lv   3  13   2.495 kg (5 lb 8 oz) M CS-Unspec   VALERIE   2 Term 12/08/10 40w0d  3.856 kg (8 lb 8 oz) F CS-LTranv   VALERIE   1 Term                Review of Systems  Review of Systems   Constitutional: Negative.    HENT: Negative.     Eyes: Negative.    Respiratory: Negative.     Cardiovascular: Negative.    Gastrointestinal: Negative.    Endocrine: Negative.    Genitourinary: Negative.    Musculoskeletal: Negative.    Integumentary:  Negative.   Neurological: Negative.    Hematological: Negative.    Psychiatric/Behavioral: Negative.     Breast: negative.           Objective   Physical Exam:   Constitutional: She is oriented to person, place, and time. She appears well-developed.    HENT:   Head: Normocephalic and atraumatic.    Eyes: EOM are normal.     Cardiovascular:  Normal rate.             Pulmonary/Chest: Effort normal.        Abdominal: Soft. There is no abdominal tenderness.             Musculoskeletal: Normal range of motion.       Neurological: She is oriented to person, place, and time.    Skin: Skin is warm.    Psychiatric: She has a normal mood and affect.            Assessment and Plan     1. Pelvic pain             Plan:  1. Pelvic pain (Primary)  - Pelvic ultrasound ordered.  Will call patient with results.  - Patient instructed to call office if she has not heard anything 2 wks after ultrasound.  - US Pelvis Comp  with Transvag NON-OB (xpd; Future  - Urine Culture High Risk

## 2025-02-15 LAB — BACTERIA UR CULT: ABNORMAL

## 2025-02-17 ENCOUNTER — PATIENT MESSAGE (OUTPATIENT)
Dept: OBSTETRICS AND GYNECOLOGY | Facility: CLINIC | Age: 45
End: 2025-02-17
Payer: COMMERCIAL

## 2025-02-17 DIAGNOSIS — N30.00 ACUTE CYSTITIS WITHOUT HEMATURIA: Primary | ICD-10-CM

## 2025-02-17 RX ORDER — CEPHALEXIN 500 MG/1
500 CAPSULE ORAL EVERY 12 HOURS
Qty: 20 CAPSULE | Refills: 0 | Status: SHIPPED | OUTPATIENT
Start: 2025-02-17 | End: 2025-02-27

## 2025-02-18 ENCOUNTER — HOSPITAL ENCOUNTER (OUTPATIENT)
Dept: RADIOLOGY | Facility: OTHER | Age: 45
Discharge: HOME | End: 2025-02-18
Attending: OBSTETRICS & GYNECOLOGY
Payer: COMMERCIAL

## 2025-02-18 DIAGNOSIS — R10.2 PELVIC PAIN: ICD-10-CM

## 2025-02-18 PROCEDURE — 76856 US EXAM PELVIC COMPLETE: CPT | Mod: TC

## 2025-02-20 ENCOUNTER — PATIENT MESSAGE (OUTPATIENT)
Dept: PLASTIC SURGERY | Facility: CLINIC | Age: 45
End: 2025-02-20
Payer: COMMERCIAL

## 2025-02-25 ENCOUNTER — TELEPHONE (OUTPATIENT)
Dept: PLASTIC SURGERY | Facility: HOSPITAL | Age: 45
End: 2025-02-25
Payer: COMMERCIAL

## 2025-02-25 DIAGNOSIS — L30.4 ERYTHEMA INTERTRIGO: ICD-10-CM

## 2025-02-25 DIAGNOSIS — N62 MACROMASTIA: ICD-10-CM

## 2025-02-25 DIAGNOSIS — M25.512 CHRONIC PAIN IN LEFT SHOULDER: ICD-10-CM

## 2025-02-25 DIAGNOSIS — G89.29 CHRONIC PAIN IN LEFT SHOULDER: ICD-10-CM

## 2025-02-25 DIAGNOSIS — M25.511 CHRONIC PAIN IN RIGHT SHOULDER: ICD-10-CM

## 2025-02-25 DIAGNOSIS — N62 HYPERTROPHY OF BREAST: Primary | ICD-10-CM

## 2025-02-25 DIAGNOSIS — M54.2 CERVICALGIA: ICD-10-CM

## 2025-02-25 DIAGNOSIS — G89.29 CHRONIC PAIN IN RIGHT SHOULDER: ICD-10-CM

## 2025-02-25 NOTE — TELEPHONE ENCOUNTER
Sx booked for 5/27. Post op appt made. Pt would like to proceed with full abdominoplasty w/ lipo. Financial office notified. All questions answered.

## 2025-02-26 ENCOUNTER — PATIENT MESSAGE (OUTPATIENT)
Dept: PLASTIC SURGERY | Facility: CLINIC | Age: 45
End: 2025-02-26
Payer: COMMERCIAL

## 2025-02-27 ENCOUNTER — PATIENT MESSAGE (OUTPATIENT)
Dept: OBSTETRICS AND GYNECOLOGY | Facility: CLINIC | Age: 45
End: 2025-02-27
Payer: COMMERCIAL

## 2025-02-27 RX ORDER — ERGOCALCIFEROL 1.25 MG/1
50000 CAPSULE ORAL
COMMUNITY
Start: 2025-02-21 | End: 2025-04-17 | Stop reason: SDUPTHER

## 2025-02-27 NOTE — ANESTHESIA PAT ROS NOTE
02/27/2025  Umer Rodriguez is a 44 y.o., female.      Pre-op Assessment          Review of Systems           Anesthesia Assessment: Preoperative EQUATION    Planned Procedure: Procedure(s) (LRB):  MAMMOPLASTY, REDUCTION, BILATERAL (Bilateral)  ABDOMINOPLASTY (N/A)  LIPOSUCTION, ABDOMEN (N/A)  Requested Anesthesia Type:General  Surgeon: Hermes Lugo MD  Service: Plastics  Known or anticipated Date of Surgery:5/27/2025    Surgeon notes: reviewed    Electronic QUestionnaire Assessment completed via nurse interview with patient.        Triage considerations:         Previous anesthesia records:GETA and No problems  11/14/24 HYSTERECTOMY, TOTAL, LAPAROSCOPIC, WITH SALPINGECTOMY (Uterus), gen anes, ASA 1  Intubation     Date/Time: 11/14/2024 12:25 PM     Performed by: Amy Lott CRNA  Authorized by: Renny Palacios II, MD    Intubation:     Induction:  Intravenous    Intubated:  Postinduction    Mask Ventilation:  Easy mask    Attempts:  1    Attempted By:  Student    Method of Intubation:  Video laryngoscopy    Blade:  August 3    Laryngeal View Grade: Grade IIA - cords partially seen      Difficult Airway Encountered?: No      Complications:  None    Airway Device:  Oral endotracheal tube    Airway Device Size:  7.0    Style/Cuff Inflation:  Cuffed (inflated to minimal occlusive pressure)    Tube secured:  21    Secured at:  The lips    Placement Verified By:  Capnometry    Complicating Factors:  None    Findings Post-Intubation:  BS equal bilateral and atraumatic/condition of teeth unchanged    Last PCP note: 6-12 months ago , within [facility]   Subspecialty notes:  Plastics    Other important co-morbidities: Obesity and symptomatic hypertrophy of breasts (sx incl chronic neck/back/shoulders pain, deep shoulder grooving from bra straps, macerating rashes beneath breasts), spina bifida of  lumbosacral region w/o hydrocephalus, tethered spinal cord, lumbar radiculopathy, ARNOL, h/o TLH w/ salpingectomy for fibroids/ AUB/ pelvic pain, detrusor instability, chronic AR, nasal congestion, chronic snoring and mouth breathing, preDM, short toes, toe contracture, h/o right foot surgery for hammer toe/bunion, RLS, h/o cysto w/ botox injection, h/o        Tests already available:  No recent tests.             Instructions     Optimization:  Anesthesia Preop Clinic Assessment Indicated    Medical Opinion Indicated       Sub-specialist consult indicated: TBD       Plan:    Testing:  BMP and Hematology Profile   Pre-anesthesia  visit       Visit focus: concerns in complex and/or prolonged anesthesia, post-operative pain control for chronic pain patient, 6 hour surgery, obesity, chronic snoring/nasal congestion/ mouth breathing, pcp appt outside clearance window     Consultation:IM Perioperative Hospitalist    Navigation: Tests Scheduled.              Consults scheduled.             Results will be tracked by Preop Clinic.

## 2025-03-20 ENCOUNTER — PATIENT MESSAGE (OUTPATIENT)
Dept: OBSTETRICS AND GYNECOLOGY | Facility: CLINIC | Age: 45
End: 2025-03-20
Payer: COMMERCIAL

## 2025-04-04 ENCOUNTER — PATIENT MESSAGE (OUTPATIENT)
Dept: FAMILY MEDICINE | Facility: CLINIC | Age: 45
End: 2025-04-04

## 2025-04-04 ENCOUNTER — OFFICE VISIT (OUTPATIENT)
Dept: FAMILY MEDICINE | Facility: CLINIC | Age: 45
End: 2025-04-04
Payer: COMMERCIAL

## 2025-04-04 DIAGNOSIS — J32.9 SINUSITIS, UNSPECIFIED CHRONICITY, UNSPECIFIED LOCATION: Primary | ICD-10-CM

## 2025-04-04 RX ORDER — METHYLPREDNISOLONE 4 MG/1
TABLET ORAL
Qty: 1 EACH | Refills: 0 | Status: SHIPPED | OUTPATIENT
Start: 2025-04-04 | End: 2025-04-25

## 2025-04-04 RX ORDER — NEOMYCIN SULFATE, POLYMYXIN B SULFATE AND DEXAMETHASONE 3.5; 10000; 1 MG/ML; [USP'U]/ML; MG/ML
SUSPENSION/ DROPS OPHTHALMIC
COMMUNITY
Start: 2025-03-27

## 2025-04-04 RX ORDER — GUAIFENESIN 1200 MG
TABLET, EXTENDED RELEASE 12 HR ORAL
COMMUNITY
Start: 2025-03-06

## 2025-04-04 NOTE — PROGRESS NOTES
The patient location is: [place]  The chief complaint leading to consultation is: Sore Throat, Cough, Eye Drainage, Conjunctivitis, Rash, Nasal Congestion, and Generalized Body Aches (OTC meds not working)    Total time: 30 minutes  Visit type: audiovisual    Each patient to whom he or she provides medical services by telemedicine is:  (1) informed of the relationship between the physician and patient and the respective role of any other health care provider with respect to management of the patient; and (2) notified that he or she may decline to receive medical services by telemedicine and may withdraw from such care at any time.      Subjective:       Patient ID: Umer Rodriguez is a 44 y.o. female.    Chief Complaint: Sore Throat, Cough, Eye Drainage, Conjunctivitis, Rash, Nasal Congestion, and Generalized Body Aches (OTC meds not working)      Sore Throat   Associated symptoms include congestion and coughing.   Cough  Associated symptoms include a rash and a sore throat.   Conjunctivitis  This is a new problem. The current episode started in the past 7 days. Associated symptoms include congestion, coughing, a rash and a sore throat.   Rash  Associated symptoms include congestion, coughing and a sore throat.       Review of Systems   Constitutional: Negative.    HENT:  Positive for congestion and sore throat.    Respiratory:  Positive for cough.    Cardiovascular: Negative.    Gastrointestinal: Negative.    Endocrine: Negative.    Genitourinary: Negative.    Musculoskeletal: Negative.    Skin:  Positive for rash.   Neurological: Negative.    Psychiatric/Behavioral: Negative.            Past Medical History:   Diagnosis Date    Allergy     Fibroids     ARNOL (iron deficiency anemia) 2014    Painful orthopaedic hardware 2016    Short toes 2016    Toe contracture 2016     Past Surgical History:   Procedure Laterality Date     SECTION  ,     COLONOSCOPY N/A 10/23/2024     Procedure: COLONOSCOPY;  Surgeon: Keila Seo MD;  Location: [facility] ENDO;  Service: Endoscopy;  Laterality: N/A;  Referral:  Svitlana Becker MD / suprep / yuly portal - LW  10/14/24- case adj. 15 min, lvm/portal for pc. DBM  10/17/24- pc complete. DBM  10/18/24- r/s, instr to portal. DB  10/22 pt confirmed SW    CYSTOSCOPY N/A 02/20/2019    Procedure: CYSTOSCOPY;  Surgeon: Pollo Park MD;  Location: [facility] OR [address] [place] [address];  Service: Urology;  Laterality: N/A;  30 min/2nd floor OR    FOOT SURGERY Right     HYSTERECTOMY, TOTAL, LAPAROSCOPIC, WITH SALPINGECTOMY N/A 11/14/2024    Procedure: HYSTERECTOMY,TOTAL,LAPAROSCOPIC,WITH SALPINGECTOMY;  Surgeon: Nanette Timmons MD;  Location: [facility] OR;  Service: OB/GYN;  Laterality: N/A;  RN PREOP 11/7/2024   BLOOD REFUSAL SIGNED----UPT ON ARRIVAL    INJECTION OF BOTULINUM TOXIN TYPE A N/A 02/20/2019    Procedure: INJECTION, BOTULINUM TOXIN, TYPE A;  Surgeon: Pollo Park MD;  [facility];  Service: Urology;  Laterality: N/A;     Family History   Problem Relation Name Age of Onset    Hypertension Mother Umer Rodriguez     Rectal cancer Father Natan Rodriguez     No Known Problems Sister      Breast cancer Neg Hx      Colon cancer Neg Hx      Ovarian cancer Neg Hx      Hyperlipidemia Neg Hx      Heart disease Neg Hx      Diabetes Neg Hx      Arthritis Neg Hx       Social History     Socioeconomic History    Marital status: Single    Number of children: 2   Tobacco Use    Smoking status: Never     Passive exposure: Never    Smokeless tobacco: Never   Substance and Sexual Activity    Alcohol use: Not Currently     Comment: occasional    Drug use: No    Sexual activity: Not Currently     Partners: Male     Birth control/protection: Abstinence, None     Social Drivers of Health     Financial Resource Strain: Low Risk  (1/1/2025)    Overall Financial Resource Strain (CARDIA)     Difficulty of Paying Living Expenses: Not hard at all   Food Insecurity: No Food  Insecurity (1/1/2025)    Hunger Vital Sign     Worried About Running Out of Food in the Last Year: Never true     Ran Out of Food in the Last Year: Never true   Transportation Needs: Patient Declined (12/15/2023)    PRAPARE - Transportation     Lack of Transportation (Medical): Patient declined     Lack of Transportation (Non-Medical): Patient declined   Physical Activity: Unknown (1/1/2025)    Exercise Vital Sign     Days of Exercise per Week: 3 days   Stress: No Stress Concern Present (1/1/2025)    [employer] - Occupational Stress Questionnaire     Feeling of Stress : Not at all   Housing Stability: Unknown (1/1/2025)    Housing Stability Vital Sign     Unable to Pay for Housing in the Last Year: No     Current Medications[1]   Objective:      There were no vitals filed for this visit.    Physical Exam  Constitutional:       General: She is not in acute distress.     Appearance: She is not diaphoretic.   HENT:      Head: Normocephalic and atraumatic.   Eyes:      Conjunctiva/sclera:      Right eye: Right conjunctiva is injected.      Left eye: Left conjunctiva is injected.   Pulmonary:      Effort: Pulmonary effort is normal.   Musculoskeletal:         General: Normal range of motion.      Cervical back: Neck supple.   Skin:     Findings: Rash present.   Neurological:      Mental Status: She is alert and oriented to person, place, and time.   Psychiatric:         Behavior: Behavior normal.         Thought Content: Thought content normal.         Judgment: Judgment normal.            Assessment:       1. Sinusitis, unspecified chronicity, unspecified location        Plan:       Sinusitis, unspecified chronicity, unspecified location  -     methylPREDNISolone (MEDROL DOSEPACK) 4 mg tablet; use as directed  Dispense: 1 each; Refill: 0    Cont w/ otc meds. Advised warm compresses for her eyes.  Future Appointments   Date Time Provider Department Center   4/14/2025  8:00 AM LAB, [place]  [facility] [place]   4/17/2025 10:20 AM Nidia Traore MD [facility] MED [place]   5/21/2025  9:00 AM Abbie Harris NP [facility] PREOP [address]   5/21/2025 10:00 AM LAB, APPOINTMENT [facility] LAB [facility]   6/4/2025  9:45 AM Hermes Lugo MD [facility] PLASTIC [facility]                   Patient note was created using Belly.  Any errors in syntax or even information may not have been identified and edited on initial review prior to signing this note.       [1]   Current Outpatient Medications:     acetaminophen 325 mg Cap, Take 2 capsules every 4-6 hours by oral route. (Patient not taking: Reported on 4/4/2025), Disp: , Rfl:     ergocalciferol (ERGOCALCIFEROL) 50,000 unit Cap, Take 50,000 Units by mouth every 7 days. (Patient not taking: Reported on 4/4/2025), Disp: , Rfl:     ibuprofen (ADVIL,MOTRIN) 800 MG tablet, Take 1 tablet (800 mg total) by mouth 2 (two) times daily as needed for Pain. (Patient not taking: Reported on 4/4/2025), Disp: 60 tablet, Rfl: 2    methylPREDNISolone (MEDROL DOSEPACK) 4 mg tablet, use as directed, Disp: 1 each, Rfl: 0    neomycin-polymyxin-dexamethasone (MAXITROL) 3.5mg/mL-10,000 unit/mL-0.1 % DrpS, Place into both eyes. (Patient not taking: Reported on 4/4/2025), Disp: , Rfl:

## 2025-04-06 ENCOUNTER — E-VISIT (OUTPATIENT)
Dept: FAMILY MEDICINE | Facility: CLINIC | Age: 45
End: 2025-04-06
Payer: COMMERCIAL

## 2025-04-06 DIAGNOSIS — J06.9 ACUTE URI: Primary | ICD-10-CM

## 2025-04-08 RX ORDER — PROMETHAZINE HYDROCHLORIDE AND DEXTROMETHORPHAN HYDROBROMIDE 6.25; 15 MG/5ML; MG/5ML
5 SYRUP ORAL NIGHTLY PRN
Qty: 118 ML | Refills: 0 | Status: SHIPPED | OUTPATIENT
Start: 2025-04-08 | End: 2025-04-18

## 2025-04-08 RX ORDER — AZITHROMYCIN 250 MG/1
TABLET, FILM COATED ORAL
Qty: 6 TABLET | Refills: 0 | Status: SHIPPED | OUTPATIENT
Start: 2025-04-08 | End: 2025-04-13

## 2025-04-08 NOTE — PROGRESS NOTES
Patient ID: Umer Rodriguez is a 44 y.o. female.    Chief Complaint: General Illness (Entered automatically based on patient selection in REVShare.)    The patient initiated a request through REVShare on 4/6/2025 for evaluation and management with a chief complaint of General Illness (Entered automatically based on patient selection in REVShare.)     I evaluated the questionnaire submission on 4/6/2025.    [facility] P[facility] Evisit [facility]-Cough And Cold    4/6/2025  8:37 AM CDT - Filed by Patient   What do you need help with? Flu   Do you agree to participate in an E-Visit? Yes   If you have any of the following symptoms, go to your local emergency room or call 911: I acknowledge   Do you have any of the following pregnancy-related conditions? None   What is the main issue you would like addressed today? Flu   Do you think you might have COVID-19 or the Flu? Flu   Have you tested positive for COVID-19 or Flu? No   What symptoms do you have? Chills;  Cough;  Fever;  Headache;  Stuffy nose;  Muscle or body aches;  Runny nose;  Sore throat   When did your concern begin? 4/4/2025   Have you tried any of the following to help your symptoms? Cold medication;  Cough syrup;  Drinking more fluids;  Rest and sleep;  Tylenol   Provide any additional information you feel is important.    Please attach any relevant images or files    Are you able to take your vital signs? No         Encounter Diagnosis   Name Primary?    Acute URI Yes        No orders of the defined types were placed in this encounter.     Medications Ordered This Encounter   Medications    azithromycin (Z-FAUSTINA) 250 MG tablet     Sig: Take 2 tablets by mouth on day 1; Take 1 tablet by mouth on days 2-5     Dispense:  6 tablet     Refill:  0        No follow-ups on file.      E-Visit Time Tracking:    Day 1 Time (in minutes): 5    Total Time (in minutes): 5

## 2025-04-09 ENCOUNTER — PATIENT MESSAGE (OUTPATIENT)
Dept: FAMILY MEDICINE | Facility: CLINIC | Age: 45
End: 2025-04-09
Payer: COMMERCIAL

## 2025-04-14 ENCOUNTER — LAB VISIT (OUTPATIENT)
Dept: LAB | Facility: HOSPITAL | Age: 45
End: 2025-04-14
Attending: INTERNAL MEDICINE
Payer: COMMERCIAL

## 2025-04-14 DIAGNOSIS — R73.03 PREDIABETES: ICD-10-CM

## 2025-04-14 DIAGNOSIS — E55.9 VITAMIN D DEFICIENCY: ICD-10-CM

## 2025-04-14 DIAGNOSIS — R79.9 ABNORMAL FINDING OF BLOOD CHEMISTRY, UNSPECIFIED: ICD-10-CM

## 2025-04-14 DIAGNOSIS — D50.0 IRON DEFICIENCY ANEMIA DUE TO CHRONIC BLOOD LOSS: ICD-10-CM

## 2025-04-14 LAB
25(OH)D3+25(OH)D2 SERPL-MCNC: 17 NG/ML (ref 30–96)
ABSOLUTE EOSINOPHIL (OHS): 0.16 K/UL
ABSOLUTE MONOCYTE (OHS): 0.55 K/UL (ref 0.3–1)
ABSOLUTE NEUTROPHIL COUNT (OHS): 4.3 K/UL (ref 1.8–7.7)
ALBUMIN SERPL BCP-MCNC: 3 G/DL (ref 3.5–5.2)
ALP SERPL-CCNC: 68 UNIT/L (ref 40–150)
ALT SERPL W/O P-5'-P-CCNC: 19 UNIT/L (ref 10–44)
ANION GAP (OHS): 8 MMOL/L (ref 8–16)
AST SERPL-CCNC: 19 UNIT/L (ref 11–45)
BASOPHILS # BLD AUTO: 0.03 K/UL
BASOPHILS NFR BLD AUTO: 0.4 %
BILIRUB SERPL-MCNC: 0.4 MG/DL (ref 0.1–1)
BUN SERPL-MCNC: 13 MG/DL (ref 6–20)
CALCIUM SERPL-MCNC: 8.5 MG/DL (ref 8.7–10.5)
CHLORIDE SERPL-SCNC: 109 MMOL/L (ref 95–110)
CHOLEST SERPL-MCNC: 198 MG/DL (ref 120–199)
CHOLEST/HDLC SERPL: 4.4 {RATIO} (ref 2–5)
CO2 SERPL-SCNC: 23 MMOL/L (ref 23–29)
CREAT SERPL-MCNC: 0.8 MG/DL (ref 0.5–1.4)
EAG (OHS): 114 MG/DL (ref 68–131)
ERYTHROCYTE [DISTWIDTH] IN BLOOD BY AUTOMATED COUNT: 14.3 % (ref 11.5–14.5)
FERRITIN SERPL-MCNC: 15 NG/ML (ref 20–300)
GFR SERPLBLD CREATININE-BSD FMLA CKD-EPI: >60 ML/MIN/1.73/M2
GLUCOSE SERPL-MCNC: 86 MG/DL (ref 70–110)
HBA1C MFR BLD: 5.6 % (ref 4–5.6)
HCT VFR BLD AUTO: 43.8 % (ref 37–48.5)
HDLC SERPL-MCNC: 45 MG/DL (ref 40–75)
HDLC SERPL: 22.7 % (ref 20–50)
HGB BLD-MCNC: 13.5 GM/DL (ref 12–16)
IMM GRANULOCYTES # BLD AUTO: 0.02 K/UL (ref 0–0.04)
IMM GRANULOCYTES NFR BLD AUTO: 0.3 % (ref 0–0.5)
IRON SATN MFR SERPL: 11 % (ref 20–50)
IRON SERPL-MCNC: 40 UG/DL (ref 30–160)
LDLC SERPL CALC-MCNC: 139.4 MG/DL (ref 63–159)
LYMPHOCYTES # BLD AUTO: 1.98 K/UL (ref 1–4.8)
MCH RBC QN AUTO: 28.1 PG (ref 27–31)
MCHC RBC AUTO-ENTMCNC: 30.8 G/DL (ref 32–36)
MCV RBC AUTO: 91 FL (ref 82–98)
NONHDLC SERPL-MCNC: 153 MG/DL
NUCLEATED RBC (/100WBC) (OHS): 0 /100 WBC
PLATELET # BLD AUTO: 176 K/UL (ref 150–450)
PMV BLD AUTO: 12.2 FL (ref 9.2–12.9)
POTASSIUM SERPL-SCNC: 3.8 MMOL/L (ref 3.5–5.1)
PROT SERPL-MCNC: 6.4 GM/DL (ref 6–8.4)
RBC # BLD AUTO: 4.8 M/UL (ref 4–5.4)
RELATIVE EOSINOPHIL (OHS): 2.3 %
RELATIVE LYMPHOCYTE (OHS): 28.1 % (ref 18–48)
RELATIVE MONOCYTE (OHS): 7.8 % (ref 4–15)
RELATIVE NEUTROPHIL (OHS): 61.1 % (ref 38–73)
SODIUM SERPL-SCNC: 140 MMOL/L (ref 136–145)
TIBC SERPL-MCNC: 360 UG/DL (ref 250–450)
TRANSFERRIN SERPL-MCNC: 243 MG/DL (ref 200–375)
TRIGL SERPL-MCNC: 68 MG/DL (ref 30–150)
WBC # BLD AUTO: 7.04 K/UL (ref 3.9–12.7)

## 2025-04-14 PROCEDURE — 80061 LIPID PANEL: CPT

## 2025-04-14 PROCEDURE — 36415 COLL VENOUS BLD VENIPUNCTURE: CPT | Mod: PO

## 2025-04-14 PROCEDURE — 83540 ASSAY OF IRON: CPT

## 2025-04-14 PROCEDURE — 82306 VITAMIN D 25 HYDROXY: CPT

## 2025-04-14 PROCEDURE — 80053 COMPREHEN METABOLIC PANEL: CPT

## 2025-04-14 PROCEDURE — 82728 ASSAY OF FERRITIN: CPT

## 2025-04-14 PROCEDURE — 83036 HEMOGLOBIN GLYCOSYLATED A1C: CPT

## 2025-04-14 PROCEDURE — 85025 COMPLETE CBC W/AUTO DIFF WBC: CPT

## 2025-04-17 ENCOUNTER — OFFICE VISIT (OUTPATIENT)
Dept: FAMILY MEDICINE | Facility: CLINIC | Age: 45
End: 2025-04-17
Payer: COMMERCIAL

## 2025-04-17 VITALS
HEIGHT: 62 IN | WEIGHT: 188.06 LBS | SYSTOLIC BLOOD PRESSURE: 130 MMHG | OXYGEN SATURATION: 99 % | DIASTOLIC BLOOD PRESSURE: 90 MMHG | BODY MASS INDEX: 34.61 KG/M2 | RESPIRATION RATE: 18 BRPM | TEMPERATURE: 98 F | HEART RATE: 92 BPM

## 2025-04-17 DIAGNOSIS — R77.0 LOW SERUM ALBUMIN: ICD-10-CM

## 2025-04-17 DIAGNOSIS — D50.0 IRON DEFICIENCY ANEMIA DUE TO CHRONIC BLOOD LOSS: ICD-10-CM

## 2025-04-17 DIAGNOSIS — Q05.7 SPINA BIFIDA OF LUMBOSACRAL REGION WITHOUT HYDROCEPHALUS: ICD-10-CM

## 2025-04-17 DIAGNOSIS — E55.9 VITAMIN D DEFICIENCY: ICD-10-CM

## 2025-04-17 DIAGNOSIS — Z90.710 S/P LAPAROSCOPIC HYSTERECTOMY: ICD-10-CM

## 2025-04-17 DIAGNOSIS — R79.9 ABNORMAL FINDING OF BLOOD CHEMISTRY, UNSPECIFIED: ICD-10-CM

## 2025-04-17 DIAGNOSIS — R73.03 PREDIABETES: ICD-10-CM

## 2025-04-17 DIAGNOSIS — R09.82 POSTNASAL DRIP: ICD-10-CM

## 2025-04-17 DIAGNOSIS — R09.81 SINUS CONGESTION: Primary | ICD-10-CM

## 2025-04-17 DIAGNOSIS — N62 MACROMASTIA: ICD-10-CM

## 2025-04-17 PROBLEM — N92.0 MENORRHAGIA WITH REGULAR CYCLE: Status: RESOLVED | Noted: 2024-10-24 | Resolved: 2025-04-17

## 2025-04-17 PROBLEM — N93.9 ABNORMAL UTERINE BLEEDING (AUB): Status: RESOLVED | Noted: 2024-11-14 | Resolved: 2025-04-17

## 2025-04-17 PROBLEM — N94.6 DYSMENORRHEA: Status: RESOLVED | Noted: 2024-11-14 | Resolved: 2025-04-17

## 2025-04-17 PROCEDURE — 3008F BODY MASS INDEX DOCD: CPT | Mod: CPTII,S$GLB,, | Performed by: INTERNAL MEDICINE

## 2025-04-17 PROCEDURE — 3075F SYST BP GE 130 - 139MM HG: CPT | Mod: CPTII,S$GLB,, | Performed by: INTERNAL MEDICINE

## 2025-04-17 PROCEDURE — 1159F MED LIST DOCD IN RCRD: CPT | Mod: CPTII,S$GLB,, | Performed by: INTERNAL MEDICINE

## 2025-04-17 PROCEDURE — 99215 OFFICE O/P EST HI 40 MIN: CPT | Mod: S$GLB,,, | Performed by: INTERNAL MEDICINE

## 2025-04-17 PROCEDURE — 99999 PR PBB SHADOW E&M-EST. PATIENT-LVL IV: CPT | Mod: PBBFAC,,, | Performed by: INTERNAL MEDICINE

## 2025-04-17 PROCEDURE — 3080F DIAST BP >= 90 MM HG: CPT | Mod: CPTII,S$GLB,, | Performed by: INTERNAL MEDICINE

## 2025-04-17 PROCEDURE — 1160F RVW MEDS BY RX/DR IN RCRD: CPT | Mod: CPTII,S$GLB,, | Performed by: INTERNAL MEDICINE

## 2025-04-17 PROCEDURE — 3044F HG A1C LEVEL LT 7.0%: CPT | Mod: CPTII,S$GLB,, | Performed by: INTERNAL MEDICINE

## 2025-04-17 RX ORDER — BENZONATATE 100 MG/1
100 CAPSULE ORAL 3 TIMES DAILY PRN
Qty: 30 CAPSULE | Refills: 0 | Status: SHIPPED | OUTPATIENT
Start: 2025-04-17 | End: 2025-04-27

## 2025-04-17 RX ORDER — ERGOCALCIFEROL 1.25 MG/1
50000 CAPSULE ORAL
Qty: 51 CAPSULE | Refills: 0 | Status: SHIPPED | OUTPATIENT
Start: 2025-04-17 | End: 2026-04-17

## 2025-04-17 NOTE — ASSESSMENT & PLAN NOTE
Improving   - cont promethazine nightly   - tessalon in the AM   - filled out FMLA paper work for time off early April 2/2 [place]

## 2025-04-17 NOTE — ASSESSMENT & PLAN NOTE
- Evaluated vitamin D levels: low but improved from previous year.  - Explained importance for calcium and magnesium absorption.  - Recommend increasing dose frequency and instructed patient to set a reminder for weekly supplementation.  - cont vit d 50,000 units weekly

## 2025-04-17 NOTE — PROGRESS NOTES
Chief Complaint: Follow-up and Nasal Congestion      Umer Rodriguez  is a 44 y.o. year old patient who presents today for     History of Present Illness    CHIEF COMPLAINT:  Umer presents today for follow up after hysterectomy and to review lab results.    POST-HYSTERECTOMY STATUS:  She underwent hysterectomy in November, reports good recovery, and denies any post-operative vaginal bleeding.    UPCOMING SURGERY:  She has breast reduction surgery scheduled for May 27th.    UPPER RESPIRATORY INFECTION:  She reports ongoing congestion from a recent upper respiratory infection that resulted in limited mobility and missed work days.    MEDICATIONS:  She takes Tylenol and ibuprofen as needed for pain. She continues Promethazine for cough but reports undesirable drowsiness. She is prescribed weekly vitamin D supplementation but reports inconsistent adherence.    LABORATORY RESULTS:  Cholesterol within normal limits with slightly elevated LDL. Triglycerides have improved. Vitamin D is low. Liver enzymes and kidney function are normal. Albumin is low. Iron levels and ferritin are improving.      ROS:  General: -fever, -chills, -fatigue, -weight gain, -weight loss  Eyes: -vision changes, -redness, -discharge  ENT: -ear pain, +nasal congestion, -sore throat  Cardiovascular: -chest pain, -palpitations, -lower extremity edema  Respiratory: +cough, -shortness of breath  Gastrointestinal: -abdominal pain, -nausea, -vomiting, -diarrhea, -constipation, -blood in stool  Genitourinary: -dysuria, -hematuria, -frequency  Musculoskeletal: -joint pain, -muscle pain, +limited movement  Skin: -rash, -lesion  Neurological: -headache, -dizziness, -numbness, -tingling, +excessive drowsiness  Psychiatric: -anxiety, -depression, -sleep difficulty         Past Medical History:   Diagnosis Date    Allergy     Dysmenorrhea 11/14/2024    Fibroids     ARNOL (iron deficiency anemia) 01/21/2014    Menorrhagia with regular cycle 10/24/2024     Painful orthopaedic hardware 2016    Short toes 2016    Toe contracture 2016       Past Surgical History:   Procedure Laterality Date     SECTION  2013    COLONOSCOPY N/A 10/23/2024    Procedure: COLONOSCOPY;  Surgeon: Keila Seo MD;  Location: [facility] ENDO;  Service: Endoscopy;  Laterality: N/A;  Referral:  Svitlana Becker MD / suprep / inst portal - LW  10/14/24- case adj. 15 min, lvm/portal for pc. DBM  10/17/24- pc complete. DBM  10/18/24- r/s, instr to portal. DBM  10/22 pt confirmed SW    CYSTOSCOPY N/A 2019    Procedure: CYSTOSCOPY;  Surgeon: Pollo Park MD;  Location: [facility] OR [address] [place] [address];  Service: Urology;  Laterality: N/A;  30 min/2nd floor OR    FOOT SURGERY Right     HYSTERECTOMY, TOTAL, LAPAROSCOPIC, WITH SALPINGECTOMY N/A 2024    Procedure: HYSTERECTOMY,TOTAL,LAPAROSCOPIC,WITH SALPINGECTOMY;  Surgeon: Nanette Timmons MD;  Location: [facility] OR;  Service: OB/GYN;  Laterality: N/A;  RN PREOP 2024   BLOOD REFUSAL SIGNED----UPT ON ARRIVAL    INJECTION OF BOTULINUM TOXIN TYPE A N/A 2019    Procedure: INJECTION, BOTULINUM TOXIN, TYPE A;  Surgeon: Pollo Park MD;  Location: [facility] OR [address] [place] [address];  Service: Urology;  Laterality: N/A;        Family History   Problem Relation Name Age of Onset    Hypertension Mother Umer Rodriguez     Rectal cancer Father Natan Rodriguez     Cancer Father Natan Rodriguez     No Known Problems Sister      Breast cancer Neg Hx      Colon cancer Neg Hx      Ovarian cancer Neg Hx      Hyperlipidemia Neg Hx      Heart disease Neg Hx      Diabetes Neg Hx      Arthritis Neg Hx          Social History     Socioeconomic History    Marital status: Single    Number of children: 2   Occupational History     Employer: [employer]   Tobacco Use    Smoking status: Never     Passive exposure: Never    Smokeless tobacco: Never   Substance and Sexual Activity    Alcohol use: Not Currently     Comment: occasional    Drug use: No    Sexual  activity: Not Currently     Partners: Male     Birth control/protection: Abstinence, None     Social Drivers of Health     Financial Resource Strain: Low Risk  (1/1/2025)    Overall Financial Resource Strain (CARDIA)     Difficulty of Paying Living Expenses: Not hard at all   Food Insecurity: No Food Insecurity (1/1/2025)    Hunger Vital Sign     Worried About Running Out of Food in the Last Year: Never true     Ran Out of Food in the Last Year: Never true   Transportation Needs: Patient Declined (12/15/2023)    PRAPARE - Transportation     Lack of Transportation (Medical): Patient declined     Lack of Transportation (Non-Medical): Patient declined   Physical Activity: Unknown (1/1/2025)    Exercise Vital Sign     Days of Exercise per Week: 3 days   Stress: No Stress Concern Present (1/1/2025)    [employer] [place] [employer] - Occupational Stress Questionnaire     Feeling of Stress : Not at all   Housing Stability: Unknown (1/1/2025)    Housing Stability Vital Sign     Unable to Pay for Housing in the Last Year: No       Current Medications[1]           Objective:      Vitals:    04/17/25 1052   BP: (!) 130/90   Pulse:    Resp:    Temp:        Physical Exam  Constitutional:       Appearance: Normal appearance.   HENT:      Head: Normocephalic and atraumatic.   Skin:     General: Skin is warm and dry.   Neurological:      General: No focal deficit present.      Mental Status: She is alert and oriented to person, place, and time.   Psychiatric:         Mood and Affect: Mood normal.         Behavior: Behavior normal.          Assessment:       1. Sinus congestion    2. Postnasal drip    3. Vitamin D deficiency    4. Abnormal finding of blood chemistry, unspecified    5. Iron deficiency anemia due to chronic blood loss    6. Prediabetes    7. Spina bifida of lumbosacral region without hydrocephalus    8. Low serum albumin    9. S/P laparoscopic hysterectomy    10. Macromastia          Plan:   1. Sinus  congestion  Assessment & Plan:  Improving   - cont promethazine nightly   - tessalon in the AM   - filled out FMLA paper work for time off early April 2/2 [employer]      2. Postnasal drip  Assessment & Plan:  Causing coughing   - tessalon PRN    Orders:  -     benzonatate (TESSALON) 100 MG capsule; Take 1 capsule (100 mg total) by mouth 3 (three) times daily as needed for Cough.  Dispense: 30 capsule; Refill: 0    3. Vitamin D deficiency  Assessment & Plan:  - Evaluated vitamin D levels: low but improved from previous year.  - Explained importance for calcium and magnesium absorption.  - Recommend increasing dose frequency and instructed patient to set a reminder for weekly supplementation.  - cont vit d 50,000 units weekly    Orders:  -     ergocalciferol (ERGOCALCIFEROL) 50,000 unit Cap; Take 1 capsule (50,000 Units total) by mouth every 7 days.  Dispense: 51 capsule; Refill: 0  -     Vitamin D; Future; Expected date: 04/17/2025    4. Abnormal finding of blood chemistry, unspecified  -     Lipid Panel; Future; Expected date: 04/17/2025    5. Iron deficiency anemia due to chronic blood loss  Assessment & Plan:  - Evaluated improved hemoglobin and ferritin levels.  - Discontinued iron supplementation based on improvement, noting iron stores are still slightly low.  - Educated patient on the role of vitamin C in iron absorption.  - Recommend stopping supplements unless anemia recurs.    Orders:  -     CBC Auto Differential; Future; Expected date: 04/17/2025  -     Comprehensive Metabolic Panel; Future; Expected date: 04/17/2025  -     Iron and TIBC; Future; Expected date: 04/17/2025  -     Ferritin; Future; Expected date: 04/17/2025    6. Prediabetes  -     Hemoglobin A1C; Future; Expected date: 04/17/2025    7. Spina bifida of lumbosacral region without hydrocephalus  Assessment & Plan:  Has spina bifida (minor) since birth with occasional leakage robi when she coughs.       8. Low serum albumin  Assessment & Plan:  -  Noted low albumin levels at 3, but not critically low.  - Discussed relationship with calcium levels and assessed possible causes, ruling out kidney and liver issues.  - Will continue to monitor albumin levels.      9. S/P laparoscopic hysterectomy  Assessment & Plan:  - Confirmed patient is feeling well post-hysterectomy performed in November.  - Acknowledged impact on future health screenings.      10. Macromastia  Assessment & Plan:  - schedule for breast reduction         UPPER RESPIRATORY INFECTION:  - Evaluated patient's ongoing congestion and cough from recent upper respiratory infection.  - Prescribed benzonatate for non-drowsy daytime cough suppression and continued promethazine as needed for nighttime use.  - Noted previous treatment with steroids and antibiotics.    CHOLESTEROL:  - Assessed recent labs: cholesterol within normal limits, LDL slightly elevated.  - Noted improvement in triglyceride levels.        Total 40 mins spent on patient with more than 50% spent counseling and paperwork    Follow up in about 1 year (around 4/17/2026) for Annual.    This note was generated with the assistance of ambient listening technology. Verbal consent was obtained by the patient and accompanying visitor(s) for the recording of patient appointment to facilitate this note. I attest to having reviewed and edited the generated note for accuracy, though some syntax or spelling errors may persist. Please contact the author of this note for any clarification.                [1]   Current Outpatient Medications:     acetaminophen 325 mg Cap, , Disp: , Rfl:     ibuprofen (ADVIL,MOTRIN) 800 MG tablet, Take 1 tablet (800 mg total) by mouth 2 (two) times daily as needed for Pain., Disp: 60 tablet, Rfl: 2    promethazine-dextromethorphan (PROMETHAZINE-DM) 6.25-15 mg/5 mL Syrp, Take 5 mLs by mouth nightly as needed., Disp: 118 mL, Rfl: 0    benzonatate (TESSALON) 100 MG capsule, Take 1 capsule (100 mg total) by mouth 3 (three)  times daily as needed for Cough., Disp: 30 capsule, Rfl: 0    ergocalciferol (ERGOCALCIFEROL) 50,000 unit Cap, Take 1 capsule (50,000 Units total) by mouth every 7 days., Disp: 51 capsule, Rfl: 0

## 2025-04-17 NOTE — ASSESSMENT & PLAN NOTE
- Confirmed patient is feeling well post-hysterectomy performed in November.  - Acknowledged impact on future health screenings.

## 2025-04-17 NOTE — ASSESSMENT & PLAN NOTE
- Evaluated improved hemoglobin and ferritin levels.  - Discontinued iron supplementation based on improvement, noting iron stores are still slightly low.  - Educated patient on the role of vitamin C in iron absorption.  - Recommend stopping supplements unless anemia recurs.

## 2025-04-17 NOTE — ASSESSMENT & PLAN NOTE
- Noted low albumin levels at 3, but not critically low.  - Discussed relationship with calcium levels and assessed possible causes, ruling out kidney and liver issues.  - Will continue to monitor albumin levels.

## 2025-04-17 NOTE — PROGRESS NOTES
Health Maintenance Due   Topic     Influenza Vaccine (1) Patient advised to go to pharmacy    COVID-19 Vaccine (4 - 2024-25 season) Patient declined

## 2025-04-18 ENCOUNTER — PATIENT MESSAGE (OUTPATIENT)
Dept: FAMILY MEDICINE | Facility: CLINIC | Age: 45
End: 2025-04-18
Payer: COMMERCIAL

## 2025-04-29 ENCOUNTER — PATIENT MESSAGE (OUTPATIENT)
Dept: FAMILY MEDICINE | Facility: CLINIC | Age: 45
End: 2025-04-29
Payer: COMMERCIAL

## 2025-05-08 ENCOUNTER — CLINICAL SUPPORT (OUTPATIENT)
Dept: FAMILY MEDICINE | Facility: CLINIC | Age: 45
End: 2025-05-08
Payer: COMMERCIAL

## 2025-05-08 VITALS — SYSTOLIC BLOOD PRESSURE: 118 MMHG | DIASTOLIC BLOOD PRESSURE: 76 MMHG

## 2025-05-08 DIAGNOSIS — R03.0 ELEVATED BLOOD PRESSURE READING WITHOUT DIAGNOSIS OF HYPERTENSION: Primary | ICD-10-CM

## 2025-05-08 PROCEDURE — 99999 PR PBB SHADOW E&M-EST. PATIENT-LVL I: CPT | Mod: PBBFAC,,,

## 2025-05-08 NOTE — PROGRESS NOTES
Umer Rodriguez 44 y.o. female is here today for Blood Pressure check.   History of HTN no.    Review of patient's allergies indicates:   Allergen Reactions    Oxycodone Anaphylaxis     Creatinine   Date Value Ref Range Status   04/14/2025 0.8 0.5 - 1.4 mg/dL Final     Sodium   Date Value Ref Range Status   04/14/2025 140 136 - 145 mmol/L Final   11/07/2024 140 136 - 145 mmol/L Final     Potassium   Date Value Ref Range Status   04/14/2025 3.8 3.5 - 5.1 mmol/L Final   11/07/2024 4.2 3.5 - 5.1 mmol/L Final   ]  Patient denies taking blood pressure medications on a regular basis at the same time of the day.   Current Medications[1]  Does patient have record of home blood pressure readings no. Readings have been averaging not done.   Last dose of blood pressure medication was taken at not on medications.  Patient is asymptomatic.   Complains of no complaints.    Vitals:    05/08/25 1106   BP: 118/76   BP Location: Right arm   Patient Position: Sitting         Dr. Traore informed of nurse visit.            [1]   Current Outpatient Medications:     acetaminophen 325 mg Cap, , Disp: , Rfl:     ergocalciferol (ERGOCALCIFEROL) 50,000 unit Cap, Take 1 capsule (50,000 Units total) by mouth every 7 days., Disp: 51 capsule, Rfl: 0    ibuprofen (ADVIL,MOTRIN) 800 MG tablet, Take 1 tablet (800 mg total) by mouth 2 (two) times daily as needed for Pain., Disp: 60 tablet, Rfl: 2

## 2025-05-13 ENCOUNTER — OFFICE VISIT (OUTPATIENT)
Dept: FAMILY MEDICINE | Facility: CLINIC | Age: 45
End: 2025-05-13
Payer: COMMERCIAL

## 2025-05-13 VITALS
TEMPERATURE: 99 F | HEART RATE: 76 BPM | DIASTOLIC BLOOD PRESSURE: 70 MMHG | OXYGEN SATURATION: 99 % | BODY MASS INDEX: 34.4 KG/M2 | HEIGHT: 62 IN | WEIGHT: 186.94 LBS | RESPIRATION RATE: 16 BRPM | SYSTOLIC BLOOD PRESSURE: 118 MMHG

## 2025-05-13 DIAGNOSIS — M54.9 BACK PAIN, UNSPECIFIED BACK LOCATION, UNSPECIFIED BACK PAIN LATERALITY, UNSPECIFIED CHRONICITY: Primary | ICD-10-CM

## 2025-05-13 DIAGNOSIS — M79.601 RIGHT ARM PAIN: ICD-10-CM

## 2025-05-13 PROCEDURE — 99999 PR PBB SHADOW E&M-EST. PATIENT-LVL III: CPT | Mod: PBBFAC,,,

## 2025-05-13 RX ORDER — KETOROLAC TROMETHAMINE 30 MG/ML
30 INJECTION, SOLUTION INTRAMUSCULAR; INTRAVENOUS
Status: COMPLETED | OUTPATIENT
Start: 2025-05-13 | End: 2025-05-13

## 2025-05-13 RX ADMIN — KETOROLAC TROMETHAMINE 30 MG: 30 INJECTION, SOLUTION INTRAMUSCULAR; INTRAVENOUS at 11:05

## 2025-05-13 NOTE — PROGRESS NOTES
HPI     Chief Complaint:  Chief Complaint   Patient presents with    Back Pain     Lower back right side       Umer Rodriguez is a 44 y.o. female with multiple medical diagnoses as listed in the medical history and problem list that presents for Back pain right side    HPI    History of Present Illness    CHIEF COMPLAINT:  Umer presents today for back pain    HISTORY OF PRESENT ILLNESS:  She presents with right-sided back pain that began Wednesday morning after waking up with her son in bed. Pain is rated as 8-9/10 and is described as a pulling sensation, particularly noticeable when walking. She also reports right arm pain that began last night after sleeping on her right side, also described as a pulling sensation. Both back and arm pain have disrupted her sleep. She works in an occupation requiring lifting activities.    MEDICATIONS:  She currently takes methocarbamol (Robaxin) 750mg since April. Trial of naproxen provided no pain relief.    MEDICAL HISTORY:  History of work-related left ankle injury from rolling the ankle.    FAMILY HISTORY:  Father  due to cancer with spinal mass.      ROS:  General: -fever, -chills, -fatigue, -weight gain, -weight loss, +sleep disturbances  Eyes: -vision changes, -redness, -discharge  ENT: -ear pain, -nasal congestion, -sore throat  Cardiovascular: -chest pain, -palpitations, -lower extremity edema  Respiratory: -cough, -shortness of breath  Gastrointestinal: -abdominal pain, -nausea, -vomiting, -diarrhea, -constipation, -blood in stool  Genitourinary: -dysuria, -hematuria, -frequency  Musculoskeletal: -joint pain, -muscle pain, +back pain, +neck pain, +limb pain  Skin: -rash, -lesion  Neurological: -headache, -dizziness, -numbness, -tingling  Psychiatric: -anxiety, -depression, +sleep difficulty             Assessment & Plan     Assessment & Plan    Assessed lower back pain, likely due to muscle spasm based on exam and reported symptoms.  Ruled out  sciatica and kidney issues based on localized pain and absence of urinary symptoms.  Recommend conservative management with heat therapy, stretches, muscle relaxants, and anti-inflammatory medication.  Offered steroid injection as an option for pain relief if conservative measures fail.  Discussed potential for US and physical therapy if pain persists after initial interventions.    FOLLOW-UP INSTRUCTIONS:  - Instructed to follow up if symptoms do not improve with current treatment plan.  - Advised to contact the office if symptoms worsen or new concerns arise.         Problem List Items Addressed This Visit       Back pain - Primary    Relevant Medications    ketorolac injection 30 mg (Completed)  LOW BACK PAIN: - Monitored the patient's back pain which started Wednesday, localized to the lower back on the right side. - Pain is described as a pulling sensation, rated 8-9/10, and worsened by work and inability to sleep. - Casheka denies changes in urine or swelling. - Examination revealed taut muscle on the right side compared to the left, indicating a muscle spasm. - Assessed potential causes including lifting at work and son's knee in back while sleeping. - Explained the nature of muscle spasms and how they can cause pain and restricted movement, differentiating between muscular pain and potential organ-related issues. - Administered Toradol injection for immediate pain relief. - Continued methocarbamol 750 mg as needed for muscle spasms, to be taken within an hour before bedtime due to drowsiness. - Advised patient may take naproxen or ibuprofen for additional pain relief if needed. - Instructed to apply heating pad to affected area for at least 20 minutes before performing prescribed lower back stretches. - Recommend incorporating daily stretches and exercises to strengthen muscles for work-related lifting to prevent future injuries. - Will consider ultrasound if no improvement with current plan.       Right arm  "pain    Relevant Medications    ketorolac injection 30 mg (Completed)   PAIN IN RIGHT UPPER ARM:  - Monitored the patient's right arm pain, described as a pulling sensation that worsens in certain positions and feels better when flexed.  - Prescribed muscle relaxer and recommended application of heat for treatment.    ## FAMILY HISTORY OF CANCER:  - Assessed the patient's concern about family history of cancer, specifically noting father's death from cancer with a mass on his spine.      --------------------------------------------      Health Maintenance:  Health Maintenance         Date Due Completion Date    COVID-19 Vaccine (4 - 2024-25 season) 09/01/2024 11/8/2021    Influenza Vaccine (Season Ended) 09/01/2025 4/8/2024    Mammogram 10/24/2025 10/24/2024    Hemoglobin A1c (Prediabetes) 04/14/2026 4/14/2025    TETANUS VACCINE 10/24/2028 10/24/2018    Lipid Panel 04/14/2030 4/14/2025    Colorectal Cancer Screening 10/23/2034 10/23/2024    RSV Vaccine (Age 60+ and Pregnant patients) (1 - 1-dose 75+ series) 05/22/2054 ---            Health maintenance reviewed and Advised patient on the importance of completing overdue health maintenance items    Follow Up:  Follow up if symptoms worsen or fail to improve.    Exam     Review of Systems:  (as noted above)  Review of Systems    Physical Exam:   Physical Exam  Vitals:    05/13/25 1003   BP: 118/70   BP Location: Left arm   Patient Position: Sitting   Pulse: 76   Resp: 16   Temp: 98.7 °F (37.1 °C)   TempSrc: Oral   SpO2: 99%   Weight: 84.8 kg (186 lb 15.2 oz)   Height: 5' 2" (1.575 m)      Body mass index is 34.19 kg/m².    Physical Exam    General: No acute distress. Well-developed. Well-nourished.  Eyes: EOMI. Sclerae anicteric.  HENT: Normocephalic. Atraumatic. Nares patent. Moist oral mucosa.  Ears: Bilateral TMs clear. Bilateral EACs clear.  Cardiovascular: Regular rate. Regular rhythm. No murmurs. No rubs. No gallops. Normal S1, S2.  Respiratory: Normal " respiratory effort. Clear to auscultation bilaterally. No rales. No rhonchi. No wheezing.  Abdomen: Soft. Non-tender. Non-distended. Normoactive bowel sounds.  Musculoskeletal: No  obvious deformity.  Extremities: No lower extremity edema.  Neurological: Alert & oriented x3. No slurred speech. Normal gait.  Psychiatric: Normal mood. Normal affect. Good insight. Good judgment.  Skin: Warm. Dry. No rash.  Back: Spasm in right lower back. Right side more taut compared to left.           History     Past Medical History:  Past Medical History:   Diagnosis Date    Allergy     Dysmenorrhea 2024    Fibroids     ARNOL (iron deficiency anemia) 2014    Menorrhagia with regular cycle 10/24/2024    Painful orthopaedic hardware 2016    Short toes 2016    Toe contracture 2016       Past Surgical History:  Past Surgical History:   Procedure Laterality Date     SECTION  ,     COLONOSCOPY N/A 10/23/2024    Procedure: COLONOSCOPY;  Surgeon: Keila Seo MD;  Location: [facility];  Service: Endoscopy;  Laterality: N/A;  Referral:  Svitlana Becker MD / suprep / inst portal - LW  10/14/24- case adj. 15 min, lvm/portal for pc. DBM  10/17/24- pc complete. DB  10/18/24- r/s, instr to portal. [facility]  10/22 pt confirmed SW    CYSTOSCOPY N/A 2019    Procedure: CYSTOSCOPY;  Surgeon: Pollo Park MD;  Location: [facility] OR [address] [place] [address];  Service: Urology;  Laterality: N/A;  30 min/2nd floor OR    FOOT SURGERY Right     HYSTERECTOMY, TOTAL, LAPAROSCOPIC, WITH SALPINGECTOMY N/A 2024    Procedure: HYSTERECTOMY,TOTAL,LAPAROSCOPIC,WITH SALPINGECTOMY;  Surgeon: Nanette Timmons MD;  Location: [facility] OR;  Service: OB/GYN;  Laterality: N/A;  RN PREOP 2024   BLOOD REFUSAL SIGNED----UPT ON ARRIVAL    INJECTION OF BOTULINUM TOXIN TYPE A N/A 2019    Procedure: INJECTION, BOTULINUM TOXIN, TYPE A;  Surgeon: Pollo Park MD;  Location: [facility] OR [address] [place] [address];  Service: Urology;  Laterality: N/A;        Social History:  Social History[1]    Family History:  Family History   Problem Relation Name Age of Onset    Hypertension Mother Umer Rodriguez     Rectal cancer Father Natan Rodriguez     Cancer Father Natan Rodriguez     No Known Problems Sister      Breast cancer Neg Hx      Colon cancer Neg Hx      Ovarian cancer Neg Hx      Hyperlipidemia Neg Hx      Heart disease Neg Hx      Diabetes Neg Hx      Arthritis Neg Hx         Allergies and Medications: (updated and reviewed)  Review of patient's allergies indicates:   Allergen Reactions    Oxycodone Anaphylaxis     Current Medications[2]    Patient Care Team:  Nidia Traore MD as PCP - General (Internal Medicine)         - The patient is given an After Visit Summary that lists all medications with directions, allergies, education, orders placed during this encounter and follow-up instructions.      - I have reviewed the patient's medical information including past medical, family, and social history sections including the medications and allergies.      - We discussed the patient's current medications.     This note was created by combination of typed  and MModal dictation.  Transcription errors may be present.  If there are any questions, please contact me.     This note was generated with the assistance of ambient listening technology. Verbal consent was obtained by the patient and accompanying visitor(s) for the recording of patient appointment to facilitate this note. I attest to having reviewed and edited the generated note for accuracy, though some syntax or spelling errors may persist. Please contact the author of this note for any clarification.          June Pemberton PA-C                      [1]   Social History  Socioeconomic History    Marital status: Single    Number of children: 2   Occupational History     Employer: [employer]   Tobacco Use    Smoking status: Never     Passive exposure: Never    Smokeless tobacco: Never    Substance and Sexual Activity    Alcohol use: Not Currently     Comment: occasional    Drug use: No    Sexual activity: Not Currently     Partners: Male     Birth control/protection: Abstinence, None     Social Drivers of Health     Financial Resource Strain: Low Risk  (1/1/2025)    Overall Financial Resource Strain (CARDIA)     Difficulty of Paying Living Expenses: Not hard at all   Food Insecurity: No Food Insecurity (1/1/2025)    Hunger Vital Sign     Worried About Running Out of Food in the Last Year: Never true     Ran Out of Food in the Last Year: Never true   Transportation Needs: Patient Declined (12/15/2023)    PRAPARE - Transportation     Lack of Transportation (Medical): Patient declined     Lack of Transportation (Non-Medical): Patient declined   Physical Activity: Unknown (1/1/2025)    Exercise Vital Sign     Days of Exercise per Week: 3 days   Stress: No Stress Concern Present (1/1/2025)    [employer] [employer] - Occupational Stress Questionnaire     Feeling of Stress : Not at all   Housing Stability: Unknown (1/1/2025)    Housing Stability Vital Sign     Unable to Pay for Housing in the Last Year: No   [2]   Current Outpatient Medications   Medication Sig Dispense Refill    acetaminophen 325 mg Cap       ergocalciferol (ERGOCALCIFEROL) 50,000 unit Cap Take 1 capsule (50,000 Units total) by mouth every 7 days. 51 capsule 0    ibuprofen (ADVIL,MOTRIN) 800 MG tablet Take 1 tablet (800 mg total) by mouth 2 (two) times daily as needed for Pain. 60 tablet 2     No current facility-administered medications for this visit.

## 2025-05-16 ENCOUNTER — PATIENT MESSAGE (OUTPATIENT)
Dept: FAMILY MEDICINE | Facility: CLINIC | Age: 45
End: 2025-05-16
Payer: COMMERCIAL

## 2025-05-19 ENCOUNTER — OFFICE VISIT (OUTPATIENT)
Dept: FAMILY MEDICINE | Facility: CLINIC | Age: 45
End: 2025-05-19
Payer: COMMERCIAL

## 2025-05-19 VITALS
BODY MASS INDEX: 34.24 KG/M2 | DIASTOLIC BLOOD PRESSURE: 70 MMHG | HEART RATE: 80 BPM | OXYGEN SATURATION: 99 % | WEIGHT: 186.06 LBS | HEIGHT: 62 IN | TEMPERATURE: 98 F | SYSTOLIC BLOOD PRESSURE: 118 MMHG | RESPIRATION RATE: 16 BRPM

## 2025-05-19 DIAGNOSIS — Z09 FOLLOW-UP EXAM: Primary | ICD-10-CM

## 2025-05-19 DIAGNOSIS — S93.409S SPRAIN OF ANKLE, UNSPECIFIED LATERALITY, UNSPECIFIED LIGAMENT, SEQUELA: ICD-10-CM

## 2025-05-19 DIAGNOSIS — M54.9 BACK PAIN, UNSPECIFIED BACK LOCATION, UNSPECIFIED BACK PAIN LATERALITY, UNSPECIFIED CHRONICITY: ICD-10-CM

## 2025-05-19 PROBLEM — S93.409A SPRAIN OF ANKLE: Status: ACTIVE | Noted: 2025-05-19

## 2025-05-19 PROCEDURE — 3074F SYST BP LT 130 MM HG: CPT | Mod: CPTII,S$GLB,,

## 2025-05-19 PROCEDURE — 3044F HG A1C LEVEL LT 7.0%: CPT | Mod: CPTII,S$GLB,,

## 2025-05-19 PROCEDURE — 3078F DIAST BP <80 MM HG: CPT | Mod: CPTII,S$GLB,,

## 2025-05-19 PROCEDURE — 96372 THER/PROPH/DIAG INJ SC/IM: CPT | Mod: S$GLB,,,

## 2025-05-19 PROCEDURE — 99214 OFFICE O/P EST MOD 30 MIN: CPT | Mod: 25,S$GLB,,

## 2025-05-19 PROCEDURE — 1159F MED LIST DOCD IN RCRD: CPT | Mod: CPTII,S$GLB,,

## 2025-05-19 PROCEDURE — 99999 PR PBB SHADOW E&M-EST. PATIENT-LVL III: CPT | Mod: PBBFAC,,,

## 2025-05-19 PROCEDURE — 3008F BODY MASS INDEX DOCD: CPT | Mod: CPTII,S$GLB,,

## 2025-05-19 RX ORDER — DEXAMETHASONE SODIUM PHOSPHATE 10 MG/ML
10 INJECTION INTRAMUSCULAR; INTRAVENOUS
Status: COMPLETED | OUTPATIENT
Start: 2025-05-19 | End: 2025-05-19

## 2025-05-19 RX ADMIN — DEXAMETHASONE SODIUM PHOSPHATE 10 MG: 10 INJECTION INTRAMUSCULAR; INTRAVENOUS at 09:05

## 2025-05-19 NOTE — PROGRESS NOTES
bhavin  HPI     Chief Complaint:  Chief Complaint   Patient presents with    Follow-up       Umer Rodriguez is a 44 y.o. female with multiple medical diagnoses as listed in the medical history and problem list that presents for Follow-up    HPI    History of Present Illness    CHIEF COMPLAINT:  Umer presents today for back pain with current pain level of 3/10    BACK PAIN:  She reports back pain peaked at 8/10 severity last Thursday and Friday, requiring her to miss work from Tuesday through Friday. Current pain level is 3/10. Her work involves heavy lifting of  lbs. She reports previous steroid injection provided significant pain relief. She currently uses muscle relaxers from a previous prescription and finds a heating pad helpful for pain management.    MUSCULOSKELETAL:  She is currently attending physical therapy for a work-related twisted ankle. She reports performing prescribed stretches on Wednesday and Thursday but acknowledges inconsistent adherence to the regimen.    PRE-OPERATIVE STATUS:  She is scheduled for breast reduction surgery next Tuesday. She discontinued all medications 30 days prior to surgery. Blood work is scheduled for the 21st in preparation for the procedure.      ROS:  General: -fever, -chills, -fatigue, -weight gain, -weight loss, +sleep disturbances  Eyes: -vision changes, -redness, -discharge  ENT: -ear pain, -nasal congestion, -sore throat  Cardiovascular: -chest pain, -palpitations, -lower extremity edema  Respiratory: -cough, -shortness of breath  Gastrointestinal: -abdominal pain, -nausea, -vomiting, -diarrhea, -constipation, -blood in stool  Genitourinary: -dysuria, -hematuria, -frequency  Musculoskeletal: -joint pain, -muscle pain, +back pain, +limb pain, +pain with movement, +lower extremity pain with movement, +muscle weakness  Skin: -rash, -lesion  Neurological: -headache, -dizziness, -numbness, -tingling  Psychiatric: -anxiety, -depression, -sleep difficulty            2025  my note    Chief Complaint   Patient presents with    Back Pain       Lower back right side         Umer Rodriguez is a 44 y.o. female with multiple medical diagnoses as listed in the medical history and problem list that presents for Back pain right side     HPI     History of Present Illness    CHIEF COMPLAINT:  Umer presents today for back pain     HISTORY OF PRESENT ILLNESS:  She presents with right-sided back pain that began over 1 week ago.  States she woke up with her son's knee in her back.  Pain is rated as 8-9/10 and is described as a pulling sensation, particularly noticeable when walking. She also reports right arm pain that began last night after sleeping on her right side, also described as a pulling sensation. Both back and arm pain have disrupted her sleep. She works in an occupation requiring lifting activities.     MEDICATIONS:  She currently takes methocarbamol (Robaxin) 750mg since April as needed due to ankle sprain. Trial of naproxen provided no pain relief.     MEDICAL HISTORY:  History of work-related left ankle injury from rolling the ankle.     FAMILY HISTORY:  Father  due to cancer with spinal mass.    Assessment & Plan     Assessment & Plan    Administered dexamethasone 10 mg injection for back pain relief, considering upcoming surgery in 8 days (pending surgeon approval).  Determined steroid injection should not interfere with scheduled surgery.  Sent message to Dr. Lugo to confirm appropriateness of steroid injection before upcoming surgery.  Considered current pain level (3/10) and recent higher pain levels (8/10).  Evaluated current stretching routine and its effectiveness.          Problem List Items Addressed This Visit       Back pain    Relevant Medications    dexAMETHasone injection 10 mg (Completed)  LOW BACK PAIN:  - Umer's back pain has improved from level 8 last Thursday/Friday to current level 3, though still experiences discomfort  and achiness, especially upon waking.  - Pain worsens with heavy lifting at work.  - Administered dexamethasone 10 mg injection to reduce inflammation, considering the upcoming breast reduction surgery in 8 days (pending surgeon approval).  - Instructed patient to continue prescribed stretches, which they acknowledge have been performed inconsistently.  - Advised on proper sleep positioning, including use of body pillow for side sleeping and avoiding sleeping with hands above head due to potential joint issues.  - Recommend heating pad as needed for pain relief.  - Will complete FMLA paperwork for recent time off work (4 days) due to back pain.  Patient instructed to noted what she needs on the paperwork to be completed.    Follow-up exam - Primary  BREAST HYPERTROPHY:  - Umer is scheduled for breast reduction surgery next week and is excited about the procedure.  - Discussed how the surgery should help alleviate back pain.  - Will refer to physical therapy to begin after the breast reduction surgery is completed.    FOLLOW-UP:  - Umer to bring FMLA paperwork to office for completion.  - Contact office if any issues arise before next appointment.     Sprain of ankle    ANKLE SPRAIN:  - Umer twisted ankle at work and is currently attending physical therapy sessions.  - Right ankle remains weaker compared to left ankle.  - Will continue to monitor progress.           --------------------------------------------      Health Maintenance:  Health Maintenance         Date Due Completion Date    COVID-19 Vaccine (4 - 2024-25 season) 09/01/2024 11/8/2021    Influenza Vaccine (Season Ended) 09/01/2025 4/8/2024    Mammogram 10/24/2025 10/24/2024    Hemoglobin A1c (Prediabetes) 04/14/2026 4/14/2025    TETANUS VACCINE 10/24/2028 10/24/2018    Lipid Panel 04/14/2030 4/14/2025    Colorectal Cancer Screening 10/23/2034 10/23/2024    RSV Vaccine (Age 60+ and Pregnant patients) (1 - 1-dose 75+ series) 05/22/2054 ---       "      Health maintenance reviewed and Advised patient on the importance of completing overdue health maintenance items    Follow Up:  Follow up if symptoms worsen or fail to improve.    Exam     Review of Systems:  (as noted above)  Review of Systems    Physical Exam:   Physical Exam  Vitals:    25 0842   BP: 118/70   BP Location: Right arm   Patient Position: Sitting   Pulse: 80   Resp: 16   Temp: 98 °F (36.7 °C)   TempSrc: Oral   SpO2: 99%   Weight: 84.4 kg (186 lb 1.1 oz)   Height: 5' 2" (1.575 m)      Body mass index is 34.03 kg/m².    Physical Exam    General: No acute distress. Well-developed. Well-nourished.  Eyes: EOMI. Sclerae anicteric.  HENT: Normocephalic. Atraumatic. Nares patent.   Cardiovascular: Regular rate. Regular rhythm. No murmurs. No rubs. No gallops. Normal S1, S2.  Respiratory: Normal respiratory effort. Clear to auscultation bilaterally. No rales. No rhonchi. No wheezing.  Musculoskeletal: No  obvious deformity.  Extremities: No lower extremity edema.  Neurological: Alert & oriented x3. No slurred speech. Normal gait.  Psychiatric: Normal mood. Normal affect. Good insight. Good judgment.  Skin: Warm. Dry. No rash.           History     Past Medical History:  Past Medical History:   Diagnosis Date    Allergy     Dysmenorrhea 2024    Fibroids     ARNOL (iron deficiency anemia) 2014    Menorrhagia with regular cycle 10/24/2024    Painful orthopaedic hardware 2016    Short toes 2016    Toe contracture 2016       Past Surgical History:  Past Surgical History:   Procedure Laterality Date     SECTION  ,     COLONOSCOPY N/A 10/23/2024    Procedure: COLONOSCOPY;  Surgeon: Keila Seo MD;  Location: [facility] ENDO;  Service: Endoscopy;  Laterality: N/A;  Referral:  Svitlana Becker MD / suprep / inst portal - LW  10/14/24- case adj. 15 min, lvm/portal for pc. DBM  10/17/24- pc complete. DBM  10/18/24- r/s, instr to portal. DBM  10/22 pt confirmed " SW    CYSTOSCOPY N/A 02/20/2019    Procedure: CYSTOSCOPY;  Surgeon: Pollo Park MD;  Location: [facility] OR [address] [place] [address];  Service: Urology;  Laterality: N/A;  30 min/2nd floor OR    FOOT SURGERY Right     HYSTERECTOMY, TOTAL, LAPAROSCOPIC, WITH SALPINGECTOMY N/A 11/14/2024    Procedure: HYSTERECTOMY,TOTAL,LAPAROSCOPIC,WITH SALPINGECTOMY;  Surgeon: Nanette Timmons MD;  Location: [facility] OR;  Service: OB/GYN;  Laterality: N/A;  RN PREOP 11/7/2024   BLOOD REFUSAL SIGNED----UPT ON ARRIVAL    INJECTION OF BOTULINUM TOXIN TYPE A N/A 02/20/2019    Procedure: INJECTION, BOTULINUM TOXIN, TYPE A;  Surgeon: Pollo Park MD;  [facility];  Service: Urology;  Laterality: N/A;       Social History:  Social History[1]    Family History:  Family History   Problem Relation Name Age of Onset    Hypertension Mother Umer Rodriguez     Rectal cancer Father Natan Rodriguez     Cancer Father Natan Rodriguez     No Known Problems Sister      Breast cancer Neg Hx      Colon cancer Neg Hx      Ovarian cancer Neg Hx      Hyperlipidemia Neg Hx      Heart disease Neg Hx      Diabetes Neg Hx      Arthritis Neg Hx         Allergies and Medications: (updated and reviewed)  Review of patient's allergies indicates:   Allergen Reactions    Oxycodone Anaphylaxis     Current Medications[2]    Patient Care Team:  Nidia Traore MD as PCP - General (Internal Medicine)         - The patient is given an After Visit Summary that lists all medications with directions, allergies, education, orders placed during this encounter and follow-up instructions.      - I have reviewed the patient's medical information including past medical, family, and social history sections including the medications and allergies.      - We discussed the patient's current medications.     This note was created by combination of typed  and MModal dictation.  Transcription errors may be present.  If there are any questions, please contact me.     This note was generated  with the assistance of ambient listening technology. Verbal consent was obtained by the patient and accompanying visitor(s) for the recording of patient appointment to facilitate this note. I attest to having reviewed and edited the generated note for accuracy, though some syntax or spelling errors may persist. Please contact the author of this note for any clarification.          June Pemberton PA-C                      [1]   Social History  Socioeconomic History    Marital status: Single    Number of children: 2   Occupational History     Employer: [employer]   Tobacco Use    Smoking status: Never     Passive exposure: Never    Smokeless tobacco: Never   Substance and Sexual Activity    Alcohol use: Not Currently     Comment: occasional    Drug use: No    Sexual activity: Not Currently     Partners: Male     Birth control/protection: Abstinence, None     Social Drivers of Health     Financial Resource Strain: Low Risk  (1/1/2025)    Overall Financial Resource Strain (CARDIA)     Difficulty of Paying Living Expenses: Not hard at all   Food Insecurity: No Food Insecurity (1/1/2025)    Hunger Vital Sign     Worried About Running Out of Food in the Last Year: Never true     Ran Out of Food in the Last Year: Never true   Transportation Needs: Patient Declined (12/15/2023)    PRAPARE - Transportation     Lack of Transportation (Medical): Patient declined     Lack of Transportation (Non-Medical): Patient declined   Physical Activity: Unknown (1/1/2025)    Exercise Vital Sign     Days of Exercise per Week: 3 days   Stress: No Stress Concern Present (1/1/2025)    [employer] [place] [employer] - Occupational Stress Questionnaire     Feeling of Stress : Not at all   Housing Stability: Unknown (1/1/2025)    Housing Stability Vital Sign     Unable to Pay for Housing in the Last Year: No   [2]   Current Outpatient Medications   Medication Sig Dispense Refill    acetaminophen 325 mg Cap        ergocalciferol (ERGOCALCIFEROL) 50,000 unit Cap Take 1 capsule (50,000 Units total) by mouth every 7 days. 51 capsule 0    ibuprofen (ADVIL,MOTRIN) 800 MG tablet Take 1 tablet (800 mg total) by mouth 2 (two) times daily as needed for Pain. 60 tablet 2     No current facility-administered medications for this visit.

## 2025-05-21 ENCOUNTER — OFFICE VISIT (OUTPATIENT)
Dept: INTERNAL MEDICINE | Facility: CLINIC | Age: 45
End: 2025-05-21
Payer: COMMERCIAL

## 2025-05-21 ENCOUNTER — LAB VISIT (OUTPATIENT)
Dept: LAB | Facility: HOSPITAL | Age: 45
End: 2025-05-21
Attending: ANESTHESIOLOGY
Payer: COMMERCIAL

## 2025-05-21 ENCOUNTER — RESULTS FOLLOW-UP (OUTPATIENT)
Dept: OBSTETRICS AND GYNECOLOGY | Facility: CLINIC | Age: 45
End: 2025-05-21

## 2025-05-21 VITALS
DIASTOLIC BLOOD PRESSURE: 83 MMHG | OXYGEN SATURATION: 98 % | BODY MASS INDEX: 34.67 KG/M2 | HEIGHT: 62 IN | HEART RATE: 66 BPM | TEMPERATURE: 98 F | WEIGHT: 188.38 LBS | SYSTOLIC BLOOD PRESSURE: 135 MMHG

## 2025-05-21 DIAGNOSIS — N62 HYPERTROPHY OF BREAST: ICD-10-CM

## 2025-05-21 DIAGNOSIS — M54.9 BACK PAIN, UNSPECIFIED BACK LOCATION, UNSPECIFIED BACK PAIN LATERALITY, UNSPECIFIED CHRONICITY: ICD-10-CM

## 2025-05-21 DIAGNOSIS — E66.811 CLASS 1 OBESITY WITHOUT SERIOUS COMORBIDITY WITH BODY MASS INDEX (BMI) OF 34.0 TO 34.9 IN ADULT, UNSPECIFIED OBESITY TYPE: ICD-10-CM

## 2025-05-21 DIAGNOSIS — R06.83 SNORING: ICD-10-CM

## 2025-05-21 DIAGNOSIS — Z01.818 PREOP TESTING: ICD-10-CM

## 2025-05-21 DIAGNOSIS — Z01.818 PREOPERATIVE EXAMINATION: Primary | ICD-10-CM

## 2025-05-21 LAB
ANION GAP (OHS): 10 MMOL/L (ref 8–16)
BUN SERPL-MCNC: 14 MG/DL (ref 6–20)
CALCIUM SERPL-MCNC: 8.6 MG/DL (ref 8.7–10.5)
CHLORIDE SERPL-SCNC: 107 MMOL/L (ref 95–110)
CO2 SERPL-SCNC: 26 MMOL/L (ref 23–29)
CREAT SERPL-MCNC: 0.8 MG/DL (ref 0.5–1.4)
ERYTHROCYTE [DISTWIDTH] IN BLOOD BY AUTOMATED COUNT: 14.4 % (ref 11.5–14.5)
GFR SERPLBLD CREATININE-BSD FMLA CKD-EPI: >60 ML/MIN/1.73/M2
GLUCOSE SERPL-MCNC: 93 MG/DL (ref 70–110)
HCT VFR BLD AUTO: 43.8 % (ref 37–48.5)
HGB BLD-MCNC: 13.7 GM/DL (ref 12–16)
MCH RBC QN AUTO: 28.6 PG (ref 27–31)
MCHC RBC AUTO-ENTMCNC: 31.3 G/DL (ref 32–36)
MCV RBC AUTO: 91 FL (ref 82–98)
PLATELET # BLD AUTO: 237 K/UL (ref 150–450)
PMV BLD AUTO: 11.2 FL (ref 9.2–12.9)
POTASSIUM SERPL-SCNC: 4.3 MMOL/L (ref 3.5–5.1)
RBC # BLD AUTO: 4.79 M/UL (ref 4–5.4)
SODIUM SERPL-SCNC: 143 MMOL/L (ref 136–145)
WBC # BLD AUTO: 8.08 K/UL (ref 3.9–12.7)

## 2025-05-21 PROCEDURE — 1159F MED LIST DOCD IN RCRD: CPT | Mod: CPTII,S$GLB,, | Performed by: NURSE PRACTITIONER

## 2025-05-21 PROCEDURE — 3044F HG A1C LEVEL LT 7.0%: CPT | Mod: CPTII,S$GLB,, | Performed by: NURSE PRACTITIONER

## 2025-05-21 PROCEDURE — 99999 PR PBB SHADOW E&M-EST. PATIENT-LVL III: CPT | Mod: PBBFAC,,, | Performed by: NURSE PRACTITIONER

## 2025-05-21 PROCEDURE — 99215 OFFICE O/P EST HI 40 MIN: CPT | Mod: S$GLB,,, | Performed by: NURSE PRACTITIONER

## 2025-05-21 PROCEDURE — 85027 COMPLETE CBC AUTOMATED: CPT

## 2025-05-21 PROCEDURE — 36415 COLL VENOUS BLD VENIPUNCTURE: CPT

## 2025-05-21 PROCEDURE — 3079F DIAST BP 80-89 MM HG: CPT | Mod: CPTII,S$GLB,, | Performed by: NURSE PRACTITIONER

## 2025-05-21 PROCEDURE — 1160F RVW MEDS BY RX/DR IN RCRD: CPT | Mod: CPTII,S$GLB,, | Performed by: NURSE PRACTITIONER

## 2025-05-21 PROCEDURE — 82374 ASSAY BLOOD CARBON DIOXIDE: CPT

## 2025-05-21 PROCEDURE — 3008F BODY MASS INDEX DOCD: CPT | Mod: CPTII,S$GLB,, | Performed by: NURSE PRACTITIONER

## 2025-05-21 PROCEDURE — 3075F SYST BP GE 130 - 139MM HG: CPT | Mod: CPTII,S$GLB,, | Performed by: NURSE PRACTITIONER

## 2025-05-21 NOTE — HPI
This is a 44 y.o. female  who presents today for a preoperative evaluation in preparation for bilateral breast reduction/liposuction, and abdominoplasty.  Surgery is indicated for  hypertrophy of breast.   Patient is new to me.  The history has been obtained by speaking with the patient and reviewing the electronic medical record and/or outside health information. Significant health conditions for the perioperative period are discussed below in assessment and plan.   Patient reports current health status to be Good.  Denies any new symptoms before surgery.

## 2025-05-21 NOTE — ASSESSMENT & PLAN NOTE
Denies JOEY. Possible sleep apnea: recommend caution with sedating medication in the perioperative period.

## 2025-05-21 NOTE — OUTPATIENT SUBJECTIVE & OBJECTIVE
Outpatient Subjective & Objective      Chief Complaint: Preoperative evaulation, perioperative medical management, and complication reduction plan.     Functional Capacity: walked to POC without CP/SOB.       Anesthesia issues: None    Difficulty mouth opening: No    Steroid use in the last 12 months:  No    Dental Issues: None    Family anesthesia difficulty: None     Family Hx of Thrombosis: None      Past Medical History:   Diagnosis Date    Allergy     Dysmenorrhea 2024    Fibroids     ARNOL (iron deficiency anemia) 2014    Menorrhagia with regular cycle 10/24/2024    Painful orthopaedic hardware 2016    Short toes 2016    Toe contracture 2016         Past Medical History Pertinent Negatives:   Diagnosis Date Noted    Anxiety 2025    Asthma 2013    Asthma 2025    Complication of anesthesia 2013    COPD (chronic obstructive pulmonary disease) 2025    Coronary artery disease 2025    Deep vein thrombosis 2025    Depression 2025    Diabetes mellitus 2013    Diabetes mellitus, type 2 2025    Disorder of kidney and ureter 2025    GERD (gastroesophageal reflux disease) 2025    Hyperlipidemia 2025    Hypertension 2025    Infertility 2013    Postpartum depression 2013    Pulmonary embolism 2025    Seizures 2025    Sickle cell anemia 2013    Stroke 2025    Thyroid disease 2025         Past Surgical History:   Procedure Laterality Date     SECTION  2013    COLONOSCOPY N/A 10/23/2024    Procedure: COLONOSCOPY;  Surgeon: Keila Seo MD;  Location: [facility];  Service: Endoscopy;  Laterality: N/A;  Referral:  Svitlana Becker MD / suprep / inst portal - LW  10/14/24- case adj. 15 min, lvm/portal for pc. DBM  10/17/24- pc complete. DBM  10/18/24- r/s, instr to portal. DBM  10/22 pt confirmed SW    CYSTOSCOPY N/A 2019    Procedure: CYSTOSCOPY;   "Surgeon: Pollo Park MD;  Location: [facility] OR [address] [place] [address];  Service: Urology;  Laterality: N/A;  30 min/2nd floor OR    FOOT SURGERY Right     HYSTERECTOMY, TOTAL, LAPAROSCOPIC, WITH SALPINGECTOMY N/A 11/14/2024    Procedure: HYSTERECTOMY,TOTAL,LAPAROSCOPIC,WITH SALPINGECTOMY;  Surgeon: Nanette Timmons MD;  Location: [facility] OR;  Service: OB/GYN;  Laterality: N/A;  RN PREOP 11/7/2024   BLOOD REFUSAL SIGNED----UPT ON ARRIVAL    INJECTION OF BOTULINUM TOXIN TYPE A N/A 02/20/2019    Procedure: INJECTION, BOTULINUM TOXIN, TYPE A;  Surgeon: Pollo Park MD;  Location: [facility] OR [address] [place] [address];  Service: Urology;  Laterality: N/A;       Review of Systems   Constitutional:  Negative for chills, fatigue, fever and unexpected weight change.   HENT:  Negative for congestion, hearing loss, rhinorrhea, sore throat, tinnitus and trouble swallowing.    Eyes:  Negative for visual disturbance.   Respiratory:  Negative for cough, chest tightness, shortness of breath and wheezing.         STOP BANG risk factors:  Snoring   Cardiovascular:  Negative for chest pain, palpitations and leg swelling.   Gastrointestinal:  Negative for constipation and diarrhea.        Denies Fatty liver, Hepatitis   Genitourinary:  Negative for decreased urine volume, difficulty urinating, dysuria, frequency, hematuria and urgency.   Musculoskeletal:  Positive for back pain. Negative for arthralgias, neck pain and neck stiffness.   Neurological:  Negative for dizziness, syncope, weakness, numbness and headaches.   Hematological:  Does not bruise/bleed easily.   Psychiatric/Behavioral:  Negative for sleep disturbance and suicidal ideas.               VITALS  Visit Vitals  /83 (BP Location: Right arm, Patient Position: Sitting)   Pulse 66   Temp 98.1 °F (36.7 °C) (Oral)   Ht 5' 2" (1.575 m)   Wt 85.5 kg (188 lb 6.1 oz)   LMP 10/21/2024 (Approximate)   SpO2 98%   BMI 34.46 kg/m²          Physical Exam  Vitals reviewed.   Constitutional:       General: She is not in " acute distress.     Appearance: She is well-developed. She is obese.   HENT:      Head: Normocephalic.      Nose: Nose normal.      Mouth/Throat:      Pharynx: No oropharyngeal exudate.   Eyes:      General:         Right eye: No discharge.         Left eye: No discharge.      Conjunctiva/sclera: Conjunctivae normal.      Pupils: Pupils are equal, round, and reactive to light.   Neck:      Thyroid: No thyromegaly.      Vascular: No carotid bruit or JVD.      Trachea: No tracheal deviation.   Cardiovascular:      Rate and Rhythm: Normal rate and regular rhythm.      Pulses:           Carotid pulses are 2+ on the right side and 2+ on the left side.       Dorsalis pedis pulses are 2+ on the right side and 2+ on the left side.        Posterior tibial pulses are 2+ on the right side and 2+ on the left side.      Heart sounds: Normal heart sounds. No murmur heard.  Pulmonary:      Effort: Pulmonary effort is normal. No respiratory distress.      Breath sounds: Normal breath sounds. No stridor. No wheezing, rhonchi or rales.   Abdominal:      General: Bowel sounds are normal. There is no distension.      Palpations: Abdomen is soft.      Tenderness: There is no abdominal tenderness. There is no guarding.   Musculoskeletal:      Cervical back: Normal range of motion. No pain with movement.      Right lower leg: No edema.      Left lower leg: No edema.   Lymphadenopathy:      Cervical: No cervical adenopathy.   Skin:     General: Skin is warm and dry.      Capillary Refill: Capillary refill takes less than 2 seconds.      Findings: No erythema or rash.   Neurological:      Mental Status: She is alert and oriented to person, place, and time.   Psychiatric:         Behavior: Behavior normal. Behavior is cooperative.          Significant Labs:  Lab Results   Component Value Date    WBC 8.08 05/21/2025    HGB 13.7 05/21/2025    HCT 43.8 05/21/2025     05/21/2025    CHOL 198 04/14/2025    TRIG 68 04/14/2025    HDL 45  04/14/2025    ALT 19 04/14/2025    AST 19 04/14/2025     05/21/2025    K 4.3 05/21/2025     05/21/2025    CREATININE 0.8 05/21/2025    BUN 14 05/21/2025    CO2 26 05/21/2025    TSH 3.624 10/31/2023    INR 1.0 10/28/2011    HGBA1C 5.6 04/14/2025           EKG:   Results for orders placed or performed during the hospital encounter of 11/14/16   EKG 12-lead    Collection Time: 11/14/16 12:50 PM    Narrative    Test Reason : Z01.818    Vent. Rate : 055 BPM     Atrial Rate : 055 BPM     P-R Int : 138 ms          QRS Dur : 074 ms      QT Int : 406 ms       P-R-T Axes : 059 064 052 degrees     QTc Int : 388 ms    Sinus bradycardia  Otherwise normal ECG  No previous ECGs available  Confirmed by Mykel Nieves MD ([id number]) on 11/15/2016 9:07:18 AM    Referred By: MARJORIE TRAVIS           Confirmed By:Mykel Nieves MD             Active Cardiac Conditions: None        Revised Cardiac Risk Index   High -Risk Surgery  Intraperitoneal; Intrathoracic; suprainguinal vascular Yes- + 1 No- 0   History of Ischemic Heart Disease   (Hx of MI/positive exercise test/current chest pain due to ischemia/use of nitrate therapy/EKG with pathological Q waves) Yes- + 1 No- 0   History of CHF  (Pulmonary edema/bilateral rales or S3 gallop/PND/CXR showing pulmonary vascular redistribution) Yes- + 1 No- 0   History of CVA   (Prior stroke or TIA) Yes- + 1 No- 0   Pre-operative treatment with insulin Yes- + 1 No- 0   Pre-operative creatinine > 2mg/dl Yes- + 1 No- 0   Total: 0      Risk Status:  Estimated risk of cardiac complications after non-cardiac surgery using the Revised Cardiac Risk Index for Preoperative risk is 3.9 %      ARISCAT (Canet) risk index: Low: 1.6% risk of post-op pulmonary complications.    American Society of Anesthesiologists Physical Status classification (ASA): 2             Outpatient Subjective & Objective

## 2025-05-21 NOTE — DISCHARGE INSTRUCTIONS
.Your surgery has been scheduled for:________5/27/25__________________________________    You should report to:  ____[facility], located on the [place] side of the first floor of the           [facility] ([phone])  __X__[facility], located on the [facility] side of the            Second floor of the [facility] ([phone])  ____3rd Floor [facility] located on the [facility] side of the [facility] [phone]  ____[facility]: located [facility] [address] [facility] [facility] [place] [address]. Building [address].     Please Note   Tell your doctor if you take Aspirin, products containing Aspirin, herbal medications  or blood thinners, such as Coumadin, Ticlid, or Plavix.  (Consult your provider regarding holding or stopping before surgery).  Arrange for someone to drive you home following surgery.  You will not be allowed to leave the surgical facility alone or drive yourself home following sedation and anesthesia.    Before Surgery  Stop taking all herbal medications, vitamins, and supplements 7 days prior to surgery  No Motrin/Advil (Ibuprofen) 7 days before surgery  No Aleve (Naproxen) 7 days before surgery   No Goody's/BC Powder 7 days before surgery  Refrain from drinking alcoholic beverages for 24 hours before and after surgery  Stop or limit smoking at least 24 hours prior to surgery  You may take Tylenol for pain    Night before Surgery  Do not eat or drink after midnight  Take a shower or bath (shower is recommended).  Bathe with Hibiclens soap or an antibacterial soap from the neck down.  If not supplied by your surgeon, hibiclens soap will need to be purchased over the counter in pharmacy.  Rinse soap off thoroughly.  Shampoo your hair with your regular shampoo    The Day of Surgery  Take another bath or shower with hibiclens or any antibacterial soap, to reduce the chance of infection.  Take heart  and blood pressure medications with a small sip of water, as advised by the perioperative team.  Do not take fluid pills  You may brush your teeth and rinse your mouth, but do not swallow any additional water.   Do not apply perfumes, powder, body lotions or deodorant on the day of surgery.  Nail polish should be removed.  Do not wear makeup or moisturizer  Wear comfortable clothes, such as a button front shirt and loose fitting pants.  Leave all jewelry, including body piercings, and valuables at home.    Bring any devices you will need after surgery such as crutches or canes.  If you have sleep apnea, please bring your CPAP machine  In the event that your physical condition changes including the onset of a cold or respiratory illness, or if you have to delay or cancel your surgery, please notify your surgeon.

## 2025-05-21 NOTE — PROGRESS NOTES
[facility]  Progress Note    Patient Name: Umer Rodriguez  MRN: [id number]  Date of Evaluation- 05/21/2025  PCP- Nidia Traore MD    Future cases for Umer Rodriguez [[id number]]       Case ID Status Date Time Diogo Procedure Provider Location    [id number] [facility] 5/27/2025  7:30  MAMMOPLASTY, REDUCTION, BILATERAL Hermes Lugo MD [[id number]] NOM OR 2ND FLR            HPI:  This is a 44 y.o. female  who presents today for a preoperative evaluation in preparation for bilateral breast reduction/liposuction, and abdominoplasty.  Surgery is indicated for  hypertrophy of breast.   Patient is new to me.  The history has been obtained by speaking with the patient and reviewing the electronic medical record and/or outside health information. Significant health conditions for the perioperative period are discussed below in assessment and plan.   Patient reports current health status to be Good.  Denies any new symptoms before surgery.       Subjective/ Objective:     Chief Complaint: Preoperative evaulation, perioperative medical management, and complication reduction plan.     Functional Capacity: walked to POC without CP/SOB.       Anesthesia issues: None    Difficulty mouth opening: No    Steroid use in the last 12 months:  No    Dental Issues: None    Family anesthesia difficulty: None     Family Hx of Thrombosis: None      Past Medical History:   Diagnosis Date    Allergy     Dysmenorrhea 11/14/2024    Fibroids     ARNOL (iron deficiency anemia) 01/21/2014    Menorrhagia with regular cycle 10/24/2024    Painful orthopaedic hardware 12/07/2016    Short toes 12/07/2016    Toe contracture 12/07/2016         Past Medical History Pertinent Negatives:   Diagnosis Date Noted    Anxiety 05/21/2025    Asthma 01/18/2013    Asthma 05/21/2025    Complication of anesthesia 01/18/2013    COPD (chronic obstructive pulmonary disease) 05/21/2025    Coronary artery disease 05/21/2025    Deep vein thrombosis  2025    Depression 2025    Diabetes mellitus 2013    Diabetes mellitus, type 2 2025    Disorder of kidney and ureter 2025    GERD (gastroesophageal reflux disease) 2025    Hyperlipidemia 2025    Hypertension 2025    Infertility 2013    Postpartum depression 2013    Pulmonary embolism 2025    Seizures 2025    Sickle cell anemia 2013    Stroke 2025    Thyroid disease 2025         Past Surgical History:   Procedure Laterality Date     SECTION  2013    COLONOSCOPY N/A 10/23/2024    Procedure: COLONOSCOPY;  Surgeon: Keila Seo MD;  Location: [facility] ENDO;  Service: Endoscopy;  Laterality: N/A;  Referral:  Svitlana Becker MD / suprep / inst portal - LW  10/14/24- case adj. 15 min, lvm/portal for pc. DBM  10/17/24- pc complete. DBM  10/18/24- r/s, instr to portal. [facility]  10/22 pt confirmed SW    CYSTOSCOPY N/A 2019    Procedure: CYSTOSCOPY;  Surgeon: Pollo Park MD;  [facility];  Service: Urology;  Laterality: N/A;  30 min/2nd floor OR    FOOT SURGERY Right     HYSTERECTOMY, TOTAL, LAPAROSCOPIC, WITH SALPINGECTOMY N/A 2024    Procedure: HYSTERECTOMY,TOTAL,LAPAROSCOPIC,WITH SALPINGECTOMY;  Surgeon: Nanette Timmons MD;  Location: [facility] OR;  Service: OB/GYN;  Laterality: N/A;  RN PREOP 2024   BLOOD REFUSAL SIGNED----UPT ON ARRIVAL    INJECTION OF BOTULINUM TOXIN TYPE A N/A 2019    Procedure: INJECTION, BOTULINUM TOXIN, TYPE A;  Surgeon: Pollo Park MD;  [facility];  Service: Urology;  Laterality: N/A;       Review of Systems   Constitutional:  Negative for chills, fatigue, fever and unexpected weight change.   HENT:  Negative for congestion, hearing loss, rhinorrhea, sore throat, tinnitus and trouble swallowing.    Eyes:  Negative for visual disturbance.   Respiratory:  Negative for cough, chest tightness, shortness of breath and wheezing.         STOP BANG  "risk factors:  Snoring   Cardiovascular:  Negative for chest pain, palpitations and leg swelling.   Gastrointestinal:  Negative for constipation and diarrhea.        Denies Fatty liver, Hepatitis   Genitourinary:  Negative for decreased urine volume, difficulty urinating, dysuria, frequency, hematuria and urgency.   Musculoskeletal:  Positive for back pain. Negative for arthralgias, neck pain and neck stiffness.   Neurological:  Negative for dizziness, syncope, weakness, numbness and headaches.   Hematological:  Does not bruise/bleed easily.   Psychiatric/Behavioral:  Negative for sleep disturbance and suicidal ideas.               VITALS  Visit Vitals  /83 (BP Location: Right arm, Patient Position: Sitting)   Pulse 66   Temp 98.1 °F (36.7 °C) (Oral)   Ht 5' 2" (1.575 m)   Wt 85.5 kg (188 lb 6.1 oz)   LMP 10/21/2024 (Approximate)   SpO2 98%   BMI 34.46 kg/m²          Physical Exam  Vitals reviewed.   Constitutional:       General: She is not in acute distress.     Appearance: She is well-developed. She is obese.   HENT:      Head: Normocephalic.      Nose: Nose normal.      Mouth/Throat:      Pharynx: No oropharyngeal exudate.   Eyes:      General:         Right eye: No discharge.         Left eye: No discharge.      Conjunctiva/sclera: Conjunctivae normal.      Pupils: Pupils are equal, round, and reactive to light.   Neck:      Thyroid: No thyromegaly.      Vascular: No carotid bruit or JVD.      Trachea: No tracheal deviation.   Cardiovascular:      Rate and Rhythm: Normal rate and regular rhythm.      Pulses:           Carotid pulses are 2+ on the right side and 2+ on the left side.       Dorsalis pedis pulses are 2+ on the right side and 2+ on the left side.        Posterior tibial pulses are 2+ on the right side and 2+ on the left side.      Heart sounds: Normal heart sounds. No murmur heard.  Pulmonary:      Effort: Pulmonary effort is normal. No respiratory distress.      Breath sounds: Normal breath " sounds. No stridor. No wheezing, rhonchi or rales.   Abdominal:      General: Bowel sounds are normal. There is no distension.      Palpations: Abdomen is soft.      Tenderness: There is no abdominal tenderness. There is no guarding.   Musculoskeletal:      Cervical back: Normal range of motion. No pain with movement.      Right lower leg: No edema.      Left lower leg: No edema.   Lymphadenopathy:      Cervical: No cervical adenopathy.   Skin:     General: Skin is warm and dry.      Capillary Refill: Capillary refill takes less than 2 seconds.      Findings: No erythema or rash.   Neurological:      Mental Status: She is alert and oriented to person, place, and time.   Psychiatric:         Behavior: Behavior normal. Behavior is cooperative.          Significant Labs:  Lab Results   Component Value Date    WBC 8.08 05/21/2025    HGB 13.7 05/21/2025    HCT 43.8 05/21/2025     05/21/2025    CHOL 198 04/14/2025    TRIG 68 04/14/2025    HDL 45 04/14/2025    ALT 19 04/14/2025    AST 19 04/14/2025     05/21/2025    K 4.3 05/21/2025     05/21/2025    CREATININE 0.8 05/21/2025    BUN 14 05/21/2025    CO2 26 05/21/2025    TSH 3.624 10/31/2023    INR 1.0 10/28/2011    HGBA1C 5.6 04/14/2025           EKG:   Results for orders placed or performed during the hospital encounter of 11/14/16   EKG 12-lead    Collection Time: 11/14/16 12:50 PM    Narrative    Test Reason : Z01.818    Vent. Rate : 055 BPM     Atrial Rate : 055 BPM     P-R Int : 138 ms          QRS Dur : 074 ms      QT Int : 406 ms       P-R-T Axes : 059 064 052 degrees     QTc Int : 388 ms    Sinus bradycardia  Otherwise normal ECG  No previous ECGs available  Confirmed by Mykel Nieves MD ([id number]) on 11/15/2016 9:07:18 AM    Referred By: MARJORIE TRAVIS           Confirmed By:Mykel Nieves MD             Active Cardiac Conditions: None        Revised Cardiac Risk Index   High -Risk Surgery  Intraperitoneal; Intrathoracic; suprainguinal vascular  Yes- + 1 No- 0   History of Ischemic Heart Disease   (Hx of MI/positive exercise test/current chest pain due to ischemia/use of nitrate therapy/EKG with pathological Q waves) Yes- + 1 No- 0   History of CHF  (Pulmonary edema/bilateral rales or S3 gallop/PND/CXR showing pulmonary vascular redistribution) Yes- + 1 No- 0   History of CVA   (Prior stroke or TIA) Yes- + 1 No- 0   Pre-operative treatment with insulin Yes- + 1 No- 0   Pre-operative creatinine > 2mg/dl Yes- + 1 No- 0   Total: 0      Risk Status:  Estimated risk of cardiac complications after non-cardiac surgery using the Revised Cardiac Risk Index for Preoperative risk is 3.9 %      ARISCAT (Canet) risk index: Low: 1.6% risk of post-op pulmonary complications.    American Society of Anesthesiologists Physical Status classification (ASA): 2                            Assessment/Plan:     Hypertrophy of breast  Scheduled with Dr. Lugo on 5/27/25 for bilateral breast reduction.  Also having done: liposuction and abdominoplasty.    Back pain  No loss of bladder or bowel control    Denies N/T to buttocks, perineum and inner surfaces of the thighs (saddle anesthesia)    S/P steroid shot on 5/19/25    Symptoms: pain radiating down leg:    Any N/T or  weakness:   Followed per Pain Management:  Seen by Spine/Neurosurgery:  Treated with:     Recent imaging:     Snoring  Denies JOEY. Possible sleep apnea: recommend caution with sedating medication in the perioperative period.       Class 1 obesity without serious comorbidity with body mass index (BMI) of 34.0 to 34.9 in adult  Lifestyle changes should be made by eating healthy, exercising at least 150 minutes weekly, and avoiding sedentary behavior.     Recommendations:   Stay active and exercise using cardio, stretching, and weights. Exercise at least 30 minutes 5x weekly. Try to get at least 7500-10,000 steps daily. Recommend Mediterranean diet and avoid fast food, decrease salt intake and carbohydrates such as  bread, potatoes, sugary snacks/sodas, cakes and cookies.        Discussion/Management of Perioperative Care    Thromboembolic prophylaxis (VTE) Care: Risk factors for thrombosis include: obesity and surgical procedure.  I recommend prophylaxis of thromboembolism with the use of compression stockings/pneumatic devices, and/or pharmacologic agents. The benefits should outweigh the risks for pharmacologic prophylaxis in the perioperative period. I also encourage early ambulation if not contraindicated during the post-operative period.    To reduce the risk of pulmonary complications, prophylactic recommendations include: incentive spirometry use/deep breathing, end tidal carbon dioxide monitoring, and pain control.    To reduce the risk of postoperative renal complications, I recommend the patient maintain adequate hydration.  Avoid/reduce NSAIDS and PEREYRA-2 inhibitors use as well as IV contrast for renal protection.    I recommend the use of appropriate prophylactic antibiotics to reduce the risk of surgical site infections.           This visit was focused on Preoperative evaluation, Perioperative Medical management, complication reduction plans. I suggest that the patient follows up with primary care or relevant sub specialists for ongoing health care.    I appreciate the opportunity to be involved in this patients care. Please feel free to contact me if there were any questions about this consultation.      I spent a total of 45 minutes on the day of the visit.  This includes face to face time and non-face to face time preparing to see the patient (e.g., review of tests), obtaining and/or reviewing separately obtained history, documenting clinical information in the electronic or other health record, independently interpreting results and communicating results to the patient/family/caregiver, or care coordinator.       Patient is optimized for surgery.  Buddhist- will bring advance directive card AM of  surgery.         Abbie Harris NP  Perioperative Medicine  [facility]

## 2025-05-21 NOTE — ASSESSMENT & PLAN NOTE
Scheduled with Dr. Lugo on 5/27/25 for bilateral breast reduction.  Also having done: liposuction and abdominoplasty.

## 2025-05-21 NOTE — ASSESSMENT & PLAN NOTE
No loss of bladder or bowel control    Denies N/T to buttocks, perineum and inner surfaces of the thighs (saddle anesthesia)    S/P steroid shot on 5/19/25    Symptoms: pain radiating down leg:    Any N/T or  weakness:   Followed per Pain Management:  Seen by Spine/Neurosurgery:  Treated with:     Recent imaging:

## 2025-05-23 ENCOUNTER — PATIENT MESSAGE (OUTPATIENT)
Facility: CLINIC | Age: 45
End: 2025-05-23
Payer: COMMERCIAL

## 2025-05-23 ENCOUNTER — PATIENT MESSAGE (OUTPATIENT)
Dept: PLASTIC SURGERY | Facility: CLINIC | Age: 45
End: 2025-05-23
Payer: COMMERCIAL

## 2025-05-27 ENCOUNTER — ANESTHESIA EVENT (OUTPATIENT)
Dept: SURGERY | Facility: HOSPITAL | Age: 45
End: 2025-05-27
Payer: COMMERCIAL

## 2025-05-27 ENCOUNTER — HOSPITAL ENCOUNTER (OUTPATIENT)
Facility: HOSPITAL | Age: 45
Discharge: HOME | End: 2025-05-27
Attending: SURGERY | Admitting: SURGERY
Payer: COMMERCIAL

## 2025-05-27 ENCOUNTER — ANESTHESIA (OUTPATIENT)
Dept: SURGERY | Facility: HOSPITAL | Age: 45
End: 2025-05-27
Payer: COMMERCIAL

## 2025-05-27 VITALS
TEMPERATURE: 98 F | HEIGHT: 62 IN | BODY MASS INDEX: 34.6 KG/M2 | RESPIRATION RATE: 20 BRPM | OXYGEN SATURATION: 97 % | DIASTOLIC BLOOD PRESSURE: 62 MMHG | HEART RATE: 87 BPM | SYSTOLIC BLOOD PRESSURE: 104 MMHG | WEIGHT: 188 LBS

## 2025-05-27 DIAGNOSIS — M54.2 CERVICALGIA: ICD-10-CM

## 2025-05-27 DIAGNOSIS — L30.4 ERYTHEMA INTERTRIGO: ICD-10-CM

## 2025-05-27 DIAGNOSIS — M25.511 CHRONIC PAIN IN RIGHT SHOULDER: ICD-10-CM

## 2025-05-27 DIAGNOSIS — G89.29 CHRONIC PAIN IN RIGHT SHOULDER: ICD-10-CM

## 2025-05-27 DIAGNOSIS — G89.29 CHRONIC PAIN IN LEFT SHOULDER: ICD-10-CM

## 2025-05-27 DIAGNOSIS — N62 HYPERTROPHY OF BREAST: ICD-10-CM

## 2025-05-27 DIAGNOSIS — M25.512 CHRONIC PAIN IN LEFT SHOULDER: ICD-10-CM

## 2025-05-27 DIAGNOSIS — N62 MACROMASTIA: Primary | ICD-10-CM

## 2025-05-27 DIAGNOSIS — Z01.818 PREOP TESTING: ICD-10-CM

## 2025-05-27 PROCEDURE — 63600175 PHARM REV CODE 636 W HCPCS: Performed by: SURGERY

## 2025-05-27 PROCEDURE — 63600175 PHARM REV CODE 636 W HCPCS: Performed by: ANESTHESIOLOGY

## 2025-05-27 PROCEDURE — 25000003 PHARM REV CODE 250

## 2025-05-27 PROCEDURE — 37000009 HC ANESTHESIA EA ADD 15 MINS: Performed by: SURGERY

## 2025-05-27 PROCEDURE — 37000008 HC ANESTHESIA 1ST 15 MINUTES: Performed by: SURGERY

## 2025-05-27 PROCEDURE — 71000044 HC DOSC ROUTINE RECOVERY FIRST HOUR: Performed by: SURGERY

## 2025-05-27 PROCEDURE — 25000003 PHARM REV CODE 250: Performed by: ANESTHESIOLOGY

## 2025-05-27 PROCEDURE — 71000045 HC DOSC ROUTINE RECOVERY EA ADD'L HR: Performed by: SURGERY

## 2025-05-27 PROCEDURE — 36000706: Performed by: SURGERY

## 2025-05-27 PROCEDURE — 27201423 OPTIME MED/SURG SUP & DEVICES STERILE SUPPLY: Performed by: SURGERY

## 2025-05-27 PROCEDURE — 71000015 HC POSTOP RECOV 1ST HR: Performed by: SURGERY

## 2025-05-27 PROCEDURE — C1729 CATH, DRAINAGE: HCPCS | Performed by: SURGERY

## 2025-05-27 PROCEDURE — 25000003 PHARM REV CODE 250: Performed by: SURGERY

## 2025-05-27 PROCEDURE — 15877 SUCTION LIPECTOMY TRUNK: CPT | Mod: 51,CSM,, | Performed by: SURGERY

## 2025-05-27 PROCEDURE — 36000707: Performed by: SURGERY

## 2025-05-27 PROCEDURE — 19318 BREAST REDUCTION: CPT | Mod: 50,,, | Performed by: SURGERY

## 2025-05-27 PROCEDURE — 15830 EXC EXCESSIVE SKIN ABDOMEN: CPT | Mod: CSM,,, | Performed by: SURGERY

## 2025-05-27 PROCEDURE — 71000016 HC POSTOP RECOV ADDL HR: Performed by: SURGERY

## 2025-05-27 RX ORDER — VASOPRESSIN 20 [USP'U]/ML
INJECTION, SOLUTION INTRAMUSCULAR; SUBCUTANEOUS
Status: DISCONTINUED | OUTPATIENT
Start: 2025-05-27 | End: 2025-05-27

## 2025-05-27 RX ORDER — LIDOCAINE HYDROCHLORIDE 10 MG/ML
1 INJECTION, SOLUTION EPIDURAL; INFILTRATION; INTRACAUDAL; PERINEURAL ONCE AS NEEDED
Status: DISCONTINUED | OUTPATIENT
Start: 2025-05-27 | End: 2025-05-27 | Stop reason: HOSPADM

## 2025-05-27 RX ORDER — SODIUM CHLORIDE 9 MG/ML
INJECTION, SOLUTION INTRAVENOUS CONTINUOUS
Status: DISCONTINUED | OUTPATIENT
Start: 2025-05-27 | End: 2025-05-27 | Stop reason: HOSPADM

## 2025-05-27 RX ORDER — HALOPERIDOL LACTATE 5 MG/ML
0.5 INJECTION, SOLUTION INTRAMUSCULAR EVERY 10 MIN PRN
Status: COMPLETED | OUTPATIENT
Start: 2025-05-27 | End: 2025-05-27

## 2025-05-27 RX ORDER — HEPARIN SODIUM 5000 [USP'U]/ML
INJECTION, SOLUTION INTRAVENOUS; SUBCUTANEOUS
Status: COMPLETED
Start: 2025-05-27 | End: 2025-05-27

## 2025-05-27 RX ORDER — HEPARIN SODIUM 5000 [USP'U]/ML
INJECTION, SOLUTION INTRAVENOUS; SUBCUTANEOUS
Status: DISCONTINUED | OUTPATIENT
Start: 2025-05-27 | End: 2025-05-27

## 2025-05-27 RX ORDER — ONDANSETRON HYDROCHLORIDE 2 MG/ML
INJECTION, SOLUTION INTRAVENOUS
Status: DISCONTINUED | OUTPATIENT
Start: 2025-05-27 | End: 2025-05-27

## 2025-05-27 RX ORDER — LIDOCAINE HYDROCHLORIDE 20 MG/ML
INJECTION, SOLUTION EPIDURAL; INFILTRATION; INTRACAUDAL; PERINEURAL
Status: DISCONTINUED | OUTPATIENT
Start: 2025-05-27 | End: 2025-05-27

## 2025-05-27 RX ORDER — MIDAZOLAM HYDROCHLORIDE 1 MG/ML
INJECTION INTRAMUSCULAR; INTRAVENOUS
Status: DISCONTINUED | OUTPATIENT
Start: 2025-05-27 | End: 2025-05-27

## 2025-05-27 RX ORDER — INDOMETHACIN 25 MG/1
CAPSULE ORAL
Status: DISCONTINUED | OUTPATIENT
Start: 2025-05-27 | End: 2025-05-27 | Stop reason: HOSPADM

## 2025-05-27 RX ORDER — TRIAMCINOLONE ACETONIDE 40 MG/ML
INJECTION, SUSPENSION INTRA-ARTICULAR; INTRAMUSCULAR
Status: DISCONTINUED | OUTPATIENT
Start: 2025-05-27 | End: 2025-05-27 | Stop reason: HOSPADM

## 2025-05-27 RX ORDER — KETOROLAC TROMETHAMINE 30 MG/ML
INJECTION, SOLUTION INTRAMUSCULAR; INTRAVENOUS
Status: DISCONTINUED | OUTPATIENT
Start: 2025-05-27 | End: 2025-05-27

## 2025-05-27 RX ORDER — CEFAZOLIN SODIUM 1 G/3ML
INJECTION, POWDER, FOR SOLUTION INTRAMUSCULAR; INTRAVENOUS
Status: DISCONTINUED | OUTPATIENT
Start: 2025-05-27 | End: 2025-05-27

## 2025-05-27 RX ORDER — BACITRACIN ZINC 500 UNIT/G
OINTMENT (GRAM) TOPICAL
Status: DISCONTINUED | OUTPATIENT
Start: 2025-05-27 | End: 2025-05-27 | Stop reason: HOSPADM

## 2025-05-27 RX ORDER — IBUPROFEN 600 MG/1
600 TABLET, FILM COATED ORAL EVERY 6 HOURS
Qty: 28 TABLET | Refills: 0 | Status: SHIPPED | OUTPATIENT
Start: 2025-05-27 | End: 2025-06-03

## 2025-05-27 RX ORDER — KETOROLAC TROMETHAMINE 15 MG/ML
15 INJECTION, SOLUTION INTRAMUSCULAR; INTRAVENOUS EVERY 8 HOURS PRN
Status: DISCONTINUED | OUTPATIENT
Start: 2025-05-27 | End: 2025-05-27 | Stop reason: HOSPADM

## 2025-05-27 RX ORDER — FENTANYL CITRATE 50 UG/ML
INJECTION, SOLUTION INTRAMUSCULAR; INTRAVENOUS
Status: DISCONTINUED | OUTPATIENT
Start: 2025-05-27 | End: 2025-05-27

## 2025-05-27 RX ORDER — GLUCAGON 1 MG
1 KIT INJECTION
Status: DISCONTINUED | OUTPATIENT
Start: 2025-05-27 | End: 2025-05-27 | Stop reason: HOSPADM

## 2025-05-27 RX ORDER — LIDOCAINE HYDROCHLORIDE 10 MG/ML
INJECTION, SOLUTION EPIDURAL; INFILTRATION; INTRACAUDAL; PERINEURAL
Status: DISCONTINUED | OUTPATIENT
Start: 2025-05-27 | End: 2025-05-27 | Stop reason: HOSPADM

## 2025-05-27 RX ORDER — OXYCODONE AND ACETAMINOPHEN 5; 325 MG/1; MG/1
1 TABLET ORAL
Status: DISCONTINUED | OUTPATIENT
Start: 2025-05-27 | End: 2025-05-27 | Stop reason: HOSPADM

## 2025-05-27 RX ORDER — LIDOCAINE HYDROCHLORIDE AND EPINEPHRINE 10; 10 UG/ML; MG/ML
INJECTION, SOLUTION INFILTRATION; PERINEURAL
Status: DISCONTINUED | OUTPATIENT
Start: 2025-05-27 | End: 2025-05-27 | Stop reason: HOSPADM

## 2025-05-27 RX ORDER — ACETAMINOPHEN 500 MG
500 TABLET ORAL ONCE
Status: COMPLETED | OUTPATIENT
Start: 2025-05-27 | End: 2025-05-27

## 2025-05-27 RX ORDER — BUPIVACAINE HYDROCHLORIDE 2.5 MG/ML
INJECTION, SOLUTION EPIDURAL; INFILTRATION; INTRACAUDAL; PERINEURAL
Status: DISCONTINUED | OUTPATIENT
Start: 2025-05-27 | End: 2025-05-27 | Stop reason: HOSPADM

## 2025-05-27 RX ORDER — EPINEPHRINE 1 MG/ML
INJECTION, SOLUTION, CONCENTRATE INTRAVENOUS
Status: DISCONTINUED | OUTPATIENT
Start: 2025-05-27 | End: 2025-05-27 | Stop reason: HOSPADM

## 2025-05-27 RX ORDER — ACETAMINOPHEN 500 MG
500 TABLET ORAL EVERY 6 HOURS
Qty: 28 TABLET | Refills: 0 | Status: SHIPPED | OUTPATIENT
Start: 2025-05-27 | End: 2025-06-03

## 2025-05-27 RX ORDER — CEPHALEXIN 500 MG/1
500 CAPSULE ORAL EVERY 8 HOURS
Qty: 15 CAPSULE | Refills: 0 | Status: SHIPPED | OUTPATIENT
Start: 2025-05-27 | End: 2025-06-01

## 2025-05-27 RX ORDER — PHENYLEPHRINE HCL IN 0.9% NACL 1 MG/10 ML
SYRINGE (ML) INTRAVENOUS
Status: DISCONTINUED | OUTPATIENT
Start: 2025-05-27 | End: 2025-05-27

## 2025-05-27 RX ORDER — EPHEDRINE SULFATE 50 MG/ML
INJECTION, SOLUTION INTRAVENOUS
Status: DISCONTINUED | OUTPATIENT
Start: 2025-05-27 | End: 2025-05-27

## 2025-05-27 RX ORDER — HYDROMORPHONE HYDROCHLORIDE 1 MG/ML
0.2 INJECTION, SOLUTION INTRAMUSCULAR; INTRAVENOUS; SUBCUTANEOUS EVERY 10 MIN PRN
Status: DISCONTINUED | OUTPATIENT
Start: 2025-05-27 | End: 2025-05-27 | Stop reason: HOSPADM

## 2025-05-27 RX ORDER — TRAMADOL HYDROCHLORIDE 50 MG/1
50 TABLET, FILM COATED ORAL EVERY 6 HOURS PRN
Qty: 28 TABLET | Refills: 0 | Status: SHIPPED | OUTPATIENT
Start: 2025-05-27 | End: 2025-06-03

## 2025-05-27 RX ORDER — METHOCARBAMOL 500 MG/1
500 TABLET, FILM COATED ORAL 3 TIMES DAILY
Qty: 30 TABLET | Refills: 0 | Status: SHIPPED | OUTPATIENT
Start: 2025-05-27 | End: 2025-06-06

## 2025-05-27 RX ORDER — ROCURONIUM BROMIDE 10 MG/ML
INJECTION, SOLUTION INTRAVENOUS
Status: DISCONTINUED | OUTPATIENT
Start: 2025-05-27 | End: 2025-05-27

## 2025-05-27 RX ORDER — KETAMINE HCL IN 0.9 % NACL 50 MG/5 ML
SYRINGE (ML) INTRAVENOUS
Status: DISCONTINUED | OUTPATIENT
Start: 2025-05-27 | End: 2025-05-27

## 2025-05-27 RX ORDER — PROPOFOL 10 MG/ML
VIAL (ML) INTRAVENOUS
Status: DISCONTINUED | OUTPATIENT
Start: 2025-05-27 | End: 2025-05-27

## 2025-05-27 RX ORDER — DEXAMETHASONE SODIUM PHOSPHATE 4 MG/ML
INJECTION, SOLUTION INTRA-ARTICULAR; INTRALESIONAL; INTRAMUSCULAR; INTRAVENOUS; SOFT TISSUE
Status: DISCONTINUED | OUTPATIENT
Start: 2025-05-27 | End: 2025-05-27

## 2025-05-27 RX ORDER — TRAMADOL HYDROCHLORIDE 50 MG/1
50 TABLET, FILM COATED ORAL ONCE
Status: COMPLETED | OUTPATIENT
Start: 2025-05-27 | End: 2025-05-27

## 2025-05-27 RX ADMIN — HEPARIN SODIUM 5000 UNITS: 5000 INJECTION INTRAVENOUS; SUBCUTANEOUS at 08:05

## 2025-05-27 RX ADMIN — PROMETHAZINE HYDROCHLORIDE 12.5 MG: 25 INJECTION INTRAMUSCULAR; INTRAVENOUS at 06:05

## 2025-05-27 RX ADMIN — PROPOFOL 200 MG: 10 INJECTION, EMULSION INTRAVENOUS at 07:05

## 2025-05-27 RX ADMIN — ACETAMINOPHEN 500 MG: 500 TABLET ORAL at 02:05

## 2025-05-27 RX ADMIN — EPHEDRINE SULFATE 5 MG: 50 INJECTION INTRAVENOUS at 11:05

## 2025-05-27 RX ADMIN — SUGAMMADEX 200 MG: 100 INJECTION, SOLUTION INTRAVENOUS at 12:05

## 2025-05-27 RX ADMIN — HALOPERIDOL LACTATE 0.5 MG: 5 INJECTION, SOLUTION INTRAMUSCULAR at 03:05

## 2025-05-27 RX ADMIN — ROCURONIUM BROMIDE 25 MG: 10 INJECTION INTRAVENOUS at 10:05

## 2025-05-27 RX ADMIN — HYDROMORPHONE HYDROCHLORIDE 0.2 MG: 1 INJECTION, SOLUTION INTRAMUSCULAR; INTRAVENOUS; SUBCUTANEOUS at 01:05

## 2025-05-27 RX ADMIN — DEXAMETHASONE SODIUM PHOSPHATE 8 MG: 4 INJECTION INTRA-ARTICULAR; INTRALESIONAL; INTRAMUSCULAR; INTRAVENOUS; SOFT TISSUE at 10:05

## 2025-05-27 RX ADMIN — Medication 200 MCG: at 08:05

## 2025-05-27 RX ADMIN — Medication 10 MG: at 10:05

## 2025-05-27 RX ADMIN — VASOPRESSIN 2 UNITS: 20 INJECTION INTRAVENOUS at 10:05

## 2025-05-27 RX ADMIN — SODIUM CHLORIDE: 9 INJECTION, SOLUTION INTRAVENOUS at 07:05

## 2025-05-27 RX ADMIN — Medication 200 MCG: at 09:05

## 2025-05-27 RX ADMIN — ROCURONIUM BROMIDE 10 MG: 10 INJECTION INTRAVENOUS at 10:05

## 2025-05-27 RX ADMIN — Medication 100 MCG: at 11:05

## 2025-05-27 RX ADMIN — KETOROLAC TROMETHAMINE 30 MG: 30 INJECTION, SOLUTION INTRAMUSCULAR; INTRAVENOUS at 12:05

## 2025-05-27 RX ADMIN — LIDOCAINE HYDROCHLORIDE 100 MG: 20 INJECTION, SOLUTION EPIDURAL; INFILTRATION; INTRACAUDAL at 07:05

## 2025-05-27 RX ADMIN — ONDANSETRON 4 MG: 2 INJECTION INTRAMUSCULAR; INTRAVENOUS at 10:05

## 2025-05-27 RX ADMIN — CEFAZOLIN 2 G: 330 INJECTION, POWDER, FOR SOLUTION INTRAMUSCULAR; INTRAVENOUS at 08:05

## 2025-05-27 RX ADMIN — MIDAZOLAM 2 MG: 1 INJECTION INTRAMUSCULAR; INTRAVENOUS at 07:05

## 2025-05-27 RX ADMIN — SODIUM CHLORIDE: 9 INJECTION, SOLUTION INTRAVENOUS at 06:05

## 2025-05-27 RX ADMIN — FENTANYL CITRATE 50 MCG: 50 INJECTION, SOLUTION INTRAMUSCULAR; INTRAVENOUS at 11:05

## 2025-05-27 RX ADMIN — SODIUM CHLORIDE, SODIUM GLUCONATE, SODIUM ACETATE, POTASSIUM CHLORIDE, MAGNESIUM CHLORIDE, SODIUM PHOSPHATE, DIBASIC, AND POTASSIUM PHOSPHATE: .53; .5; .37; .037; .03; .012; .00082 INJECTION, SOLUTION INTRAVENOUS at 10:05

## 2025-05-27 RX ADMIN — TRAMADOL HYDROCHLORIDE 50 MG: 50 TABLET, COATED ORAL at 01:05

## 2025-05-27 RX ADMIN — ROCURONIUM BROMIDE 20 MG: 10 INJECTION INTRAVENOUS at 09:05

## 2025-05-27 RX ADMIN — Medication 25 MG: at 07:05

## 2025-05-27 RX ADMIN — HALOPERIDOL LACTATE 0.5 MG: 5 INJECTION, SOLUTION INTRAMUSCULAR at 04:05

## 2025-05-27 RX ADMIN — Medication 300 MCG: at 08:05

## 2025-05-27 RX ADMIN — ROCURONIUM BROMIDE 70 MG: 10 INJECTION INTRAVENOUS at 07:05

## 2025-05-27 RX ADMIN — Medication 5 MG: at 10:05

## 2025-05-27 RX ADMIN — VASOPRESSIN 2 UNITS: 20 INJECTION INTRAVENOUS at 11:05

## 2025-05-27 NOTE — BRIEF OP NOTE
[facility] ([facility])  Brief Operative Note    Surgery Date: 5/27/2025     Surgeons and Role:     * Hermes Lugo MD - Primary     * Sonny Dill MD - Resident - Assisting     * Maicol Alfaro MD - Fellow        Pre-op Diagnosis:  Hypertrophy of breast [N62]  Macromastia [N62]  Chronic pain in right shoulder [M25.511, G89.29]  Chronic pain in left shoulder [M25.512, G89.29]  Cervicalgia [M54.2]  Erythema intertrigo [L30.4]    Post-op Diagnosis:  Post-Op Diagnosis Codes:     * Hypertrophy of breast [N62]     * Macromastia [N62]     * Chronic pain in right shoulder [M25.511, G89.29]     * Chronic pain in left shoulder [M25.512, G89.29]     * Cervicalgia [M54.2]     * Erythema intertrigo [L30.4]    Procedure(s) (LRB):  MAMMOPLASTY, REDUCTION, BILATERAL (Bilateral)  ABDOMINOPLASTY (N/A)  LIPOSUCTION, ABDOMEN (N/A)    Anesthesia: General    Operative Findings: see op note     Estimated Blood Loss: * No values recorded between 5/27/2025  7:35 AM and 5/27/2025 12:37 PM *         Specimens:   Specimen (24h ago, onward)       Start     Ordered    05/27/25 1059  Specimen to Pathology Breast  RELEASE UPON ORDERING        References:    Click here for ordering Quick Tip   Question:  Release to patient  Answer:  Immediate    05/27/25 1059                    ID Type Source Tests Collected by Time Destination   1 : LEFT BREAST TISSUE Tissue Breast, Left SPECIMEN TO PATHOLOGY Hermes Lugo MD 5/27/2025 1058    2 : RIGHT BREAST TISSUE Tissue Breast, Right SPECIMEN TO PATHOLOGY Hermes Lugo MD 5/27/2025 1059            Discharge Note    OUTCOME: Patient tolerated treatment/procedure well without complication and is now ready for discharge.    DISPOSITION: Home or Self Care    FINAL DIAGNOSIS:  <principal problem not specified>    FOLLOWUP: In clinic    DISCHARGE INSTRUCTIONS:    Discharge Procedure Orders   Basic Metabolic Panel   Standing Status: Future Number of Occurrences: 1 Standing  Exp. Date: 04/28/26     CBC Without Differential   Standing Status: Future Number of Occurrences: 1 Standing Exp. Date: 04/28/26

## 2025-05-27 NOTE — ANESTHESIA PREPROCEDURE EVALUATION
[facility]  Anesthesia Pre-Operative Evaluation     Patient Name: Umer Rodriguez  YOB: 1980  MRN: [id number]  [facility]: [id number]       Admit Date: 5/27/2025   Admit Team: Networked reference to record PCT   Hospital Day: 1  Date of Procedure: 5/27/2025  Anesthesia: General Procedure: Procedure(s) (LRB):  MAMMOPLASTY, REDUCTION, BILATERAL (Bilateral)  ABDOMINOPLASTY (N/A)  LIPOSUCTION, ABDOMEN (N/A)  Pre-Operative Diagnosis: Hypertrophy of breast [N62]  Macromastia [N62]  Chronic pain in right shoulder [M25.511, G89.29]  Chronic pain in left shoulder [M25.512, G89.29]  Cervicalgia [M54.2]  Erythema intertrigo [L30.4]  Proceduralist:Surgeons and Role:     * Hermes Lugo MD - Primary     * Sonny Dill MD - Resident - Assisting  Code Status: Full Code   Advanced Directive: Not Received  Isolation Precautions: No active isolations  Capacity: Full capacity     SUBJECTIVE:   Umer Rodriguez is a 45 y.o. female who  has a past medical history of Allergy, Dysmenorrhea (11/14/2024), Fibroids, ARNOL (iron deficiency anemia) (01/21/2014), Menorrhagia with regular cycle (10/24/2024), Painful orthopaedic hardware (12/07/2016), Short toes (12/07/2016), and Toe contracture (12/07/2016).  No notes on file   0.9% NaCl   Intravenous Continuous 70 mL/hr at 05/27/25 0629 New Bag at 05/27/25 0629     Hospital LOS: 0 days  ICU LOS: Patient does not have an ICU stay during this admission.    she is not on any long-term medications.   Current Outpatient Medications   Medication Instructions    acetaminophen 325 mg Cap     ergocalciferol (ERGOCALCIFEROL) 50,000 Units, Oral, Every 7 days    ibuprofen (ADVIL,MOTRIN) 800 mg, Oral, 2 times daily PRN     ALLERGIES:     Review of patient's allergies indicates:   Allergen Reactions    Oxycodone Anaphylaxis     LDA:   AIRWAY:         * No LDAs found *      Lines/Drains/Airways       Peripheral Intravenous Line  Duration                   Peripheral IV - Single Lumen 25 0629 20 G Left;Posterior Hand <1 day                   Anesthesia Evaluation      Airway   Dental      Pulmonary    Cardiovascular   Exercise tolerance: good    Neuro/Psych    (+) neuromuscular disease    GI/Hepatic/Renal      Endo/Other    Abdominal                    MEDICATIONS:     Current Outpatient Medications on File Prior to Encounter   Medication Sig Dispense Refill Last Dose/Taking    ibuprofen (ADVIL,MOTRIN) 800 MG tablet Take 1 tablet (800 mg total) by mouth 2 (two) times daily as needed for Pain. 60 tablet 2 More than a month      Inpatient Medications:  Antibiotics (From admission, onward)      None          VTE Risk Mitigation (From admission, onward)           Ordered     IP VTE HIGH RISK PATIENT  Once         25 05     Place sequential compression device  Until discontinued         25 05                      Current Medications[1]       History:   There are no hospital problems to display for this patient.    Surgical History:    has a past surgical history that includes  section (, ); Foot surgery (Right); Cystoscopy (N/A, 2019); Injection of botulinum toxin type A (N/A, 2019); Colonoscopy (N/A, 10/23/2024); and hysterectomy, total, laparoscopic, with salpingectomy (N/A, 2024).   Social History:    reports that she is not currently sexually active and has had partner(s) who are male. She reports using the following methods of birth control/protection: Abstinence and None.  reports that she has never smoked. She has never been exposed to tobacco smoke. She has never used smokeless tobacco. She reports that she does not currently use alcohol. She reports that she does not use drugs.    Vitals:    25 0615 25 0619   BP:  122/87   BP Location:  Left arm   Patient Position:  Lying   Pulse: 64 76   Resp:  18   Temp:  36.3 °C (97.3 °F)   TempSrc:  Temporal   SpO2:  99%   Weight:  85.3 kg (188 lb)   Height:   "5' 2" (1.575 m)     Vital Signs Range (Last 24H):  Temp:  [36.3 °C (97.3 °F)]   Pulse:  [64-76]   Resp:  [18]   BP: (122)/(87)   SpO2:  [99 %]     Body mass index is 34.39 kg/m².  Wt Readings from Last 4 Encounters:   05/27/25 85.3 kg (188 lb)   05/21/25 85.5 kg (188 lb 6.1 oz)   05/19/25 84.4 kg (186 lb 1.1 oz)   05/13/25 84.8 kg (186 lb 15.2 oz)        Intake/Output - Last 3 Shifts       None          Lab Results   Component Value Date    WBC 8.08 05/21/2025    HGB 13.7 05/21/2025    HCT 43.8 05/21/2025     05/21/2025     05/21/2025    K 4.3 05/21/2025     05/21/2025    CREATININE 0.8 05/21/2025    BUN 14 05/21/2025    CO2 26 05/21/2025    GLU 93 05/21/2025    CALCIUM 8.6 (L) 05/21/2025    ALKPHOS 68 04/14/2025    ALT 19 04/14/2025    AST 19 04/14/2025    ALBUMIN 3.0 (L) 04/14/2025    INR 1.0 10/28/2011    APTT 24.5 10/28/2011    HGBA1C 5.6 04/14/2025    HCGQUANT 20851.5 (H) 07/17/2012     No results found for this or any previous visit (from the past 12 hours).  Recent Labs   Lab 05/21/25  0950   WBC 8.08   HGB 13.7   HCT 43.8         K 4.3   CREATININE 0.8   GLU 93     Patient's last menstrual period was 10/21/2024 (approximate).    EKG:   Results for orders placed or performed during the hospital encounter of 11/14/16   EKG 12-lead    Collection Time: 11/14/16 12:50 PM    Narrative    Test Reason : Z01.818  Blood Pressure : * mmHG  Vent. Rate : 055 BPM     Atrial Rate : 055 BPM     P-R Int : 138 ms          QRS Dur : 074 ms      QT Int : 406 ms       P-R-T Axes : 059 064 052 degrees     QTc Int : 388 ms    Sinus bradycardia  Otherwise normal ECG  No previous ECGs available  Confirmed by Mykel Nieves MD ([id number]) on 11/15/2016 9:07:18 AM    Referred By: MARJORIE TRAVIS           Confirmed By:Mykel Nieves MD     TTE:  No results found for this or any previous visit.    ALIN:  No results found for this or any previous visit.    Stress Test:  No results found for this or any " previous visit.    No results found for this or any previous visit.    LHC:  No results found for this or any previous visit.    Cardiac Device Check   No results found for this or any previous visit.    No results found for this or any previous visit.        Pre-op Assessment    I have reviewed the Patient Summary Reports.     I have reviewed the Nursing Notes. I have reviewed the NPO Status.   I have reviewed the Medications.     Review of Systems  Anesthesia Hx:  No problems with previous Anesthesia             Denies Family Hx of Anesthesia complications.    Denies Personal Hx of Anesthesia complications.                    Social:  Non-Smoker, Social Alcohol Use       Hematology/Oncology:  Hematology Normal   Oncology Normal                                   EENT/Dental:  EENT/Dental Normal           Cardiovascular:  Exercise tolerance: good                     Functional Capacity good / => 4 METS                         Pulmonary:  Pulmonary Normal                       Renal/:  Renal/ Normal                 Hepatic/GI:  Hepatic/GI Normal                    Musculoskeletal:  Musculoskeletal Normal                Neurological:    Neuromuscular Disease,                                   Endocrine:        Obesity / BMI > 30  Dermatological:  Skin Normal    Psych:  Psychiatric Normal                       Anesthesia Plan  Type of Anesthesia, risks & benefits discussed:    Anesthesia Type: Gen ETT  Intra-op Monitoring Plan: Standard ASA Monitors  Post Op Pain Control Plan: multimodal analgesia  Induction:  IV  Airway Plan: Direct and Video, Post-Induction  Informed Consent: Informed consent signed with the Patient and all parties understand the risks and agree with anesthesia plan.  All questions answered.   ASA Score: 1  Day of Surgery Review of History & Physical: H&P Update referred to the surgeon/provider.    Ready For Surgery From Anesthesia Perspective.     .           [1]   Current Facility-Administered  Medications   Medication Dose Route Frequency Provider Last Rate Last Admin    0.9% NaCl infusion   Intravenous Continuous Sonny Dill MD 70 mL/hr at 05/27/25 0629 New Bag at 05/27/25 0629    LIDOcaine (PF) 10 mg/ml (1%) injection 10 mg  1 mL Intradermal Once PRN Hermes Lugo MD

## 2025-05-27 NOTE — TRANSFER OF CARE
"Anesthesia Transfer of Care Note    Patient: Umer Rodriguez    Procedure(s) Performed: Procedure(s) (LRB):  MAMMOPLASTY, REDUCTION, BILATERAL (Bilateral)  ABDOMINOPLASTY (N/A)  LIPOSUCTION, ABDOMEN (N/A)    Patient location: PACU    Anesthesia Type: general    Transport from OR: Transported from OR on 6-10 L/min O2 by face mask with adequate spontaneous ventilation    Post pain: adequate analgesia    Post assessment: no apparent anesthetic complications    Post vital signs: stable    Level of consciousness: awake and oriented    Nausea/Vomiting: no nausea/vomiting    Complications: none    Transfer of care protocol was followed      Last vitals: Visit Vitals  /78   Pulse 82   Temp 36.1 °C (97 °F) (Skin)   Resp 12   Ht 5' 2" (1.575 m)   Wt 85.3 kg (188 lb)   LMP 10/21/2024 (Approximate)   SpO2 99%   Breastfeeding No   BMI 34.39 kg/m²     "

## 2025-05-27 NOTE — PROGRESS NOTES
Patient continues to feel very nauseated. Contacted anesthesia, and received new order for Phenergan IV.

## 2025-05-27 NOTE — PLAN OF CARE
Notified Dr Dill of need for blood product refusal form per protocol d/t pt stated religous objections to receiving blood products.

## 2025-05-27 NOTE — OP NOTE
Date of Surgery: 5/27/25  Pre op Diagnosis:  1 Hypertrophy of breast  2 Erythema intertrigo  3 Cervicalgia  4 Chronic pain in left shoulder  5 Chronic pain in right shoulder  Post op Diagnosis:  same  Procedure performed: bilateral breast reduction  Surgeon: Dr Hermes Lugo  Anesthesia: general  Complications none  Blood loss 150cc     The patient was evaluated in the preoperative holding area. Markings for the inferior pedicle bilateral breast reduction were made. The risks and possible complications including but not limited to; infection, bleeding, scarring, loss of sensation to the nipple, partial or total loss of the nipple, breast asymmetry, nipple malposition, breast deformity, open wounds and need for further surgery were all explained to the patient. Discussed with patient that we will not remove any axillary tissue or tissue extending towards the back. The patient fully understands that she may have axillary fullness.The patient signed the informed consent.     The patient was taken to the operating room and placed in the supine position. After adequate general endotracheal anesthesia, the patient was prepped and draped in the normal sterile fashion. The new nipple areola was marked measuring 42mm. The inferior pedicle was marked measuring 11cm at its base. The inferior pedicle was de-epithelialized. Good punctate bleeding was noted. The supra- medial and supra-lateral flaps were elevated to the clavicle superiorly, the lateral border of the sternum medially and the mid-axillary line laterally. The medial and lateral skin wedges were excise. The inferior pedicle was debulked. A similar procedure was performed on the opposite side. The incision's were closed using dermal stapler followed by a running 3-0 Stratafix subcuticular suture for the infra-mammary incision. The vertical limb and nipple areolar complex were closed using interrupted 4.0 monocryl followed by a running 4.0 monocryl subcuticular  suture. A similar closure was performed on the opposite side.    Approximate weight removed right breast: approx 700G  Approximate weight removed left breast: approx 700G      Our attention was then turned towards the abdominoplasty portion of the procedure.  The patient had it evaluated in the preoperative holding area.  She had a short abdomen which was very protuberant.  I discussed with the patient that she would not be thin after this operation.  Even though we would liposuction large amount and performed an abdominoplasty and plication that because of her visceral fat she would still not be thin after this operation.  The patient fully understands this.  The risks and possible complications including but not limited to infection, bleeding, scarring, contour abnormalities, lumps, bumps open wounds requiring months to heal and need for further surgery was explained to the patient.  Patient signed informed consent.  Tumescent fluid was then instilled into the abdomen hips and flanks.  This was followed by liposuction hip and flanks.  Proximally 450 cc of fat was removed from each hip and flank region.  Next, liposuction of the central and upper abdomen was performed taking great care thickened always about the blood supply to the lower abdomen.  Proximally 700 cc of fat was removed from the upper abdomen and central abdomen.  Next lower transverse abdominal incision was then made.  It was deepened to the rectus fascia medially in the external oblique aponeurosis laterally.  A suprafascial dissection then proceeded to the umbilicus.  A circumferential incision around the umbilicus was made.  The dissection then proceeded in a tunnel a proximally 12-14 cm to the xiphoid.  We did not go over the costal margin on either the right or left sides.  Next, double layer plication was performed measuring 10 cm in the horizontal dimension.  This was done using a double-stranded 0 looped nylon suture followed by interrupted  2-0 Vicryl.  Two drains were placed.  The incision of the abdomen was closed using interrupted 2-0 Vicryl sutures followed by dermal staples as well as multiple 3-0 Monocryl sutures.  Skin was closed using a running 3-0 Stratafix suture.  There were no complications.

## 2025-05-27 NOTE — ANESTHESIA POSTPROCEDURE EVALUATION
Anesthesia Post Evaluation    Patient: Umer Rodriguez    Procedure(s) Performed: Procedure(s) (LRB):  MAMMOPLASTY, REDUCTION, BILATERAL (Bilateral)  ABDOMINOPLASTY (N/A)  LIPOSUCTION, ABDOMEN (N/A)    Final Anesthesia Type: general      Patient location during evaluation: [facility]  Patient participation: Yes- Able to Participate  Level of consciousness: awake and alert  Post-procedure vital signs: reviewed and stable  Pain management: adequate  Airway patency: patent    PONV status at discharge: No PONV  Anesthetic complications: no      Cardiovascular status: blood pressure returned to baseline  Respiratory status: unassisted  Hydration status: euvolemic  Follow-up not needed.              Vitals Value Taken Time   /85 05/27/25 13:31   Temp 36.1 °C (97 °F) 05/27/25 13:06   Pulse 78 05/27/25 13:43   Resp 12 05/27/25 13:30   SpO2 96 % 05/27/25 13:43   Vitals shown include unfiled device data.      No case tracking events are documented in the log.      Pain/Sissy Score: Pain Rating Prior to Med Admin: 7 (5/27/2025  1:06 PM)  Sissy Score: 9 (5/27/2025  1:30 PM)

## 2025-05-27 NOTE — DISCHARGE INSTRUCTIONS
Bacitracin to nipples only three times a day   Keep LINDY drains in place and monitor output, record in log for office visit   Okay to remove bandages in 24hrs and shower in 48hrs, avoid submerging the incisions in bath tub/pool  Use of narcotics can lead to constipation, recommend taking a stool softener while on medications  Call the office with any questions or concerns     Activity: Remember to:   Turn, cough and deep breathe every 2-3 hrs while awake to expand your lungs.   Use a breathing device every 1-2 hrs while awake to expand your lungs.   Wear your JORDON hose daily until you are moving about freely.   Use a pillow to splint your incisions while coughing.   Point your toes to the ceiling and push your heels downward frequently in order to prevent blood clots.   Keep your chest elevated on 2-3 pillows for 2 weeks or until all the swelling has diminished. Do not lie  on either side for 6 weeks.   You may begin to do leg and lower body exercises but nothing using the upper torso for 4-6 weeks  after surgery. Check with your doctor before resuming any exercise program.   Do not engage in sexual activity for at least 2 weeks after surgery.   Avoid bending down and picking up heavy objects. Do not  anything over 10 lbs for the first 4  weeks. At 4-6 weeks you may increase weight up to 25 lbs. After 6 weeks you are free to lift anything.   Do not operate a motor vehicle until you can handle the steering wheel without causing any discomfort  or within 24 hrs of taking prescribed pain medication.  Dressings:   Keep your dressings dry. You may shower in 48 hours at which time you may change your dressings.   Do not remove your steristrips. They will fall off in 5-7 days or be removed by your doctor on your  postoperative visit.   Wear the surgical bra at all times for the first 2-3 weeks. You may remove it to shower or to clean it.  Check with your doctor to see when you can switch to a good support bra.  Additional bras may be  purchased from the department at an additional cost.  If you have any drains, you will need to:   Strip the drain tubings   Empty the drain bulbs   Compress the bulbs, every 4 hrs unless they are more than ½ full.  Empty every 8 hours or when half full.   The nurse should demonstrate this before your discharge from the surgery unit. The drains will be removed  when they put out less than 25 cc in 24 hrs. You should record the amount of drainage on a sheet of paper for  each drain for every 24 hour period. The totals will be given to the MD on your post of visit.  You may see little pimple-like blisters along your incision site. They may appear as tiny abscesses, wounds that  wont heal, or as pimples. Theyre sutures that have not completely dissolved.  Home Care Instructions  Breast Implant/Explant  Dr Lugo  Page 1 of 2  Rev 3/07  Medications:  Take your prescribed medications as ordered. Take your antibiotics until theyre all gone. You will need to be  on antibiotics as long as you have a drain tube in. Stay away from aspirin, ibuprofen, and Vitamin E products  for 2 weeks after your surgery. You may take Tylenol if experiencing pain, if it is not an ingredient in your pain  medication. Pain medication, anesthesia, and a lack of exercise can cause you to have bowel problems. If you  have not had a bowel movement in 3-5 days after surgery call the office and an over the counter remedy will  be recommended.  Diet:  At home, continue with liquids, and if there is no nausea, you may eat a soft bland diet. Gradually resume your  normal diet. Avoid foods, such as Mexican, Chinese, seafood, or salty soups, for 1 week after surgery.  Other:   Do not smoke or be with smokers for 2 weeks after surgery.   Avoid direct sunlight to your incision. Apply a good sun block frequently, (a spf 20 or greater) to your  incision site if you plan to expose them to sunlight.   Do not be alarmed if your  breasts appear too large or too high. The swelling and shape will change the  further away from surgery you get.  Call the office immediately if you have:   A temperature of 101 or greater.   Redness thats beginning to spread away from the incision site.   Any unusually painful swelling.   Any active bleeding saturating more than a 4x4 gauze.   Any purulent drainage coming from the incision site.   Pain that is not relieved by your pain medication.   If you notice any asymmetry or flatness of your chest. (This may mean your implant is leaking or  has a hole in it.)          RETURN APPOINTMENT:________________________________________________________________  FOR EMERGENCIES:  If any unusual problems or difficulties occur, contact Dr. Lugo or the resident at [phone] (page  ) or at the Clinic office, [phone].     OF THE DRAIN:   Wash hands before starting!   Change the bandages daily, keep drain site clean and dry.   Check the drain every 4 hours for the first 24 hours and empty the drain when half full of liquid. After  the first day, empty when half full or every 8 to 12 hours. DO NOT LET THE DRAIN FILL MORE  THAN HALF WAY.   After the drain is emptied, the bulb needs to be squeezed between the palm and the fingers until they  meet and then recap the plug. (See picture.) This is to keep the suction continuous.  HOW TO EMPTY THE DRAIN:   Wash hands before starting!   Remove plug from top and pour fluid into measuring cup.   DO NOT TOUCH OPEN PORT.   Squeeze bulb tightly while plug is still off.   While squeezing bulb replace plug to seal the bulb.   Measure the amount of fluid that was removed from the  bulb and record the amount of the fluid for the doctor  with the date and time it was collected.   Flush the fluid down toilet.  HOW TO PREVENT PROBLEMS:   Always keep the bulb lower than the wound. This will stop the fluid from going back into your body.   Do not pull on the tubing. This can  loosen the stitches holding the drain to your skin, causing the drain  to fall out.  WHEN TO CALL THE DOCTOR OR SEEK IMMEDIATE CARE IF:   The fluid removed by the LINDY drain is cloudy, yellow or foul-smelling OR suddenly stops draining into  the bulb of your LINDY drain.   Swelling or redness where the drain enters your skin.   Feel more pain in the drain area.   See holes or cracks in the tubing or bulb of the drain, or if the drain leaks, the drain stitches come loose  or break off, OR the LINDY DRAIN COMES OUT.   The LINDY drain starts filling up very quickly with bright red blood.   You develop fever above 101ºF (38.4ºC).   Your bandages are soaked with blood.  FOR EMERGENCIES:  If any unusual problems or difficulties occur, contact  __________________ at [phone] (page  ) or at the Clinic office, [phone]

## 2025-05-27 NOTE — H&P
H&P     Umer Rodriguez is a 44 y.o. year old female who presented to the [facility] on 02/12/2025 for consultation regarding the addition of an abdominoplasty with liposuction to the abdomen flanks to her already approved bilateral breast reduction surgery.  The patient understands that this procedure will not be covered by insurance.  Discussed the risks benefits and outcomes associated with the abdominoplasty with liposuction.  The patient is a good candidate for this procedure. Denies fever, chills, nausea, vomiting, or other systemic signs of infection.             Review of patient's allergies indicates:   Allergen Reactions    Oxycodone Anaphylaxis         Medications Ordered Prior to Encounter          Current Outpatient Medications on File Prior to Visit   Medication Sig Dispense Refill    acetaminophen (TYLENOL EXTRA STRENGTH) 500 MG tablet Take 2 tablets (1,000 mg total) by mouth every 6 (six) hours as needed for Pain. 100 tablet 2    cetirizine (ZYRTEC) 10 MG tablet Take 1 tablet (10 mg total) by mouth once daily. 90 tablet 3    docusate sodium (COLACE) 100 MG capsule Take 1 capsule (100 mg total) by mouth 2 (two) times daily. 60 capsule 1    drospirenone-ethinyl estradioL (PHU) 3-0.02 mg per tablet Take 1 tablet by mouth once daily. 84 tablet 4    ergocalciferol (ERGOCALCIFEROL) 50,000 unit Cap Take 1 capsule (50,000 Units total) by mouth every 7 days. 12 capsule 0    ferrous sulfate (FEROSUL) 325 mg (65 mg iron) Tab tablet Take 1 tablet (325 mg total) by mouth once daily. 90 tablet 0    fluticasone propionate (FLONASE) 50 mcg/actuation nasal spray SHAKE LIQUID AND USE 1 SPRAY(50 MCG) IN EACH NOSTRIL DAILY 32 g 2    HYDROmorphone (DILAUDID) 2 MG tablet Take 1 tablet (2 mg total) by mouth every 4 (four) hours as needed for Pain. 20 tablet 0    ibuprofen (ADVIL,MOTRIN) 800 MG tablet Take 1 tablet (800 mg total) by mouth 2 (two) times daily as needed for Pain. 60 tablet 2     neomycin-polymyxin-dexamethasone (MAXITROL) 3.5mg/mL-10,000 unit/mL-0.1 % DrpS Place into both eyes.          No current facility-administered medications on file prior to visit.            Problem List       Patient Active Problem List   Diagnosis    ARNOL (iron deficiency anemia)    Spina bifida of lumbosacral region without hydrocephalus    Tethered spinal cord    Detrusor instability    Macromastia    Seasonal allergic rhinitis due to pollen    Vitamin D deficiency    Menorrhagia with regular cycle    S/P laparoscopic hysterectomy    Abnormal uterine bleeding (AUB)    Dysmenorrhea                  Past Surgical History:   Procedure Laterality Date     SECTION   2013    COLONOSCOPY N/A 10/23/2024     Procedure: COLONOSCOPY;  Surgeon: Keila Seo MD;  Location: [facility] ENDO;  Service: Endoscopy;  Laterality: N/A;  Referral:  Svitlana Becker MD / suprep / inst portal - LW  10/14/24- case adj. 15 min, lvm/portal for pc. DBM  10/17/24- pc complete. DBM  10/18/24- r/s, instr to portal. [facility]  10/22 pt confirmed SW    CYSTOSCOPY N/A 2019     Procedure: CYSTOSCOPY;  Surgeon: Pollo Park MD;  Location: [facility] OR [address] [place] [address];  Service: Urology;  Laterality: N/A;  30 min/2nd floor OR    FOOT SURGERY Right      HYSTERECTOMY, TOTAL, LAPAROSCOPIC, WITH SALPINGECTOMY N/A 2024     Procedure: HYSTERECTOMY,TOTAL,LAPAROSCOPIC,WITH SALPINGECTOMY;  Surgeon: Nanette Timmons MD;  Location: [facility] OR;  Service: OB/GYN;  Laterality: N/A;  RN PREOP 2024   BLOOD REFUSAL SIGNED----UPT ON ARRIVAL    INJECTION OF BOTULINUM TOXIN TYPE A N/A 2019     Procedure: INJECTION, BOTULINUM TOXIN, TYPE A;  Surgeon: Pollo Park MD;  Location: [facility] OR [address] [place] [address];  Service: Urology;  Laterality: N/A;         Social History            Socioeconomic History    Marital status: Single    Number of children: 2   Tobacco Use    Smoking status: Never       Passive exposure: Never    Smokeless tobacco: Never   Substance and  Sexual Activity    Alcohol use: Not Currently       Comment: occasional    Drug use: No    Sexual activity: Not Currently       Partners: Male       Birth control/protection: Abstinence, None      Social Drivers of Health           Financial Resource Strain: Low Risk  (1/1/2025)     Overall Financial Resource Strain (CARDIA)      Difficulty of Paying Living Expenses: Not hard at all   Food Insecurity: No Food Insecurity (1/1/2025)     Hunger Vital Sign      Worried About Running Out of Food in the Last Year: Never true      Ran Out of Food in the Last Year: Never true   Transportation Needs: Patient Declined (12/15/2023)     PRAPARE - Transportation      Lack of Transportation (Medical): Patient declined      Lack of Transportation (Non-Medical): Patient declined   Physical Activity: Unknown (1/1/2025)     Exercise Vital Sign      Days of Exercise per Week: 3 days   Stress: No Stress Concern Present (1/1/2025)     [employer] - Occupational Stress Questionnaire      Feeling of Stress : Not at all   Housing Stability: Unknown (1/1/2025)     Housing Stability Vital Sign      Unable to Pay for Housing in the Last Year: No               Review of Systems:   Constitutional: Denies fever/chills  Eyes: Denies change in vision  ENT: Denies sore throat or rhinorrhea   Respiratory: Denies shortness of breath or cough  Cardiovascular: Denies chest pain or palpitations  Gastrointestinal: Denies abdominal pain, nausea, or vomiting  Genitourinary: Denies dysuria and flank pain. Skin: Denies new rash or skin lesions.   Allergic/Immunologic: Denies adverse reactions to current medications  Neurological: Denies headaches or dizziness  Musculoskeletal: Denies arthralgias.      Objective:      Physical Exam:       WD WN NAD  VSS  Normal resp effort  Excess abdominal skin of the abdomen and flanks  Bilateral large pendulous breasts            Assessment:       No diagnosis found.     Plan:   44 y.o. female  with excess abdominal skin and bilateral pendulous breasts    OR for bilateral breast reduction and abdominoplasty with liposuction    Discussed with patient and/or family the risks and benefits of surgical intervention.  Conservative measures have been exhausted and patient would like to proceed with surgery.      We have discussed risks, which include but are not limited to blood clots in the legs that can travel to the lungs (pulmonary embolism). Pulmonary embolism can cause shortness of breath, chest pain, and even shock. Other risks include urinary tract infection, nausea and vomiting (usually related to pain medication), chronic pain, bleeding, nerve damage, blood vessel injury, scarring and infection, which can require re-operation. Furthermore, the risks of anesthesia include potential heart, lung, kidney, and liver damage.  Informed consent was obtained.  The patient understands and would like to proceed with surgery in the near future.

## 2025-05-27 NOTE — ANESTHESIA PROCEDURE NOTES
Intubation    Date/Time: 5/27/2025 7:59 AM    Performed by: Susanne Colindres MD  Authorized by: Susanne Colindres MD    Intubation:     Induction:  Intravenous    Intubated:  Postinduction    Mask Ventilation:  Easy mask    Attempts:  1    Attempted By:  Staff anesthesiologist    Method of Intubation:  Direct    Blade:  Looney 2    Laryngeal View Grade: Grade IIA - cords partially seen      Difficult Airway Encountered?: No      Complications:  None    Airway Device:  Oral endotracheal tube    Airway Device Size:  7.5    Style/Cuff Inflation:  Cuffed    Inflation Amount (mL):  7    Tube secured:  21    Secured at:  The teeth    Placement Verified By:  Capnometry    Complicating Factors:  None    Findings Post-Intubation:  BS equal bilateral and atraumatic/condition of teeth unchanged

## 2025-05-28 ENCOUNTER — PATIENT MESSAGE (OUTPATIENT)
Dept: PLASTIC SURGERY | Facility: CLINIC | Age: 45
End: 2025-05-28
Payer: COMMERCIAL

## 2025-05-28 ENCOUNTER — PATIENT MESSAGE (OUTPATIENT)
Dept: FAMILY MEDICINE | Facility: CLINIC | Age: 45
End: 2025-05-28
Payer: COMMERCIAL

## 2025-05-29 ENCOUNTER — NURSE TRIAGE (OUTPATIENT)
Dept: ADMINISTRATIVE | Facility: CLINIC | Age: 45
End: 2025-05-29
Payer: COMMERCIAL

## 2025-05-29 ENCOUNTER — OFFICE VISIT (OUTPATIENT)
Dept: FAMILY MEDICINE | Facility: CLINIC | Age: 45
End: 2025-05-29
Payer: COMMERCIAL

## 2025-05-29 DIAGNOSIS — Z09 FOLLOW-UP EXAM: Primary | ICD-10-CM

## 2025-05-29 DIAGNOSIS — S93.409S SPRAIN OF ANKLE, UNSPECIFIED LATERALITY, UNSPECIFIED LIGAMENT, SEQUELA: ICD-10-CM

## 2025-05-29 DIAGNOSIS — Z98.890 S/P BILATERAL BREAST REDUCTION: ICD-10-CM

## 2025-05-29 DIAGNOSIS — N62 MACROMASTIA: ICD-10-CM

## 2025-05-29 DIAGNOSIS — M54.9 BACK PAIN, UNSPECIFIED BACK LOCATION, UNSPECIFIED BACK PAIN LATERALITY, UNSPECIFIED CHRONICITY: ICD-10-CM

## 2025-05-29 PROCEDURE — 98005 SYNCH AUDIO-VIDEO EST LOW 20: CPT | Mod: 95,,,

## 2025-05-29 PROCEDURE — G2211 COMPLEX E/M VISIT ADD ON: HCPCS | Mod: 95,,,

## 2025-05-29 PROCEDURE — 3044F HG A1C LEVEL LT 7.0%: CPT | Mod: CPTII,95,,

## 2025-05-29 NOTE — TELEPHONE ENCOUNTER
Pt stated she had surgery on Tuesday and 4 drains were placed. One of the drains drain #2 had yellow drainage in the tube this am when she empty it. There was 5 cc in it. Pt stated no discomfort or pain.  Care advice recommends on call provider be called now. Dr Sonny Dill called and connected to patient.   Reason for Disposition   Caller has URGENT question and triager unable to answer question    Additional Information   Negative: Sounds like a life-threatening emergency to the triager   Negative: Bright red, wide-spread, sunburn-like rash   Negative: SEVERE headache (e.g., excruciating) and after spinal (epidural) anesthesia   Negative: Vomiting and persists > 4 hours   Negative: Vomiting and abdomen looks much more swollen than usual   Negative: Drinking very little and dehydration suspected (e.g., no urine > 12 hours, very dry mouth, very lightheaded)   Negative: Patient sounds very sick or weak to the triager   Negative: Sounds like a serious complication to the triager   Negative: Fever > 100.4 F (38.0 C)    Protocols used: Post-Op Symptoms and [id number]

## 2025-06-03 ENCOUNTER — OFFICE VISIT (OUTPATIENT)
Dept: FAMILY MEDICINE | Facility: CLINIC | Age: 45
End: 2025-06-03
Payer: COMMERCIAL

## 2025-06-03 ENCOUNTER — PATIENT MESSAGE (OUTPATIENT)
Dept: FAMILY MEDICINE | Facility: CLINIC | Age: 45
End: 2025-06-03

## 2025-06-03 DIAGNOSIS — N62 HYPERTROPHY OF BREAST: Primary | ICD-10-CM

## 2025-06-03 DIAGNOSIS — L29.9 PRURITUS: ICD-10-CM

## 2025-06-03 DIAGNOSIS — M54.9 BACK PAIN, UNSPECIFIED BACK LOCATION, UNSPECIFIED BACK PAIN LATERALITY, UNSPECIFIED CHRONICITY: ICD-10-CM

## 2025-06-04 ENCOUNTER — OFFICE VISIT (OUTPATIENT)
Dept: PLASTIC SURGERY | Facility: CLINIC | Age: 45
End: 2025-06-04
Payer: COMMERCIAL

## 2025-06-04 DIAGNOSIS — Z09 SURGERY FOLLOW-UP EXAMINATION: Primary | ICD-10-CM

## 2025-06-04 PROCEDURE — 1159F MED LIST DOCD IN RCRD: CPT | Mod: CPTII,S$GLB,, | Performed by: SURGERY

## 2025-06-04 PROCEDURE — 3044F HG A1C LEVEL LT 7.0%: CPT | Mod: CPTII,S$GLB,, | Performed by: SURGERY

## 2025-06-04 PROCEDURE — 99999 PR PBB SHADOW E&M-EST. PATIENT-LVL II: CPT | Mod: PBBFAC,,, | Performed by: SURGERY

## 2025-06-04 PROCEDURE — 99024 POSTOP FOLLOW-UP VISIT: CPT | Mod: S$GLB,,, | Performed by: SURGERY

## 2025-06-05 PROBLEM — L29.9 PRURITUS: Status: ACTIVE | Noted: 2025-06-05

## 2025-06-06 ENCOUNTER — PATIENT MESSAGE (OUTPATIENT)
Dept: PLASTIC SURGERY | Facility: CLINIC | Age: 45
End: 2025-06-06
Payer: COMMERCIAL

## 2025-06-09 ENCOUNTER — NURSE TRIAGE (OUTPATIENT)
Dept: ADMINISTRATIVE | Facility: CLINIC | Age: 45
End: 2025-06-09
Payer: COMMERCIAL

## 2025-06-09 ENCOUNTER — HOSPITAL ENCOUNTER (EMERGENCY)
Facility: HOSPITAL | Age: 45
Discharge: HOME | End: 2025-06-09
Attending: EMERGENCY MEDICINE
Payer: COMMERCIAL

## 2025-06-09 VITALS
DIASTOLIC BLOOD PRESSURE: 84 MMHG | BODY MASS INDEX: 34.61 KG/M2 | HEIGHT: 62 IN | TEMPERATURE: 99 F | RESPIRATION RATE: 16 BRPM | SYSTOLIC BLOOD PRESSURE: 132 MMHG | WEIGHT: 188.06 LBS | HEART RATE: 85 BPM | OXYGEN SATURATION: 100 %

## 2025-06-09 DIAGNOSIS — N64.89 BREAST HEMATOMA: Primary | ICD-10-CM

## 2025-06-09 LAB
ABSOLUTE EOSINOPHIL (OHS): 0.6 K/UL
ABSOLUTE MONOCYTE (OHS): 0.59 K/UL (ref 0.3–1)
ABSOLUTE NEUTROPHIL COUNT (OHS): 8.34 K/UL (ref 1.8–7.7)
ALBUMIN SERPL BCP-MCNC: 3.1 G/DL (ref 3.5–5.2)
ALP SERPL-CCNC: 69 UNIT/L (ref 40–150)
ALT SERPL W/O P-5'-P-CCNC: 18 UNIT/L (ref 10–44)
ANION GAP (OHS): 10 MMOL/L (ref 8–16)
AST SERPL-CCNC: 27 UNIT/L (ref 11–45)
BASOPHILS # BLD AUTO: 0.06 K/UL
BASOPHILS NFR BLD AUTO: 0.5 %
BILIRUB SERPL-MCNC: 0.5 MG/DL (ref 0.1–1)
BUN SERPL-MCNC: 13 MG/DL (ref 6–20)
CALCIUM SERPL-MCNC: 8.3 MG/DL (ref 8.7–10.5)
CHLORIDE SERPL-SCNC: 111 MMOL/L (ref 95–110)
CO2 SERPL-SCNC: 21 MMOL/L (ref 23–29)
CREAT SERPL-MCNC: 0.9 MG/DL (ref 0.5–1.4)
ERYTHROCYTE [DISTWIDTH] IN BLOOD BY AUTOMATED COUNT: 14.1 % (ref 11.5–14.5)
GFR SERPLBLD CREATININE-BSD FMLA CKD-EPI: >60 ML/MIN/1.73/M2
GLUCOSE SERPL-MCNC: 100 MG/DL (ref 70–110)
HCT VFR BLD AUTO: 32.5 % (ref 37–48.5)
HCV AB SERPL QL IA: NORMAL
HGB BLD-MCNC: 10.3 GM/DL (ref 12–16)
HIV 1+2 AB+HIV1 P24 AG SERPL QL IA: NORMAL
HOLD SPECIMEN: NORMAL
IMM GRANULOCYTES # BLD AUTO: 0.05 K/UL (ref 0–0.04)
IMM GRANULOCYTES NFR BLD AUTO: 0.4 % (ref 0–0.5)
LYMPHOCYTES # BLD AUTO: 2.29 K/UL (ref 1–4.8)
MCH RBC QN AUTO: 29.3 PG (ref 27–31)
MCHC RBC AUTO-ENTMCNC: 31.7 G/DL (ref 32–36)
MCV RBC AUTO: 93 FL (ref 82–98)
NUCLEATED RBC (/100WBC) (OHS): 0 /100 WBC
PLATELET # BLD AUTO: 344 K/UL (ref 150–450)
PMV BLD AUTO: 9.9 FL (ref 9.2–12.9)
POTASSIUM SERPL-SCNC: 4.4 MMOL/L (ref 3.5–5.1)
PROT SERPL-MCNC: 6.5 GM/DL (ref 6–8.4)
RBC # BLD AUTO: 3.51 M/UL (ref 4–5.4)
RELATIVE EOSINOPHIL (OHS): 5 %
RELATIVE LYMPHOCYTE (OHS): 19.2 % (ref 18–48)
RELATIVE MONOCYTE (OHS): 4.9 % (ref 4–15)
RELATIVE NEUTROPHIL (OHS): 70 % (ref 38–73)
SODIUM SERPL-SCNC: 142 MMOL/L (ref 136–145)
WBC # BLD AUTO: 11.93 K/UL (ref 3.9–12.7)

## 2025-06-09 PROCEDURE — 85025 COMPLETE CBC W/AUTO DIFF WBC: CPT

## 2025-06-09 PROCEDURE — 87389 HIV-1 AG W/HIV-1&-2 AB AG IA: CPT | Performed by: PHYSICIAN ASSISTANT

## 2025-06-09 PROCEDURE — 96367 TX/PROPH/DG ADDL SEQ IV INF: CPT

## 2025-06-09 PROCEDURE — 86803 HEPATITIS C AB TEST: CPT | Performed by: PHYSICIAN ASSISTANT

## 2025-06-09 PROCEDURE — 99285 EMERGENCY DEPT VISIT HI MDM: CPT | Mod: 25

## 2025-06-09 PROCEDURE — 25500020 PHARM REV CODE 255: Performed by: STUDENT IN AN ORGANIZED HEALTH CARE EDUCATION/TRAINING PROGRAM

## 2025-06-09 PROCEDURE — 96365 THER/PROPH/DIAG IV INF INIT: CPT

## 2025-06-09 PROCEDURE — 63600175 PHARM REV CODE 636 W HCPCS

## 2025-06-09 PROCEDURE — 80053 COMPREHEN METABOLIC PANEL: CPT

## 2025-06-09 PROCEDURE — 25000003 PHARM REV CODE 250

## 2025-06-09 RX ORDER — DIPHENHYDRAMINE HCL 25 MG
25 CAPSULE ORAL
Status: COMPLETED | OUTPATIENT
Start: 2025-06-09 | End: 2025-06-09

## 2025-06-09 RX ORDER — SULFAMETHOXAZOLE AND TRIMETHOPRIM 800; 160 MG/1; MG/1
1 TABLET ORAL 2 TIMES DAILY
Qty: 14 TABLET | Refills: 0 | Status: SHIPPED | OUTPATIENT
Start: 2025-06-09 | End: 2025-06-16

## 2025-06-09 RX ORDER — VANCOMYCIN 2 GRAM/500 ML IN 0.9 % SODIUM CHLORIDE INTRAVENOUS
25 ONCE
Status: COMPLETED | OUTPATIENT
Start: 2025-06-09 | End: 2025-06-09

## 2025-06-09 RX ADMIN — IOHEXOL 75 ML: 350 INJECTION, SOLUTION INTRAVENOUS at 02:06

## 2025-06-09 RX ADMIN — PIPERACILLIN SODIUM AND TAZOBACTAM SODIUM 4.5 G: 4; .5 INJECTION, POWDER, FOR SOLUTION INTRAVENOUS at 02:06

## 2025-06-09 RX ADMIN — SODIUM CHLORIDE 2000 MG: 9 INJECTION, SOLUTION INTRAVENOUS at 03:06

## 2025-06-09 RX ADMIN — DIPHENHYDRAMINE HYDROCHLORIDE 25 MG: 25 CAPSULE ORAL at 04:06

## 2025-06-09 NOTE — ED PROVIDER NOTES
Encounter Date: 2025       History     Chief Complaint   Patient presents with    Post-op Problem     Pt is S/P breast reduction  and now report bleeding around site and foul- smelling dark brown drainage.      Patient is a 45-year-old female with past medical history of iron-deficiency anemia that presents to the emergency department for right breast bleeding and foul-smelling drainage from her LINDY drain. Patient is having some oozing congealed blood from the inferior aspect of her right breast.  She states that she noticed it today.  She says that she soaked through some gauze and napkins at home.  She also states that when she was changing out her LINDY drain that the discharge was darker and malodorous.  She states that she had 2 over LINDY drains removed earlier this week with plastic surgery.  She states that she still has a LINDY drain to her left lower abdomen from her abdominoplasty.  Per my chart review of plastic surgeries note they state that her incisional wounds are healing nicely.  She denies any fevers, chills, body aches.  She denies any increasing pain of either breast or her abdomen.        Review of patient's allergies indicates:   Allergen Reactions    Oxycodone Anaphylaxis     Past Medical History:   Diagnosis Date    Allergy     Dysmenorrhea 2024    Fibroids     ARNOL (iron deficiency anemia) 2014    Menorrhagia with regular cycle 10/24/2024    Painful orthopaedic hardware 2016    Short toes 2016    Toe contracture 2016     Past Surgical History:   Procedure Laterality Date    ABDOMINOPLASTY N/A 2025    Procedure: ABDOMINOPLASTY;  Surgeon: Hermes Lugo MD;  Location: [address];  Service: Plastics;  Laterality: N/A;  CASH     SECTION  ,     COLONOSCOPY N/A 10/23/2024    Procedure: COLONOSCOPY;  Surgeon: Keila Seo MD;  Location: [facility];  Service: Endoscopy;  Laterality: N/A;  Referral:  Svitlana Becker MD / luis alfredo /  inst portal - LW  10/14/24- case adj. 15 min, lvm/portal for pc. DBM  10/17/24- pc complete. DBM  10/18/24- r/s, instr to portal. [facility]  10/22 pt confirmed SW    CYSTOSCOPY N/A 02/20/2019    Procedure: CYSTOSCOPY;  Surgeon: Pollo Park MD;  Location: [facility] OR [address] [place] [address];  Service: Urology;  Laterality: N/A;  30 min/2nd floor OR    FOOT SURGERY Right     HYSTERECTOMY, TOTAL, LAPAROSCOPIC, WITH SALPINGECTOMY N/A 11/14/2024    Procedure: HYSTERECTOMY,TOTAL,LAPAROSCOPIC,WITH SALPINGECTOMY;  Surgeon: Nanette Timmons MD;  Location: [facility] OR;  Service: OB/GYN;  Laterality: N/A;  RN PREOP 11/7/2024   BLOOD REFUSAL SIGNED----UPT ON ARRIVAL    INJECTION OF BOTULINUM TOXIN TYPE A N/A 02/20/2019    Procedure: INJECTION, BOTULINUM TOXIN, TYPE A;  Surgeon: Pollo Park MD;  Location: [facility] OR [address] [place] [address];  Service: Urology;  Laterality: N/A;    LIPOSUCTION OF ABDOMEN N/A 5/27/2025    Procedure: LIPOSUCTION, ABDOMEN;  Surgeon: Hermes Lugo MD;  [facility];  Service: Plastics;  Laterality: N/A;  CASH    REDUCTION OF BOTH BREASTS Bilateral 5/27/2025    Procedure: MAMMOPLASTY, REDUCTION, BILATERAL;  Surgeon: Hermes Lugo MD;  [facility];  Service: Plastics;  Laterality: Bilateral;  LEFT - 669 G  RIGHT - 643 G     Family History   Problem Relation Name Age of Onset    Hypertension Mother Umer Rodriguez     Rectal cancer Father Natan Rodriguez     Cancer Father Natan Rodriguez     No Known Problems Sister      No Known Problems Brother      No Known Problems Brother      Breast cancer Neg Hx      Colon cancer Neg Hx      Ovarian cancer Neg Hx      Hyperlipidemia Neg Hx      Heart disease Neg Hx      Diabetes Neg Hx      Arthritis Neg Hx       Social History[1]  Review of Systems    Physical Exam     Initial Vitals [06/09/25 0119]   BP Pulse Resp Temp SpO2   (!) 145/88 92 18 98.8 °F (37.1 °C) 98 %      MAP       --         Physical Exam    Constitutional: She appears well-developed and  well-nourished. No distress.   HENT:   Head: Normocephalic and atraumatic.   Eyes: EOM are normal. Pupils are equal, round, and reactive to light.   Neck: Neck supple.   Normal range of motion.  Cardiovascular:  Normal rate, regular rhythm and intact distal pulses.           Pulmonary/Chest: Breath sounds normal. No respiratory distress.   Abdominal: Abdomen is soft. She exhibits no distension. There is no abdominal tenderness. There is no rebound.   Musculoskeletal:      Cervical back: Normal range of motion and neck supple.     Neurological: She is alert and oriented to person, place, and time.   Skin: No abscess (No fluctuance felt of right breast) noted.   Exam was performed with a female chaperone  Incisional wounds are not erythematous, edematous, no increased warmth.  Wounds are not dehisced.  Right breast LINDY drain is intact concern from some potential purulence in the tubing.  Incisional wound of her right inferior breast has some dark, congealed venous blood leaking.  Left lower abdomen LINDY drain normal.         ED Course   Procedures  Labs Reviewed   COMPREHENSIVE METABOLIC PANEL - Abnormal       Result Value    Sodium 142      Potassium 4.4      Chloride 111 (*)     CO2 21 (*)     Glucose 100      BUN 13      Creatinine 0.9      Calcium 8.3 (*)     Protein Total 6.5      Albumin 3.1 (*)     Bilirubin Total 0.5      ALP 69      AST 27      ALT 18      Anion Gap 10      eGFR >60     CBC WITH DIFFERENTIAL - Abnormal    WBC 11.93      RBC 3.51 (*)     HGB 10.3 (*)     HCT 32.5 (*)     MCV 93      MCH 29.3      MCHC 31.7 (*)     RDW 14.1      Platelet Count 344      MPV 9.9      Nucleated RBC 0      Neut % 70.0      Lymph % 19.2      Mono % 4.9      Eos % 5.0      Basophil % 0.5      Imm Grans % 0.4      Neut # 8.34 (*)     Lymph # 2.29      Mono # 0.59      Eos # 0.60 (*)     Baso # 0.06      Imm Grans # 0.05 (*)    HEPATITIS C ANTIBODY - Normal    Hep C Ab Interp Non-Reactive     HIV 1 / 2 ANTIBODY - Normal     HIV 1/2 Ag/Ab Non-Reactive     HEP C VIRUS HOLD SPECIMEN    Extra Tube Hold for add-ons.     CBC W/ AUTO DIFFERENTIAL    Narrative:     The following orders were created for panel order CBC auto differential.  Procedure                               Abnormality         Status                     ---------                               -----------         ------                     CBC with Differential[[id number]]       Abnormal            Final result                 Please view results for these tests on the individual orders.          Imaging Results              CT Chest With Contrast (Final result)  Result time 06/09/25 04:41:26      Final result by Abram Del Rio MD (06/09/25 04:41:26)                   Impression:      Postoperative change of bilateral breast reductions with right-sided percutaneous drain in place.  Ill-defined subareolar fluid bilaterally, right greater than left, may reflect postoperative seromas or hematomas.  Underlying abscesses felt less likely.    Electronically signed by resident: Flakito Mittal  Date:    06/09/2025  Time:    03:11    Electronically signed by: Abram Del Rio  Date:    06/09/2025  Time:    04:41               Narrative:    EXAMINATION:  CT CHEST WITH CONTRAST    CLINICAL HISTORY:  Soft tissue mass, chest, US/xray nondiagnostic    TECHNIQUE:  Low dose axial images, sagittal and coronal reformations were obtained from the thoracic inlet to the lung bases following the IV administration of 75 mL of Omnipaque 350.    COMPARISON:  None    FINDINGS:  SOFT TISSUES: Postoperative change of bilateral breast reductions.  Right percutaneous drainage catheter in place along the anterior aspect of the pectoralis major muscle.  Ill-defined fluid in the subareolar region bilaterally, right greater than left.  No axillary or subpectoral lymphadenopathy. The visualized thyroid gland is unremarkable.    HEART AND MEDIASTINUM: No mediastinal or hilar lymphadenopathy. Heart is  normal size.  Trace pericardial fluid.  Left-sided aortic arch.  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  Pulmonary trunk is normal in size.    LUNGS, PLEURA, AND AIRWAYS: Airways are patent. Lungs are unremarkable with no focal abnormality demonstrated.No pleural effusion or pneumothorax.    UPPER ABDOMEN: Unremarkable.    BONES: No fracture or focal osseous lesion.                        Preliminary result by Flakito Mittal MD (06/09/25 03:17:57)                   Impression:      Postoperative change of bilateral breast reductions with right-sided percutaneous drain in place.  Ill-defined subareolar fluid bilaterally, right greater than left, may reflect postoperative seromas or hematomas.  Underlying abscesses felt less likely.    Electronically signed by resident: Flakito Mittal  Date:    06/09/2025  Time:    03:11                 Narrative:    EXAMINATION:  CT CHEST WITH CONTRAST    CLINICAL HISTORY:  Soft tissue mass, chest, US/xray nondiagnostic;breast reduction with LINDY drain purulence;    TECHNIQUE:  Low dose axial images, sagittal and coronal reformations were obtained from the thoracic inlet to the lung bases following the IV administration of 75 mL of Omnipaque 350.    COMPARISON:  None    FINDINGS:  SOFT TISSUES: Postoperative change of bilateral breast reductions.  Right percutaneous drainage catheter in place along the anterior aspect of the pectoralis major muscle.  Ill-defined fluid in the subareolar region bilaterally, right greater than left.  No axillary or subpectoral lymphadenopathy. The visualized thyroid gland is unremarkable.    HEART AND MEDIASTINUM: No mediastinal or hilar lymphadenopathy. Heart is normal size.  Trace pericardial fluid.  Left-sided aortic arch.  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  Pulmonary trunk is normal in size.    LUNGS, PLEURA, AND AIRWAYS: Airways are patent. Lungs are unremarkable  with no focal abnormality demonstrated.No pleural effusion or pneumothorax.    UPPER ABDOMEN: Unremarkable.    BONES: No fracture or focal osseous lesion.                                       Medications   vancomycin 2 g in 0.9% sodium chloride 500 mL IVPB (0 mg Intravenous Stopped 6/9/25 0437)   iohexoL (OMNIPAQUE 350) injection 75 mL (75 mLs Intravenous Given 6/9/25 1047)   diphenhydrAMINE capsule 25 mg (25 mg Oral Given 6/9/25 9489)     Medical Decision Making  Umer Rodriguez is a 45 y.o. female presenting to the ED with right breast hematoma versus seroma and some normal postop bleeding.  History and physical exam as above. Initial vital signs stable and non-actionable. Initial work-up to include:  See above    Differential diagnosis considered for this patient includes, but is not limited to:  Breast hematoma, seroma, abscess.  Other severe, more emergent diagnoses considered, but deemed much less likely, to include:  Sepsis.    We discussed the case with Plastic surgery they will evaluate the patient.  They state that the patient should get a CT of the chest with contrast to evaluate for fluid collections.  They state that she should get a dose of broad-spectrum antibiotics and be started on Bactrim.  After their evaluation of the patient and scans they state that the patient can be discharged to home with follow up in 2 days.Patient will be discharged to home with ED return precautions     Amount and/or Complexity of Data Reviewed  Labs: ordered. Decision-making details documented in ED Course.  Radiology: ordered.    Risk  OTC drugs.  Prescription drug management.               ED Course as of 06/09/25 0809   Mon Jun 09, 2025   0143 Two drains removed on Wednesday in clinic, two remain in place.  [NN]   0144 status post bilateral breast reduction and tummy tuck on 5/27 with Dr. Lugo [NN]   0145 Here today for wound check.  Having some purulent discharge noted from the right breast LINDY drain today  that is malodorous.  She is having some mild discomfort.  She is concerned because there seems to be dark, congealed blood coming from the lower breast incision in the midline that started this evening. No nausea, vomiting, fevers, chills.  [NN]   0200 Patient's still has LINDY drain to the right breast and LINDY drain to the left lower abdomen from her tummy tuck incision in place. [NN]   0200 The incisions appear well healing.  There is some dark blood coming from the right breast underneath the breast in the midline incision.  There is no fluctuance, erythema or warmth.  Minimal tenderness. [NN]   0201 She is afebrile here. [NN]   0202 Patient emptied her drains prior to arrival.  There is some purulent material noted in the tubing of the LINDY drain that has going to her right breast.  No purulent drainage in the LINDY drain for her lower abdominal incision. [NN]   0202 Discussed with Plastic surgery.  Will obtain labs and CT chest to evaluate for abscess.  They will further evaluate. [NN]   0305 We also discussed with plastic surgery that the 1 dose of broad-spectrum antibiotics. [HJ]   0306 CBC auto differential(!)  A 3 point hemoglobin drop in 3 weeks.  No leukocytosis.  Platelet count is normal. [HJ]   0321 Comprehensive metabolic panel(!)  CMP acutely unremarkable [HJ]   0323 Impression:     Postoperative change of bilateral breast reductions with right-sided percutaneous drain in place.  Ill-defined subareolar fluid bilaterally, right greater than left, may reflect postoperative seromas or hematomas.  Underlying abscesses felt less likely.   [NN]   0421 We discussed the case with Plastic surgery and they recommend that the patient be put on a short course of Bactrim.  They scan and exam was reassuring from their side.  She has follow up in 3 days with her plastic surgery team. [HJ]   0425 Evaluated by plastics.  They feel comfortable with discharge home.  She has outpatient clinic follow up already scheduled.  They  recommend DC with Bactrim. [NN]      ED Course User Index  [HJ] Peter Joe MD  [NN] Iveth Mccann MD                           Clinical Impression:  Final diagnoses:  [N64.89] Breast hematoma (Primary)          ED Disposition Condition    Discharge Stable          ED Prescriptions       Medication Sig Dispense Start Date End Date Auth. Provider    sulfamethoxazole-trimethoprim 800-160mg (BACTRIM DS) 800-160 mg Tab Take 1 tablet by mouth 2 (two) times daily. for 7 days 14 tablet 6/9/2025 6/16/2025 Peter Joe MD          Follow-up Information       Follow up With Specialties Details Why Contact Info    [facility] ab[facility] - Emergency Dept Emergency Medicine Go to  As needed, If symptoms worsen [address] [facility] [address] [phone]                   [1]   Social History  Tobacco Use    Smoking status: Never     Passive exposure: Never    Smokeless tobacco: Never   Substance Use Topics    Alcohol use: Not Currently     Comment: occasional    Drug use: No        Peter Joe MD  Resident  06/09/25 0890

## 2025-06-09 NOTE — ED NOTES
Assumed care of the patient. Report received from More NAVARRO. Pt on continuous cardiac monitoring, continuous pulse oximetry, and automatic BP cuff cycling Q15min. Pt in hospital gown, side rails up X2, bed low and locked, and call light is placed within reach. No family/visitors at bedside at this time. Pt denies any complaints or needs.

## 2025-06-09 NOTE — ED NOTES
"Pt expressed she "couldn't stop itching all of a sudden." Pt denies being allergic to abx. RN stopped abx prophylactic and notified MD.  "

## 2025-06-09 NOTE — TELEPHONE ENCOUNTER
Reason for Disposition   [1] Bleeding from incision AND [2] won't stop after 10 minutes of direct pressure    Additional Information   Negative: [1] Major abdominal surgical incision AND [2] wound gaping open AND [3] visible internal organs   Negative: Sounds like a life-threatening emergency to the triager   Negative: Patient has a Negative Pressure Wound Therapy device   Negative: Patient is followed by a wound clinic or wound specialist for this wound   Negative: Giorgio-Cabello, Hemovac, or Penrose drain: symptoms or questions about   Negative:  incision, symptoms or questions about   Negative: Episiotomy or vaginal laceration, symptoms or questions about   Negative: Post-op total hip replacement, symptoms or questions about   Negative: Post-op total knee replacement, symptoms or questions about    Protocols used: Post-Op Incision Symptoms and Twnrdijas-W-WS

## 2025-06-09 NOTE — ED TRIAGE NOTES
Umer Rodriguez, a 45 y.o. female presents to the ED w/ complaint of     Triage note:  Chief Complaint   Patient presents with    Post-op Problem     Pt is S/P breast reduction 5/27 and now report bleeding around site and foul- smelling dark brown drainage.      Review of patient's allergies indicates:   Allergen Reactions    Oxycodone Anaphylaxis     Past Medical History:   Diagnosis Date    Allergy     Dysmenorrhea 11/14/2024    Fibroids     ARNOL (iron deficiency anemia) 01/21/2014    Menorrhagia with regular cycle 10/24/2024    Painful orthopaedic hardware 12/07/2016    Short toes 12/07/2016    Toe contracture 12/07/2016

## 2025-06-09 NOTE — TELEPHONE ENCOUNTER
Patient states that she is bleeding very dark blood suddenly under her right breast. She applied pressure and it is still bleeding. I have advised as per protocol to the ED

## 2025-06-09 NOTE — DISCHARGE INSTRUCTIONS
You were seen in the emergency department for your breast bleeding and drainage.  Evaluation and workup were reassuring that nothing emergent was going on.  Have started you on some antibiotics as well.  Please follow up with your plastic surgery team at your scheduled appointment.  Please return in the emergency department for worsening or changing symptoms.

## 2025-06-09 NOTE — CONSULTS
Plastic and Reconstructive Surgery   Consult Note    Date of Consultation:   2025    Reason for Consultation:  R breast bleed, LINDY drainage    History of Present Illness:  44 yo Female who is s/p bilateral breast reduction and abdominoplasty on , who presented earlier to the ED with complaints of increased bloody drainage from her R breast IMF incision as well as potential foul smelling drainage from her Right LINDY. She had completed a course of post op Keflex. She has been seen in the Plastic Surgery clinic and had 2 LINDY removed thus far. She denies any fevers or chills. She has been compliant with post op instructions. LINDY x2 remaining (one in R breast and L abd) both mostly serous. Her Right breast did have a small amount of likely old hematoma draining at bedside.     Past Medical History:    has a past medical history of Allergy, Dysmenorrhea (2024), Fibroids, ARNOL (iron deficiency anemia) (2014), Menorrhagia with regular cycle (10/24/2024), Painful orthopaedic hardware (2016), Short toes (2016), and Toe contracture (2016).    Past Surgical History:    has a past surgical history that includes  section (, ); Foot surgery (Right); Cystoscopy (N/A, 2019); Injection of botulinum toxin type A (N/A, 2019); Colonoscopy (N/A, 10/23/2024); hysterectomy, total, laparoscopic, with salpingectomy (N/A, 2024); Reduction of both breasts (Bilateral, 2025); Abdominoplasty (N/A, 2025); and Liposuction of abdomen (N/A, 2025).    Social History:  Social History     Tobacco Use    Smoking status: Never     Passive exposure: Never    Smokeless tobacco: Never   Substance Use Topics    Alcohol use: Not Currently     Comment: occasional     Social History     Substance and Sexual Activity   Drug Use No       Family History:  Family History   Problem Relation Name Age of Onset    Hypertension Mother Umer Rodriguez     Rectal cancer Father Natan Rodriguez   "   Cancer Father Natan Rodriguez     No Known Problems Sister      No Known Problems Brother      No Known Problems Brother      Breast cancer Neg Hx      Colon cancer Neg Hx      Ovarian cancer Neg Hx      Hyperlipidemia Neg Hx      Heart disease Neg Hx      Diabetes Neg Hx      Arthritis Neg Hx         Allergies:  Review of patient's allergies indicates:   Allergen Reactions    Oxycodone Anaphylaxis       Home Medications:  Prior to Admission medications    Medication Sig Start Date End Date Taking? Authorizing Provider   ergocalciferol (ERGOCALCIFEROL) 50,000 unit Cap Take 1 capsule (50,000 Units total) by mouth every 7 days.  Patient not taking: Reported on 2025  Nidia Traore MD   sulfamethoxazole-trimethoprim 800-160mg (BACTRIM DS) 800-160 mg Tab Take 1 tablet by mouth 2 (two) times daily. for 7 days 25  Peter Joe MD       Review of Systems:  Negative except for what is noted in HPI    Physical Exam:  VITAL SIGNS:   Vitals:    25 0119 25 0500   BP: (!) 145/88 132/84   Pulse: 92 85   Resp: 18 16   Temp: 98.8 °F (37.1 °C) 98.6 °F (37 °C)   TempSrc: Oral Oral   SpO2: 98% 100%   Weight: 85.3 kg (188 lb 0.8 oz)    Height: 5' 2" (1.575 m)      TMAX: Temp (24hrs), Av.7 °F (37.1 °C), Min:98.6 °F (37 °C), Max:98.8 °F (37.1 °C)    General: Alert; No acute distress  Cardiovascular: Regular rate   Respiratory: Normal respiratory effort. Chest rise symmetric.   Abdomen: Soft, flat, no hematoma or sign of infection, she has her L abd Sav drain in place which is serosang, Prineo tape in place, incision intact, umbilicus also healing  Extremity: Moves all extremities equally.  Neurologic: No focal deficit. Speech normal     Bilateral breast soft, symmetric, NACs healing and viable, small old hematoma evacuated under R IMF incision, no active bleed, SAV R breast serous, may have small amount of fatty necrosis, no sign of infection    Diagnostic Data:  Recent Results (from the past " "2 weeks)   CBC with Differential    Collection Time: 06/09/25  2:34 AM   Result Value Ref Range    WBC 11.93 3.90 - 12.70 K/uL    HGB 10.3 (L) 12.0 - 16.0 gm/dL    HCT 32.5 (L) 37.0 - 48.5 %    Platelet Count 344 150 - 450 K/uL     No results found for this or any previous visit (from the past 2 weeks).  Lab Results   Component Value Date    ALBUMIN 3.1 (L) 06/09/2025     No results found for: "CRP"  Lab Results   Component Value Date    INR 1.0 10/28/2011     No results found for: "PTT"    Microbiology Results (last 7 days)       ** No results found for the last 168 hours. **            Assessment:  45 y.o.female s/p breast reduction and abdominoplasty 5/27    Plan:  No sign of infection at present time  Encouraged to keep stripping remaining drains and record daily outputs, bring to clinic  Right breast had small old hematoma expressed at bedside under IMF incision, no further bleeding noted  LINDY drains do not show any sign of purulence. Notified to continue to monitor  Continue post op precautions  She has a follow up scheduled with Dr. Lugo this week.   She knows to call or return in any changes or symptoms worsen.  Bactrim abx recommended for ppx, she completed short course of Keflex already        Reid Lozoya MD  Plastic Surgery Fellow   06/09/2025      Discussed with Dr. Gauthier (and Dr. Lugo)  "

## 2025-06-10 ENCOUNTER — PATIENT MESSAGE (OUTPATIENT)
Dept: PLASTIC SURGERY | Facility: CLINIC | Age: 45
End: 2025-06-10
Payer: COMMERCIAL

## 2025-06-11 ENCOUNTER — OFFICE VISIT (OUTPATIENT)
Dept: PLASTIC SURGERY | Facility: CLINIC | Age: 45
End: 2025-06-11
Payer: COMMERCIAL

## 2025-06-11 DIAGNOSIS — Z09 SURGERY FOLLOW-UP EXAMINATION: Primary | ICD-10-CM

## 2025-06-11 PROCEDURE — 99999 PR PBB SHADOW E&M-EST. PATIENT-LVL II: CPT | Mod: PBBFAC,,, | Performed by: SURGERY

## 2025-06-11 PROCEDURE — 3044F HG A1C LEVEL LT 7.0%: CPT | Mod: CPTII,S$GLB,, | Performed by: SURGERY

## 2025-06-11 PROCEDURE — 1159F MED LIST DOCD IN RCRD: CPT | Mod: CPTII,S$GLB,, | Performed by: SURGERY

## 2025-06-11 PROCEDURE — 99024 POSTOP FOLLOW-UP VISIT: CPT | Mod: S$GLB,,, | Performed by: SURGERY

## 2025-06-11 NOTE — PROGRESS NOTES
Umer Rodriguez [facility] for a follow up visit status post bilateral breast reduction + full abdominoplasty w/ liposuction on 5/27/2025 by Dr. Hermes Lugo. Of note, pt recently went to the ER over the weekend for R breast hematoma. She reports R breast drain output < 20 ccs in a 24 hour period. She has 1 remaining drain in her abdomen w/ output of 30-40 ccs in a 24 hour period. She denies fever, chills, nausea, vomiting or other systemic signs of infection.     ROS - negative, other than stated above in HPI    PHYSICAL EXAMINATION  There were no vitals filed for this visit.  WD WN NAD  VSS  Normal respiratory effort  R breast - incision with tape in place, some drainage medial to the T (dark blood), nipple viable + sensation, drain with SS output  L breast - incision with tape in place, no erythema or drainage, nipple viable + sensation  Abdomen - incision with tape in place, no erythema or drainage, no signs of infection, no fluid collection palpated, drain with SS output    ASSESSMENT/PLAN  45 y.o. F s/p bilateral breast reduction   - Pt was seen and evaluated by myself and Dr. Hermes Lugo.  - Right breast drain removed today, abdominal drain to remain.   - A small 1 inch incision was made medial to the R breast T. Roughly 20 ccs of hematoma was drained from this area. Dermabond and steri-strips were placed. Pt tolerated well.   - RTC x 1 week(s). Staff to schedule.    All questions were answered. The patient was advised to call the clinic with any questions or concerns prior to their next visit.     Vidya Worthington   Plastic and Reconstructive Surgery   [phone]

## 2025-06-18 ENCOUNTER — PATIENT MESSAGE (OUTPATIENT)
Dept: PLASTIC SURGERY | Facility: CLINIC | Age: 45
End: 2025-06-18

## 2025-06-18 ENCOUNTER — OFFICE VISIT (OUTPATIENT)
Dept: PLASTIC SURGERY | Facility: CLINIC | Age: 45
End: 2025-06-18
Payer: COMMERCIAL

## 2025-06-18 VITALS
SYSTOLIC BLOOD PRESSURE: 132 MMHG | DIASTOLIC BLOOD PRESSURE: 84 MMHG | WEIGHT: 188.06 LBS | BODY MASS INDEX: 34.61 KG/M2 | HEIGHT: 62 IN | HEART RATE: 85 BPM

## 2025-06-18 DIAGNOSIS — Z09 SURGERY FOLLOW-UP EXAMINATION: Primary | ICD-10-CM

## 2025-06-18 PROBLEM — Z98.890 S/P BILATERAL BREAST REDUCTION: Status: ACTIVE | Noted: 2025-06-18

## 2025-06-18 PROCEDURE — 3079F DIAST BP 80-89 MM HG: CPT | Mod: CPTII,S$GLB,, | Performed by: SURGERY

## 2025-06-18 PROCEDURE — 3044F HG A1C LEVEL LT 7.0%: CPT | Mod: CPTII,S$GLB,, | Performed by: SURGERY

## 2025-06-18 PROCEDURE — 99024 POSTOP FOLLOW-UP VISIT: CPT | Mod: S$GLB,,, | Performed by: SURGERY

## 2025-06-18 PROCEDURE — 99999 PR PBB SHADOW E&M-EST. PATIENT-LVL III: CPT | Mod: PBBFAC,,, | Performed by: SURGERY

## 2025-06-18 PROCEDURE — 1159F MED LIST DOCD IN RCRD: CPT | Mod: CPTII,S$GLB,, | Performed by: SURGERY

## 2025-06-18 PROCEDURE — 3075F SYST BP GE 130 - 139MM HG: CPT | Mod: CPTII,S$GLB,, | Performed by: SURGERY

## 2025-06-18 NOTE — PROGRESS NOTES
Patient patient presents [facility] after having a breast reduction in abdominoplasty.  Patient has done very very well.  She has a small hematoma which was evacuated everything is healing nicely.  Patient is happy.  We will see the patient back in proximally 6 weeks.

## 2025-06-21 ENCOUNTER — PATIENT MESSAGE (OUTPATIENT)
Dept: PLASTIC SURGERY | Facility: CLINIC | Age: 45
End: 2025-06-21
Payer: COMMERCIAL

## 2025-06-25 ENCOUNTER — OFFICE VISIT (OUTPATIENT)
Dept: PLASTIC SURGERY | Facility: CLINIC | Age: 45
End: 2025-06-25
Payer: COMMERCIAL

## 2025-06-25 VITALS
WEIGHT: 188.06 LBS | HEIGHT: 62 IN | SYSTOLIC BLOOD PRESSURE: 130 MMHG | HEART RATE: 82 BPM | BODY MASS INDEX: 34.61 KG/M2 | DIASTOLIC BLOOD PRESSURE: 80 MMHG

## 2025-06-25 DIAGNOSIS — Z09 SURGERY FOLLOW-UP EXAMINATION: Primary | ICD-10-CM

## 2025-06-25 PROCEDURE — 1159F MED LIST DOCD IN RCRD: CPT | Mod: CPTII,S$GLB,, | Performed by: SURGERY

## 2025-06-25 PROCEDURE — 3079F DIAST BP 80-89 MM HG: CPT | Mod: CPTII,S$GLB,, | Performed by: SURGERY

## 2025-06-25 PROCEDURE — 99999 PR PBB SHADOW E&M-EST. PATIENT-LVL III: CPT | Mod: PBBFAC,,, | Performed by: SURGERY

## 2025-06-25 PROCEDURE — 99024 POSTOP FOLLOW-UP VISIT: CPT | Mod: S$GLB,,, | Performed by: SURGERY

## 2025-06-25 PROCEDURE — 3075F SYST BP GE 130 - 139MM HG: CPT | Mod: CPTII,S$GLB,, | Performed by: SURGERY

## 2025-06-25 PROCEDURE — 3044F HG A1C LEVEL LT 7.0%: CPT | Mod: CPTII,S$GLB,, | Performed by: SURGERY
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
## 2025-07-06 ENCOUNTER — PATIENT MESSAGE (OUTPATIENT)
Dept: PLASTIC SURGERY | Facility: CLINIC | Age: 45
End: 2025-07-06
Payer: COMMERCIAL

## 2025-08-04 PROBLEM — Z98.890 S/P BILATERAL BREAST REDUCTION: Status: RESOLVED | Noted: 2025-06-18 | Resolved: 2025-08-04

## 2025-08-06 ENCOUNTER — OFFICE VISIT (OUTPATIENT)
Dept: PLASTIC SURGERY | Facility: CLINIC | Age: 45
End: 2025-08-06
Payer: COMMERCIAL

## 2025-08-06 DIAGNOSIS — Z09 SURGERY FOLLOW-UP EXAMINATION: Primary | ICD-10-CM

## 2025-08-06 PROCEDURE — 1159F MED LIST DOCD IN RCRD: CPT | Mod: CPTII,S$GLB,, | Performed by: SURGERY

## 2025-08-06 PROCEDURE — 99024 POSTOP FOLLOW-UP VISIT: CPT | Mod: S$GLB,,, | Performed by: SURGERY

## 2025-08-06 PROCEDURE — 99999 PR PBB SHADOW E&M-EST. PATIENT-LVL II: CPT | Mod: PBBFAC,,, | Performed by: SURGERY

## 2025-08-06 PROCEDURE — 3044F HG A1C LEVEL LT 7.0%: CPT | Mod: CPTII,S$GLB,, | Performed by: SURGERY

## (undated) DEVICE — TUBING INFILTRATION STRL DISP

## (undated) DEVICE — GAUZE SPONGE 4X4 12PLY

## (undated) DEVICE — TRAY MINOR GEN SURG

## (undated) DEVICE — SEE MEDLINE ITEM 154981

## (undated) DEVICE — SOL NACL 0.9% IV INJ 1000ML

## (undated) DEVICE — TROCAR KII FIOS 11MM X 100MM

## (undated) DEVICE — PADDING CAST 4IN

## (undated) DEVICE — BLADE SURG CARBON STEEL SZ11

## (undated) DEVICE — PENCIL SMK EVAC CONNECTOR 10FT

## (undated) DEVICE — SEE MEDLINE ITEM 146313

## (undated) DEVICE — APPLICATOR CHLORAPREP ORN 26ML

## (undated) DEVICE — SOL CLEARIFY VISUALIZATION LAP

## (undated) DEVICE — BANDAGE BULKEE II 2.25INX3YD

## (undated) DEVICE — STAPLER SKIN SUBCUTICULAR

## (undated) DEVICE — COVER OVERHEAD SURG LT BLUE

## (undated) DEVICE — UNDERGLOVES BIOGEL PI SZ 6 LF

## (undated) DEVICE — SEE MEDLINE ITEM 152622

## (undated) DEVICE — SUT VICRYL PLUS 0 CT1 36IN

## (undated) DEVICE — SUT MCRYL PLUS 4-0 PS2 27IN

## (undated) DEVICE — ELECTRODE REM PLYHSV RETURN 9

## (undated) DEVICE — Device

## (undated) DEVICE — ELECTRODE MEGADYNE RETURN DUAL

## (undated) DEVICE — TRAY CATH 1-LYR URIMTR 16FR

## (undated) DEVICE — COVER PROXIMA MAYO STAND

## (undated) DEVICE — DRAPE UINDERBUT GRAD PCH

## (undated) DEVICE — SUPPORT ULNA NERVE PROTECTOR

## (undated) DEVICE — SEE MEDLINE ITEM 146308

## (undated) DEVICE — DRAIN CHANNEL ROUND 15FR

## (undated) DEVICE — EVACUATOR WOUND BULB 100CC

## (undated) DEVICE — SYR ONLY LUER LOCK 20CC

## (undated) DEVICE — SUT MONOCRYL 4.0 PS2 CP496G

## (undated) DEVICE — GLOVE SIGNATURE ESSNTL LTX 6

## (undated) DEVICE — GOWN NONREINF SET-IN SLV XL

## (undated) DEVICE — BLADE SURG #15 CARBON STEEL

## (undated) DEVICE — ADAPTER DISP HAND SWITCH

## (undated) DEVICE — NDL HYPO REG 25G X 1 1/2

## (undated) DEVICE — NDL SPINAL SPINOCAN 22GX3.5

## (undated) DEVICE — SYR 50ML CATH TIP

## (undated) DEVICE — GOWN AERO CHROME W/ TOWEL XL

## (undated) DEVICE — GLOVE SURG BIOGEL LATEX SZ 7.5

## (undated) DEVICE — GAUZE SPONGE 4'X4 12 PLY

## (undated) DEVICE — MANIFOLD 4 PORT

## (undated) DEVICE — SPONGE COTTON TRAY 4X4IN

## (undated) DEVICE — PAD ABDOMINAL STERILE 8X10IN

## (undated) DEVICE — RETRACTOR RADIALUX LIGHTED

## (undated) DEVICE — STAPLER SKIN ROTATING HEAD

## (undated) DEVICE — SEE MEDLINE ITEM 156953

## (undated) DEVICE — DRAPE HALF SURGICAL 40X58IN

## (undated) DEVICE — SPONGE DERMACEA GAUZE 4X4

## (undated) DEVICE — TUBING 8FT HI VACLIPOSUCTION

## (undated) DEVICE — BLADE SURG CARBON STEEL #10

## (undated) DEVICE — ADHESIVE DERMABOND MINI HV

## (undated) DEVICE — PAD SANITARY OB STERILE

## (undated) DEVICE — SUT 0 VICRYL / CT-1

## (undated) DEVICE — SEE MEDLINE ITEM 157181

## (undated) DEVICE — CANISTER SUCTION RIGID 2000CC

## (undated) DEVICE — KIT ANTIFOG

## (undated) DEVICE — PACK PERI/GYN OPTIMA

## (undated) DEVICE — BOWL STERILE LARGE 32OZ

## (undated) DEVICE — UNDERGLOVES BIOGEL PI SIZE 8

## (undated) DEVICE — BANDAGE ROLL COTTN 4.5INX4.1YD

## (undated) DEVICE — DRAPE STERI LONG

## (undated) DEVICE — SYR 30CC LUER LOCK

## (undated) DEVICE — ELECTRODE EXTENDED BLADE

## (undated) DEVICE — IRRIGATOR ENDOSCOPY DISP.

## (undated) DEVICE — STOCKINET TUBULAR 1 PLY 6X60IN

## (undated) DEVICE — SUT PROLENE 0 CT1 30IN BLUE

## (undated) DEVICE — SPONGE LAP 18X18 PREWASHED

## (undated) DEVICE — GOWN IMPERVIOUS BLUE XXL

## (undated) DEVICE — SEE MEDLINE ITEM 152523

## (undated) DEVICE — DISSECTOR CURVED 5DCD

## (undated) DEVICE — LINER NS ORTHO W/PATIENT PORT

## (undated) DEVICE — SYR 10CC LUER LOCK

## (undated) DEVICE — BANDAGE DERMACEA STRETCH 4X1IN

## (undated) DEVICE — PACK LAPAROSCOPY/PELVISCOPY II

## (undated) DEVICE — TIP RUMI GREEN DISPOSABLE6.7MM

## (undated) DEVICE — SHOE POST-OP VEL CLOS FM W/LG

## (undated) DEVICE — SYR 50CC LL

## (undated) DEVICE — DRAPE STERI INSTRUMENT 1018

## (undated) DEVICE — DRESSING XEROFORM NONADH 1X8IN

## (undated) DEVICE — OCCLUDER COLPO-PNEUMO STERILE

## (undated) DEVICE — SUT COATED VICRYL 4/0 27IN

## (undated) DEVICE — SUT 2-0 VICRYL / CT-1

## (undated) DEVICE — JELLY LUBRICANT STERILE 4 OZ

## (undated) DEVICE — TOWEL OR DISP STRL BLUE 4/PK

## (undated) DEVICE — CATH POLLACK OPEN-END FLEXI-TI

## (undated) DEVICE — GOWN SURGICAL X-LARGE

## (undated) DEVICE — SUT VICRYL 2-0 36 CT-1

## (undated) DEVICE — SUT SILK 2-0 PS 18IN BLACK

## (undated) DEVICE — TRAY MINOR GEN SURG OMC

## (undated) DEVICE — SOL NACL IRR 1000ML BTL

## (undated) DEVICE — GLOVE SENSICARE PI GRN 6

## (undated) DEVICE — HOOK DISSECTING 5MM

## (undated) DEVICE — MARKER FN REG DUAL UTIL RULER

## (undated) DEVICE — DRAPE TOP 53X102IN

## (undated) DEVICE — SET IRR URLGY 2LINE UNIV SPIKE

## (undated) DEVICE — SET CYSTO IRR DRP CHMBR 84IN

## (undated) DEVICE — REMOVER STAPLE SKIN STERILE

## (undated) DEVICE — APPLIER CLIP LIAGCLIP 9.375IN

## (undated) DEVICE — TROCAR KII FIOS 5MM X 100MM

## (undated) DEVICE — TIP YANKAUERS BULB NO VENT

## (undated) DEVICE — BANDAGE ACE ELASTIC 6"

## (undated) DEVICE — NDL WILLIAMS CYSTOSCOPIC

## (undated) DEVICE — SUT 0 ETHILON EN-2 48IN

## (undated) DEVICE — DRESSING XEROFORM FOIL PK 1X8

## (undated) DEVICE — TOURNIQUET SB QC DP 18X4IN

## (undated) DEVICE — NDL INSUF ULTRA VERESS 120MM

## (undated) DEVICE — SEE L#120831

## (undated) DEVICE — SOL IRR WATER STRL 3000 ML

## (undated) DEVICE — PAD PINK TRENDELENBURG POS XL

## (undated) DEVICE — SUT PROLENE 3-0 FS-2 18

## (undated) DEVICE — PAD PREP 50/CA

## (undated) DEVICE — SUT MONOCRYL 3-0 PS-2 UND

## (undated) DEVICE — SET CYSTO IRRIGATION UNIV SPIK

## (undated) DEVICE — PAD PREP CUFFED NS 24X48IN